# Patient Record
Sex: FEMALE | Race: WHITE | NOT HISPANIC OR LATINO | Employment: OTHER | URBAN - NONMETROPOLITAN AREA
[De-identification: names, ages, dates, MRNs, and addresses within clinical notes are randomized per-mention and may not be internally consistent; named-entity substitution may affect disease eponyms.]

---

## 2017-01-01 ENCOUNTER — HOSPITAL ENCOUNTER (OUTPATIENT)
Facility: HOSPITAL | Age: 77
Setting detail: HOSPITAL OUTPATIENT SURGERY
Discharge: HOME | End: 2017-08-24
Attending: INTERNAL MEDICINE | Admitting: INTERNAL MEDICINE

## 2017-01-01 ENCOUNTER — HOSPITAL ENCOUNTER (OUTPATIENT)
Dept: CT IMAGING | Facility: HOSPITAL | Age: 77
Discharge: HOME | End: 2017-09-12
Attending: FAMILY MEDICINE

## 2017-01-01 ENCOUNTER — TELEPHONE (OUTPATIENT)
Dept: FAMILY MEDICINE CLINIC | Facility: CLINIC | Age: 77
End: 2017-01-01

## 2017-01-01 ENCOUNTER — HOSPITAL ENCOUNTER (OUTPATIENT)
Facility: HOSPITAL | Age: 77
LOS: 1 days | Discharge: HOME | End: 2017-09-23
Attending: SPECIALIST | Admitting: SPECIALIST

## 2017-01-01 ENCOUNTER — OFFICE VISIT (OUTPATIENT)
Dept: NEUROLOGY | Facility: CLINIC | Age: 77
End: 2017-01-01

## 2017-01-01 ENCOUNTER — OFFICE VISIT (OUTPATIENT)
Dept: FAMILY MEDICINE CLINIC | Facility: CLINIC | Age: 77
End: 2017-01-01

## 2017-01-01 ENCOUNTER — PATIENT MESSAGE (OUTPATIENT)
Dept: FAMILY MEDICINE CLINIC | Facility: CLINIC | Age: 77
End: 2017-01-01

## 2017-01-01 ENCOUNTER — HOSPITAL ENCOUNTER (OUTPATIENT)
Dept: ULTRASOUND IMAGING | Facility: HOSPITAL | Age: 77
Discharge: HOME | End: 2017-07-12
Attending: FAMILY MEDICINE | Admitting: FAMILY MEDICINE

## 2017-01-01 ENCOUNTER — RESULTS ENCOUNTER (OUTPATIENT)
Dept: FAMILY MEDICINE CLINIC | Facility: CLINIC | Age: 77
End: 2017-01-01

## 2017-01-01 ENCOUNTER — OFFICE VISIT (OUTPATIENT)
Dept: GASTROENTEROLOGY | Facility: CLINIC | Age: 77
End: 2017-01-01

## 2017-01-01 ENCOUNTER — ANESTHESIA (OUTPATIENT)
Dept: PERIOP | Facility: HOSPITAL | Age: 77
End: 2017-01-01

## 2017-01-01 ENCOUNTER — HOSPITAL ENCOUNTER (OUTPATIENT)
Dept: ULTRASOUND IMAGING | Facility: HOSPITAL | Age: 77
Discharge: HOME | End: 2017-09-12
Attending: FAMILY MEDICINE | Admitting: FAMILY MEDICINE

## 2017-01-01 ENCOUNTER — APPOINTMENT (OUTPATIENT)
Dept: PREADMISSION TESTING | Facility: HOSPITAL | Age: 77
End: 2017-01-01

## 2017-01-01 ENCOUNTER — ANESTHESIA (OUTPATIENT)
Dept: GASTROENTEROLOGY | Facility: HOSPITAL | Age: 77
End: 2017-01-01

## 2017-01-01 ENCOUNTER — APPOINTMENT (OUTPATIENT)
Dept: NUCLEAR MEDICINE | Facility: HOSPITAL | Age: 77
End: 2017-01-01
Attending: FAMILY MEDICINE

## 2017-01-01 ENCOUNTER — HOSPITAL ENCOUNTER (OUTPATIENT)
Dept: NUCLEAR MEDICINE | Facility: HOSPITAL | Age: 77
Discharge: HOME | End: 2017-07-12
Attending: FAMILY MEDICINE

## 2017-01-01 ENCOUNTER — APPOINTMENT (OUTPATIENT)
Dept: ULTRASOUND IMAGING | Facility: HOSPITAL | Age: 77
End: 2017-01-01
Attending: FAMILY MEDICINE

## 2017-01-01 ENCOUNTER — ANESTHESIA EVENT (OUTPATIENT)
Dept: PERIOP | Facility: HOSPITAL | Age: 77
End: 2017-01-01

## 2017-01-01 ENCOUNTER — ANESTHESIA EVENT (OUTPATIENT)
Dept: GASTROENTEROLOGY | Facility: HOSPITAL | Age: 77
End: 2017-01-01

## 2017-01-01 VITALS
DIASTOLIC BLOOD PRESSURE: 59 MMHG | HEART RATE: 56 BPM | RESPIRATION RATE: 16 BRPM | SYSTOLIC BLOOD PRESSURE: 147 MMHG | HEIGHT: 61 IN | OXYGEN SATURATION: 98 % | BODY MASS INDEX: 29.64 KG/M2 | WEIGHT: 157 LBS

## 2017-01-01 VITALS
BODY MASS INDEX: 30.82 KG/M2 | HEIGHT: 60 IN | OXYGEN SATURATION: 99 % | DIASTOLIC BLOOD PRESSURE: 70 MMHG | HEART RATE: 54 BPM | SYSTOLIC BLOOD PRESSURE: 130 MMHG | WEIGHT: 157 LBS

## 2017-01-01 VITALS
SYSTOLIC BLOOD PRESSURE: 124 MMHG | RESPIRATION RATE: 18 BRPM | BODY MASS INDEX: 31.8 KG/M2 | DIASTOLIC BLOOD PRESSURE: 72 MMHG | HEART RATE: 60 BPM | OXYGEN SATURATION: 97 % | HEIGHT: 60 IN | TEMPERATURE: 98.3 F | WEIGHT: 162 LBS

## 2017-01-01 VITALS
WEIGHT: 156 LBS | HEART RATE: 56 BPM | OXYGEN SATURATION: 98 % | TEMPERATURE: 98.3 F | DIASTOLIC BLOOD PRESSURE: 68 MMHG | HEIGHT: 60 IN | RESPIRATION RATE: 18 BRPM | SYSTOLIC BLOOD PRESSURE: 142 MMHG | BODY MASS INDEX: 30.63 KG/M2

## 2017-01-01 VITALS
RESPIRATION RATE: 13 BRPM | WEIGHT: 157 LBS | HEIGHT: 60 IN | OXYGEN SATURATION: 97 % | DIASTOLIC BLOOD PRESSURE: 53 MMHG | HEART RATE: 48 BPM | BODY MASS INDEX: 30.82 KG/M2 | TEMPERATURE: 98 F | SYSTOLIC BLOOD PRESSURE: 159 MMHG

## 2017-01-01 VITALS
OXYGEN SATURATION: 95 % | WEIGHT: 157 LBS | DIASTOLIC BLOOD PRESSURE: 61 MMHG | RESPIRATION RATE: 18 BRPM | BODY MASS INDEX: 29.64 KG/M2 | HEIGHT: 61 IN | HEART RATE: 62 BPM | SYSTOLIC BLOOD PRESSURE: 173 MMHG | TEMPERATURE: 98.6 F

## 2017-01-01 VITALS
HEIGHT: 60 IN | HEART RATE: 57 BPM | SYSTOLIC BLOOD PRESSURE: 128 MMHG | BODY MASS INDEX: 30.82 KG/M2 | DIASTOLIC BLOOD PRESSURE: 84 MMHG | OXYGEN SATURATION: 98 % | WEIGHT: 157 LBS

## 2017-01-01 DIAGNOSIS — E66.9 OBESITY, UNSPECIFIED OBESITY SEVERITY, UNSPECIFIED OBESITY TYPE: ICD-10-CM

## 2017-01-01 DIAGNOSIS — R10.13 EPIGASTRIC PAIN: Primary | ICD-10-CM

## 2017-01-01 DIAGNOSIS — I69.90 LATE EFFECTS OF CVA (CEREBROVASCULAR ACCIDENT): Primary | Chronic | ICD-10-CM

## 2017-01-01 DIAGNOSIS — I10 ESSENTIAL HYPERTENSION: Chronic | ICD-10-CM

## 2017-01-01 DIAGNOSIS — R30.0 DYSURIA: Primary | ICD-10-CM

## 2017-01-01 DIAGNOSIS — G40.919 INTRACTABLE EPILEPSY WITHOUT STATUS EPILEPTICUS, UNSPECIFIED EPILEPSY TYPE (HCC): ICD-10-CM

## 2017-01-01 DIAGNOSIS — R10.9 ABDOMINAL PAIN, UNSPECIFIED LOCATION: ICD-10-CM

## 2017-01-01 DIAGNOSIS — I49.8 OTHER CARDIAC ARRHYTHMIA: Chronic | ICD-10-CM

## 2017-01-01 DIAGNOSIS — I69.30 CHRONIC ISCHEMIC RIGHT MCA STROKE: ICD-10-CM

## 2017-01-01 DIAGNOSIS — R10.13 EPIGASTRIC PAIN: ICD-10-CM

## 2017-01-01 DIAGNOSIS — R30.0 BURNING WITH URINATION: Primary | ICD-10-CM

## 2017-01-01 DIAGNOSIS — K21.9 GASTROESOPHAGEAL REFLUX DISEASE WITHOUT ESOPHAGITIS: ICD-10-CM

## 2017-01-01 DIAGNOSIS — I48.0 PAROXYSMAL ATRIAL FIBRILLATION (HCC): ICD-10-CM

## 2017-01-01 DIAGNOSIS — I50.9 CONGESTIVE HEART FAILURE, UNSPECIFIED CONGESTIVE HEART FAILURE CHRONICITY, UNSPECIFIED CONGESTIVE HEART FAILURE TYPE: ICD-10-CM

## 2017-01-01 DIAGNOSIS — R10.9 ABDOMINAL PAIN, UNSPECIFIED LOCATION: Primary | ICD-10-CM

## 2017-01-01 DIAGNOSIS — R30.0 BURNING WITH URINATION: ICD-10-CM

## 2017-01-01 DIAGNOSIS — R26.9 GAIT ABNORMALITY: Chronic | ICD-10-CM

## 2017-01-01 DIAGNOSIS — G25.0 TREMOR, ESSENTIAL: Chronic | ICD-10-CM

## 2017-01-01 DIAGNOSIS — N39.0 URINARY TRACT INFECTION WITHOUT HEMATURIA, SITE UNSPECIFIED: Primary | ICD-10-CM

## 2017-01-01 DIAGNOSIS — Z79.01 CHRONIC ANTICOAGULATION: Primary | Chronic | ICD-10-CM

## 2017-01-01 DIAGNOSIS — I69.90 LATE EFFECTS OF CVA (CEREBROVASCULAR ACCIDENT): Chronic | ICD-10-CM

## 2017-01-01 DIAGNOSIS — R93.89 ABNORMAL X-RAY: ICD-10-CM

## 2017-01-01 DIAGNOSIS — K80.50 BILIARY COLIC: ICD-10-CM

## 2017-01-01 DIAGNOSIS — Z74.09 IMPAIRED MOBILITY: Primary | ICD-10-CM

## 2017-01-01 DIAGNOSIS — Z79.01 CHRONIC ANTICOAGULATION: Chronic | ICD-10-CM

## 2017-01-01 DIAGNOSIS — R26.9 GAIT ABNORMALITY: Primary | Chronic | ICD-10-CM

## 2017-01-01 DIAGNOSIS — R30.0 DYSURIA: ICD-10-CM

## 2017-01-01 DIAGNOSIS — E55.9 UNSPECIFIED VITAMIN D DEFICIENCY: ICD-10-CM

## 2017-01-01 DIAGNOSIS — I10 HTN (HYPERTENSION), BENIGN: ICD-10-CM

## 2017-01-01 DIAGNOSIS — G40.919 INTRACTABLE EPILEPSY WITHOUT STATUS EPILEPTICUS, UNSPECIFIED EPILEPSY TYPE (HCC): Primary | ICD-10-CM

## 2017-01-01 DIAGNOSIS — R73.01 ELEVATED FASTING GLUCOSE: ICD-10-CM

## 2017-01-01 DIAGNOSIS — E78.5 HYPERLIPIDEMIA, UNSPECIFIED HYPERLIPIDEMIA TYPE: Chronic | ICD-10-CM

## 2017-01-01 LAB
25(OH)D3+25(OH)D2 SERPL-MCNC: 37.8 NG/ML (ref 30–100)
ALBUMIN SERPL-MCNC: 3 G/DL (ref 3.5–5)
ALBUMIN SERPL-MCNC: 4 G/DL (ref 3.5–5)
ALBUMIN/GLOB SERPL: 1.2 G/DL (ref 1.1–2.5)
ALBUMIN/GLOB SERPL: 1.5 G/DL (ref 1.1–2.5)
ALP SERPL-CCNC: 45 U/L (ref 24–120)
ALP SERPL-CCNC: 57 U/L (ref 24–120)
ALT SERPL W P-5'-P-CCNC: 39 U/L (ref 0–54)
ALT SERPL-CCNC: 33 U/L (ref 0–54)
ANION GAP SERPL CALCULATED.3IONS-SCNC: 8 MMOL/L (ref 4–13)
ANION GAP SERPL CALCULATED.3IONS-SCNC: 9 MMOL/L (ref 4–13)
APPEARANCE UR: CLEAR
APPEARANCE UR: CLEAR
AST SERPL-CCNC: 28 U/L (ref 7–45)
AST SERPL-CCNC: 34 U/L (ref 7–45)
BACTERIA #/AREA URNS HPF: ABNORMAL /HPF
BACTERIA #/AREA URNS HPF: NORMAL /HPF
BACTERIA SPEC AEROBE CULT: ABNORMAL
BACTERIA UR CULT: ABNORMAL
BACTERIA UR QL AUTO: ABNORMAL /HPF
BASOPHILS # BLD AUTO: 0.03 10*3/MM3 (ref 0–0.2)
BASOPHILS NFR BLD AUTO: 0.4 % (ref 0–2)
BILIRUB SERPL-MCNC: 0.3 MG/DL (ref 0.1–1)
BILIRUB SERPL-MCNC: 0.4 MG/DL (ref 0.1–1)
BILIRUB UR QL STRIP: NEGATIVE
BUN BLD-MCNC: 18 MG/DL (ref 5–21)
BUN BLD-MCNC: 21 MG/DL (ref 5–21)
BUN SERPL-MCNC: 21 MG/DL (ref 5–21)
BUN/CREAT SERPL: 23.7 (ref 7–25)
BUN/CREAT SERPL: 25.6 (ref 7–25)
BUN/CREAT SERPL: 29.6 (ref 7–25)
CALCIUM SERPL-MCNC: 9.5 MG/DL (ref 8.4–10.4)
CALCIUM SPEC-SCNC: 8.5 MG/DL (ref 8.4–10.4)
CALCIUM SPEC-SCNC: 8.7 MG/DL (ref 8.4–10.4)
CHLORIDE SERPL-SCNC: 104 MMOL/L (ref 98–110)
CHLORIDE SERPL-SCNC: 105 MMOL/L (ref 98–110)
CHLORIDE SERPL-SCNC: 105 MMOL/L (ref 98–110)
CHOLEST SERPL-MCNC: 147 MG/DL (ref 130–200)
CLARITY UR: CLEAR
CO2 SERPL-SCNC: 24 MMOL/L (ref 24–31)
CO2 SERPL-SCNC: 26 MMOL/L (ref 24–31)
CO2 SERPL-SCNC: 27 MMOL/L (ref 24–31)
COLOR UR: YELLOW
CREAT BLD-MCNC: 0.76 MG/DL (ref 0.5–1.4)
CREAT BLD-MCNC: 0.82 MG/DL (ref 0.5–1.4)
CREAT SERPL-MCNC: 0.71 MG/DL (ref 0.5–1.4)
CYTO UR: NORMAL
DEPRECATED RDW RBC AUTO: 43.3 FL (ref 40–54)
DEPRECATED RDW RBC AUTO: 43.4 FL (ref 40–54)
EOSINOPHIL # BLD AUTO: 0.15 10*3/MM3 (ref 0–0.7)
EOSINOPHIL NFR BLD AUTO: 1.9 % (ref 0–4)
EPI CELLS #/AREA URNS HPF: ABNORMAL /HPF
EPI CELLS #/AREA URNS HPF: NORMAL /HPF
ERYTHROCYTE [DISTWIDTH] IN BLOOD BY AUTOMATED COUNT: 13.6 % (ref 12–15)
ERYTHROCYTE [DISTWIDTH] IN BLOOD BY AUTOMATED COUNT: 13.6 % (ref 12–15)
ERYTHROCYTE [DISTWIDTH] IN BLOOD BY AUTOMATED COUNT: 13.8 % (ref 12–15)
GFR SERPL CREATININE-BSD FRML MDRD: 68 ML/MIN/1.73
GFR SERPL CREATININE-BSD FRML MDRD: 74 ML/MIN/1.73
GLOBULIN SER CALC-MCNC: 2.7 GM/DL
GLOBULIN UR ELPH-MCNC: 2.6 GM/DL
GLUCOSE BLD-MCNC: 101 MG/DL (ref 70–100)
GLUCOSE BLD-MCNC: 96 MG/DL (ref 70–100)
GLUCOSE SERPL-MCNC: 115 MG/DL (ref 70–100)
GLUCOSE UR QL: NEGATIVE
GLUCOSE UR QL: NEGATIVE
GLUCOSE UR STRIP-MCNC: NEGATIVE MG/DL
HBA1C MFR BLD: 5.5 %
HCT VFR BLD AUTO: 39.8 % (ref 37–47)
HCT VFR BLD AUTO: 42.4 % (ref 37–47)
HCT VFR BLD AUTO: 45.4 % (ref 37–47)
HDLC SERPL-MCNC: 42 MG/DL
HGB BLD-MCNC: 13.2 G/DL (ref 12–16)
HGB BLD-MCNC: 13.9 G/DL (ref 12–16)
HGB BLD-MCNC: 14.9 G/DL (ref 12–16)
HGB UR QL STRIP.AUTO: NEGATIVE
HGB UR QL STRIP: NEGATIVE
HGB UR QL STRIP: NEGATIVE
HYALINE CASTS UR QL AUTO: ABNORMAL /LPF
IMM GRANULOCYTES # BLD: 0.02 10*3/MM3 (ref 0–0.03)
IMM GRANULOCYTES NFR BLD: 0.2 % (ref 0–5)
KETONES UR QL STRIP: NEGATIVE
LAB AP CASE REPORT: NORMAL
LDLC SERPL CALC-MCNC: 52 MG/DL (ref 0–99)
LEUKOCYTE ESTERASE UR QL STRIP.AUTO: ABNORMAL
LEUKOCYTE ESTERASE UR QL STRIP: ABNORMAL
LEUKOCYTE ESTERASE UR QL STRIP: ABNORMAL
LYMPHOCYTES # BLD AUTO: 1.75 10*3/MM3 (ref 0.72–4.86)
LYMPHOCYTES NFR BLD AUTO: 21.6 % (ref 15–45)
Lab: NORMAL
MAGNESIUM SERPL-MCNC: 1.9 MG/DL (ref 1.4–2.2)
MCH RBC QN AUTO: 28.7 PG (ref 28–32)
MCH RBC QN AUTO: 28.8 PG (ref 28–32)
MCH RBC QN AUTO: 28.9 PG (ref 28–32)
MCHC RBC AUTO-ENTMCNC: 32.8 G/DL (ref 33–36)
MCHC RBC AUTO-ENTMCNC: 32.8 G/DL (ref 33–36)
MCHC RBC AUTO-ENTMCNC: 33.2 G/DL (ref 33–36)
MCV RBC AUTO: 87.1 FL (ref 82–98)
MCV RBC AUTO: 87.4 FL (ref 82–98)
MCV RBC AUTO: 87.6 FL (ref 82–98)
MICRO URNS: ABNORMAL
MICRO URNS: ABNORMAL
MONOCYTES # BLD AUTO: 1.01 10*3/MM3 (ref 0.19–1.3)
MONOCYTES NFR BLD AUTO: 12.5 % (ref 4–12)
MUCOUS THREADS URNS QL MICRO: PRESENT /HPF
MUCOUS THREADS URNS QL MICRO: PRESENT /HPF
NEUTROPHILS # BLD AUTO: 5.13 10*3/MM3 (ref 1.87–8.4)
NEUTROPHILS NFR BLD AUTO: 63.4 % (ref 39–78)
NITRITE UR QL STRIP: NEGATIVE
OTHER ANTIBIOTIC SUSC ISLT: ABNORMAL
OTHER ANTIBIOTIC SUSC ISLT: ABNORMAL
PATH REPORT.FINAL DX SPEC: NORMAL
PATH REPORT.GROSS SPEC: NORMAL
PH UR STRIP.AUTO: 6.5 [PH] (ref 5–8)
PH UR STRIP: 5.5 [PH] (ref 5–7.5)
PH UR STRIP: 6 [PH] (ref 5–7.5)
PLATELET # BLD AUTO: 189 10*3/MM3 (ref 130–400)
PLATELET # BLD AUTO: 195 10*3/MM3 (ref 130–400)
PLATELET # BLD AUTO: 197 10*3/MM3 (ref 130–400)
PMV BLD AUTO: 11.3 FL (ref 6–12)
PMV BLD AUTO: 11.8 FL (ref 6–12)
POTASSIUM BLD-SCNC: 3.5 MMOL/L (ref 3.5–5.3)
POTASSIUM BLD-SCNC: 3.5 MMOL/L (ref 3.5–5.3)
POTASSIUM SERPL-SCNC: 4.6 MMOL/L (ref 3.5–5.3)
PROT SERPL-MCNC: 5.6 G/DL (ref 6.3–8.7)
PROT SERPL-MCNC: 6.7 G/DL (ref 6.3–8.7)
PROT UR QL STRIP: NEGATIVE
RBC # BLD AUTO: 4.57 10*6/MM3 (ref 4.2–5.4)
RBC # BLD AUTO: 4.85 10*6/MM3 (ref 4.2–5.4)
RBC # BLD AUTO: 5.18 10*6/MM3 (ref 4.2–5.4)
RBC # UR: ABNORMAL /HPF
RBC #/AREA URNS HPF: ABNORMAL /HPF
RBC #/AREA URNS HPF: NORMAL /HPF
REF LAB TEST METHOD: ABNORMAL
SODIUM BLD-SCNC: 139 MMOL/L (ref 135–145)
SODIUM BLD-SCNC: 141 MMOL/L (ref 135–145)
SODIUM SERPL-SCNC: 139 MMOL/L (ref 135–145)
SP GR UR STRIP: 1.01 (ref 1–1.03)
SP GR UR: 1.01 (ref 1–1.03)
SP GR UR: 1.01 (ref 1–1.03)
SQUAMOUS #/AREA URNS HPF: ABNORMAL /HPF
T4 SERPL-MCNC: 7.3 UG/DL (ref 4.5–12)
TRIGL SERPL-MCNC: 265 MG/DL (ref 0–149)
TSH SERPL DL<=0.005 MIU/L-ACNC: 1.58 MIU/ML (ref 0.47–4.68)
URINALYSIS REFLEX: ABNORMAL
URINALYSIS REFLEX: ABNORMAL
UROBILINOGEN UR QL STRIP: ABNORMAL
UROBILINOGEN UR STRIP-MCNC: 0.2 MG/DL (ref 0.2–1)
UROBILINOGEN UR STRIP-MCNC: 0.2 MG/DL (ref 0.2–1)
VLDLC SERPL CALC-MCNC: 53 MG/DL
WBC # BLD AUTO: 8.09 10*3/MM3 (ref 4.8–10.8)
WBC #/AREA URNS HPF: ABNORMAL /HPF
WBC #/AREA URNS HPF: NORMAL /HPF
WBC NRBC COR # BLD: 10.42 10*3/MM3 (ref 4.8–10.8)
WBC NRBC COR # BLD: 11.07 10*3/MM3 (ref 4.8–10.8)
WBC UR QL AUTO: ABNORMAL /HPF
YEAST URNS QL MICRO: ABNORMAL /HPF

## 2017-01-01 PROCEDURE — 25010000002 DEXAMETHASONE PER 1 MG: Performed by: ANESTHESIOLOGY

## 2017-01-01 PROCEDURE — 93005 ELECTROCARDIOGRAM TRACING: CPT

## 2017-01-01 PROCEDURE — 43235 EGD DIAGNOSTIC BRUSH WASH: CPT | Performed by: INTERNAL MEDICINE

## 2017-01-01 PROCEDURE — A9270 NON-COVERED ITEM OR SERVICE: HCPCS | Performed by: SPECIALIST

## 2017-01-01 PROCEDURE — G8979 MOBILITY GOAL STATUS: HCPCS

## 2017-01-01 PROCEDURE — 85027 COMPLETE CBC AUTOMATED: CPT | Performed by: SPECIALIST

## 2017-01-01 PROCEDURE — 63710000001 LEVETIRACETAM 500 MG TABLET: Performed by: SPECIALIST

## 2017-01-01 PROCEDURE — 97116 GAIT TRAINING THERAPY: CPT

## 2017-01-01 PROCEDURE — 76705 ECHO EXAM OF ABDOMEN: CPT

## 2017-01-01 PROCEDURE — 63710000001 HYDROCODONE-ACETAMINOPHEN 5-325 MG TABLET: Performed by: SPECIALIST

## 2017-01-01 PROCEDURE — 83735 ASSAY OF MAGNESIUM: CPT | Performed by: SPECIALIST

## 2017-01-01 PROCEDURE — 25010000002 MORPHINE PER 10 MG: Performed by: NURSE ANESTHETIST, CERTIFIED REGISTERED

## 2017-01-01 PROCEDURE — 63710000001 LISINOPRIL 2.5 MG TABLET: Performed by: SPECIALIST

## 2017-01-01 PROCEDURE — 63710000001 PROPAFENONE 150 MG TABLET: Performed by: SPECIALIST

## 2017-01-01 PROCEDURE — 99213 OFFICE O/P EST LOW 20 MIN: CPT | Performed by: FAMILY MEDICINE

## 2017-01-01 PROCEDURE — 76775 US EXAM ABDO BACK WALL LIM: CPT

## 2017-01-01 PROCEDURE — G8978 MOBILITY CURRENT STATUS: HCPCS

## 2017-01-01 PROCEDURE — 63710000001 PRIMIDONE 50 MG TABLET: Performed by: SPECIALIST

## 2017-01-01 PROCEDURE — 99214 OFFICE O/P EST MOD 30 MIN: CPT | Performed by: CLINICAL NURSE SPECIALIST

## 2017-01-01 PROCEDURE — 25010000002 ONDANSETRON PER 1 MG: Performed by: NURSE ANESTHETIST, CERTIFIED REGISTERED

## 2017-01-01 PROCEDURE — 63710000001 METOPROLOL SUCCINATE XL 25 MG TABLET SUSTAINED-RELEASE 24 HOUR: Performed by: SPECIALIST

## 2017-01-01 PROCEDURE — 94799 UNLISTED PULMONARY SVC/PX: CPT

## 2017-01-01 PROCEDURE — 25010000002 SINCALIDE PER 5 MCG: Performed by: FAMILY MEDICINE

## 2017-01-01 PROCEDURE — 63710000001 ATORVASTATIN 40 MG TABLET: Performed by: SPECIALIST

## 2017-01-01 PROCEDURE — 63710000001 DIGOXIN 125 MCG TABLET: Performed by: SPECIALIST

## 2017-01-01 PROCEDURE — 93010 ELECTROCARDIOGRAM REPORT: CPT | Performed by: INTERNAL MEDICINE

## 2017-01-01 PROCEDURE — 25010000002 FENTANYL CITRATE (PF) 100 MCG/2ML SOLUTION: Performed by: NURSE ANESTHETIST, CERTIFIED REGISTERED

## 2017-01-01 PROCEDURE — 81001 URINALYSIS AUTO W/SCOPE: CPT | Performed by: SPECIALIST

## 2017-01-01 PROCEDURE — 87086 URINE CULTURE/COLONY COUNT: CPT | Performed by: SPECIALIST

## 2017-01-01 PROCEDURE — 63710000001 PANTOPRAZOLE 40 MG TABLET DELAYED-RELEASE: Performed by: SPECIALIST

## 2017-01-01 PROCEDURE — 99213 OFFICE O/P EST LOW 20 MIN: CPT | Performed by: CLINICAL NURSE SPECIALIST

## 2017-01-01 PROCEDURE — 78227 HEPATOBIL SYST IMAGE W/DRUG: CPT

## 2017-01-01 PROCEDURE — 94760 N-INVAS EAR/PLS OXIMETRY 1: CPT

## 2017-01-01 PROCEDURE — 74176 CT ABD & PELVIS W/O CONTRAST: CPT

## 2017-01-01 PROCEDURE — 25010000002 PROPOFOL 10 MG/ML EMULSION: Performed by: NURSE ANESTHETIST, CERTIFIED REGISTERED

## 2017-01-01 PROCEDURE — 97162 PT EVAL MOD COMPLEX 30 MIN: CPT

## 2017-01-01 PROCEDURE — A9537 TC99M MEBROFENIN: HCPCS | Performed by: FAMILY MEDICINE

## 2017-01-01 PROCEDURE — 80053 COMPREHEN METABOLIC PANEL: CPT | Performed by: SPECIALIST

## 2017-01-01 PROCEDURE — 0 TECHNETIUM TC 99M MEBROFENIN KIT: Performed by: FAMILY MEDICINE

## 2017-01-01 PROCEDURE — 25010000002 HYDRALAZINE PER 20 MG: Performed by: ANESTHESIOLOGY

## 2017-01-01 PROCEDURE — 88304 TISSUE EXAM BY PATHOLOGIST: CPT | Performed by: SPECIALIST

## 2017-01-01 PROCEDURE — 80048 BASIC METABOLIC PNL TOTAL CA: CPT | Performed by: SPECIALIST

## 2017-01-01 PROCEDURE — 25010000002 SUCCINYLCHOLINE PER 20 MG: Performed by: NURSE ANESTHETIST, CERTIFIED REGISTERED

## 2017-01-01 PROCEDURE — 25010000002 NEOSTIGMINE PER 0.5 MG: Performed by: NURSE ANESTHETIST, CERTIFIED REGISTERED

## 2017-01-01 RX ORDER — PANTOPRAZOLE SODIUM 40 MG/1
40 TABLET, DELAYED RELEASE ORAL EVERY MORNING
Status: DISCONTINUED | OUTPATIENT
Start: 2017-01-01 | End: 2017-01-01 | Stop reason: HOSPADM

## 2017-01-01 RX ORDER — HYDROCODONE BITARTRATE AND ACETAMINOPHEN 5; 325 MG/1; MG/1
1 TABLET ORAL EVERY 4 HOURS PRN
Status: DISCONTINUED | OUTPATIENT
Start: 2017-01-01 | End: 2017-01-01 | Stop reason: HOSPADM

## 2017-01-01 RX ORDER — LISINOPRIL 2.5 MG/1
2.5 TABLET ORAL
Status: DISCONTINUED | OUTPATIENT
Start: 2017-01-01 | End: 2017-01-01 | Stop reason: HOSPADM

## 2017-01-01 RX ORDER — METOCLOPRAMIDE HYDROCHLORIDE 5 MG/ML
5 INJECTION INTRAMUSCULAR; INTRAVENOUS
Status: DISCONTINUED | OUTPATIENT
Start: 2017-01-01 | End: 2017-01-01 | Stop reason: HOSPADM

## 2017-01-01 RX ORDER — MORPHINE SULFATE 10 MG/ML
INJECTION INTRAMUSCULAR; INTRAVENOUS; SUBCUTANEOUS AS NEEDED
Status: DISCONTINUED | OUTPATIENT
Start: 2017-01-01 | End: 2017-01-01 | Stop reason: SURG

## 2017-01-01 RX ORDER — LIDOCAINE HYDROCHLORIDE 10 MG/ML
0.5 INJECTION, SOLUTION INFILTRATION; PERINEURAL ONCE AS NEEDED
Status: DISCONTINUED | OUTPATIENT
Start: 2017-01-01 | End: 2017-01-01

## 2017-01-01 RX ORDER — LEVETIRACETAM 1000 MG/1
TABLET ORAL
Qty: 60 TABLET | Refills: 5 | Status: SHIPPED | OUTPATIENT
Start: 2017-01-01

## 2017-01-01 RX ORDER — ATORVASTATIN CALCIUM 40 MG/1
40 TABLET, FILM COATED ORAL NIGHTLY
Status: DISCONTINUED | OUTPATIENT
Start: 2017-01-01 | End: 2017-01-01 | Stop reason: HOSPADM

## 2017-01-01 RX ORDER — PRIMIDONE 50 MG/1
TABLET ORAL
Qty: 180 TABLET | Refills: 2 | OUTPATIENT
Start: 2017-01-01 | End: 2018-01-01 | Stop reason: SDUPTHER

## 2017-01-01 RX ORDER — CIPROFLOXACIN 500 MG/1
500 TABLET, FILM COATED ORAL 2 TIMES DAILY
Qty: 20 TABLET | Refills: 0 | Status: SHIPPED | OUTPATIENT
Start: 2017-01-01 | End: 2018-01-01

## 2017-01-01 RX ORDER — SODIUM CHLORIDE, SODIUM LACTATE, POTASSIUM CHLORIDE, CALCIUM CHLORIDE 600; 310; 30; 20 MG/100ML; MG/100ML; MG/100ML; MG/100ML
100 INJECTION, SOLUTION INTRAVENOUS CONTINUOUS
Status: DISCONTINUED | OUTPATIENT
Start: 2017-01-01 | End: 2017-01-01

## 2017-01-01 RX ORDER — LISINOPRIL 2.5 MG/1
TABLET ORAL
Qty: 90 TABLET | Refills: 1 | Status: SHIPPED | OUTPATIENT
Start: 2017-01-01 | End: 2018-01-01 | Stop reason: SDUPTHER

## 2017-01-01 RX ORDER — SODIUM CHLORIDE, SODIUM LACTATE, POTASSIUM CHLORIDE, CALCIUM CHLORIDE 600; 310; 30; 20 MG/100ML; MG/100ML; MG/100ML; MG/100ML
100 INJECTION, SOLUTION INTRAVENOUS CONTINUOUS
Status: DISCONTINUED | OUTPATIENT
Start: 2017-01-01 | End: 2017-01-01 | Stop reason: HOSPADM

## 2017-01-01 RX ORDER — SODIUM CHLORIDE 0.9 % (FLUSH) 0.9 %
1-10 SYRINGE (ML) INJECTION AS NEEDED
Status: DISCONTINUED | OUTPATIENT
Start: 2017-01-01 | End: 2017-01-01 | Stop reason: HOSPADM

## 2017-01-01 RX ORDER — KIT FOR THE PREPARATION OF TECHNETIUM TC 99M MEBROFENIN 45 MG/10ML
1 INJECTION, POWDER, LYOPHILIZED, FOR SOLUTION INTRAVENOUS
Status: COMPLETED | OUTPATIENT
Start: 2017-01-01 | End: 2017-01-01

## 2017-01-01 RX ORDER — SUCCINYLCHOLINE CHLORIDE 20 MG/ML
INJECTION INTRAMUSCULAR; INTRAVENOUS AS NEEDED
Status: DISCONTINUED | OUTPATIENT
Start: 2017-01-01 | End: 2017-01-01 | Stop reason: SURG

## 2017-01-01 RX ORDER — FLUTICASONE PROPIONATE 50 MCG
1 SPRAY, SUSPENSION (ML) NASAL DAILY
Status: DISCONTINUED | OUTPATIENT
Start: 2017-01-01 | End: 2017-01-01 | Stop reason: HOSPADM

## 2017-01-01 RX ORDER — DIGOXIN 125 MCG
TABLET ORAL
Qty: 15 TABLET | Refills: 5 | Status: ON HOLD | OUTPATIENT
Start: 2017-01-01 | End: 2017-01-01 | Stop reason: SDUPTHER

## 2017-01-01 RX ORDER — LABETALOL HYDROCHLORIDE 5 MG/ML
5 INJECTION, SOLUTION INTRAVENOUS
Status: DISCONTINUED | OUTPATIENT
Start: 2017-01-01 | End: 2017-01-01 | Stop reason: HOSPADM

## 2017-01-01 RX ORDER — LIDOCAINE HYDROCHLORIDE 20 MG/ML
INJECTION, SOLUTION INFILTRATION; PERINEURAL AS NEEDED
Status: DISCONTINUED | OUTPATIENT
Start: 2017-01-01 | End: 2017-01-01 | Stop reason: SURG

## 2017-01-01 RX ORDER — ONDANSETRON 2 MG/ML
4 INJECTION INTRAMUSCULAR; INTRAVENOUS AS NEEDED
Status: DISCONTINUED | OUTPATIENT
Start: 2017-01-01 | End: 2017-01-01 | Stop reason: HOSPADM

## 2017-01-01 RX ORDER — PRIMIDONE 50 MG/1
100 TABLET ORAL EVERY 8 HOURS SCHEDULED
Status: DISCONTINUED | OUTPATIENT
Start: 2017-01-01 | End: 2017-01-01 | Stop reason: HOSPADM

## 2017-01-01 RX ORDER — SODIUM CHLORIDE 0.9 % (FLUSH) 0.9 %
1-10 SYRINGE (ML) INJECTION AS NEEDED
Status: CANCELLED | OUTPATIENT
Start: 2017-01-01

## 2017-01-01 RX ORDER — ONDANSETRON 2 MG/ML
INJECTION INTRAMUSCULAR; INTRAVENOUS AS NEEDED
Status: DISCONTINUED | OUTPATIENT
Start: 2017-01-01 | End: 2017-01-01 | Stop reason: SURG

## 2017-01-01 RX ORDER — ROCURONIUM BROMIDE 10 MG/ML
INJECTION, SOLUTION INTRAVENOUS AS NEEDED
Status: DISCONTINUED | OUTPATIENT
Start: 2017-01-01 | End: 2017-01-01 | Stop reason: SURG

## 2017-01-01 RX ORDER — AMOXICILLIN AND CLAVULANATE POTASSIUM 875; 125 MG/1; MG/1
1 TABLET, FILM COATED ORAL 2 TIMES DAILY
Qty: 30 TABLET | Refills: 0 | Status: SHIPPED | OUTPATIENT
Start: 2017-01-01 | End: 2018-01-01

## 2017-01-01 RX ORDER — FENTANYL CITRATE 50 UG/ML
INJECTION, SOLUTION INTRAMUSCULAR; INTRAVENOUS AS NEEDED
Status: DISCONTINUED | OUTPATIENT
Start: 2017-01-01 | End: 2017-01-01 | Stop reason: SURG

## 2017-01-01 RX ORDER — DEXAMETHASONE SODIUM PHOSPHATE 4 MG/ML
4 INJECTION, SOLUTION INTRA-ARTICULAR; INTRALESIONAL; INTRAMUSCULAR; INTRAVENOUS; SOFT TISSUE ONCE AS NEEDED
Status: COMPLETED | OUTPATIENT
Start: 2017-01-01 | End: 2017-01-01

## 2017-01-01 RX ORDER — IPRATROPIUM BROMIDE AND ALBUTEROL SULFATE 2.5; .5 MG/3ML; MG/3ML
3 SOLUTION RESPIRATORY (INHALATION) ONCE AS NEEDED
Status: DISCONTINUED | OUTPATIENT
Start: 2017-01-01 | End: 2017-01-01 | Stop reason: HOSPADM

## 2017-01-01 RX ORDER — METOPROLOL SUCCINATE 25 MG/1
TABLET, EXTENDED RELEASE ORAL
Qty: 90 TABLET | Refills: 1 | Status: SHIPPED | OUTPATIENT
Start: 2017-01-01 | End: 2018-01-01 | Stop reason: SDUPTHER

## 2017-01-01 RX ORDER — ONDANSETRON 2 MG/ML
4 INJECTION INTRAMUSCULAR; INTRAVENOUS EVERY 4 HOURS PRN
Status: DISCONTINUED | OUTPATIENT
Start: 2017-01-01 | End: 2017-01-01 | Stop reason: HOSPADM

## 2017-01-01 RX ORDER — PROPAFENONE HYDROCHLORIDE 150 MG/1
150 TABLET, COATED ORAL EVERY 8 HOURS SCHEDULED
Status: DISCONTINUED | OUTPATIENT
Start: 2017-01-01 | End: 2017-01-01 | Stop reason: HOSPADM

## 2017-01-01 RX ORDER — METOPROLOL SUCCINATE 25 MG/1
25 TABLET, EXTENDED RELEASE ORAL
Status: DISCONTINUED | OUTPATIENT
Start: 2017-01-01 | End: 2017-01-01 | Stop reason: HOSPADM

## 2017-01-01 RX ORDER — SODIUM CHLORIDE 9 MG/ML
100 INJECTION, SOLUTION INTRAVENOUS CONTINUOUS
Status: CANCELLED | OUTPATIENT
Start: 2017-01-01

## 2017-01-01 RX ORDER — MEPERIDINE HYDROCHLORIDE 25 MG/ML
12.5 INJECTION INTRAMUSCULAR; INTRAVENOUS; SUBCUTANEOUS
Status: DISCONTINUED | OUTPATIENT
Start: 2017-01-01 | End: 2017-01-01 | Stop reason: HOSPADM

## 2017-01-01 RX ORDER — GLYCOPYRROLATE 0.2 MG/ML
INJECTION INTRAMUSCULAR; INTRAVENOUS AS NEEDED
Status: DISCONTINUED | OUTPATIENT
Start: 2017-01-01 | End: 2017-01-01 | Stop reason: SURG

## 2017-01-01 RX ORDER — OMEPRAZOLE 20 MG/1
20 CAPSULE, DELAYED RELEASE ORAL 2 TIMES DAILY
Qty: 60 CAPSULE | Refills: 11 | Status: SHIPPED | OUTPATIENT
Start: 2017-01-01

## 2017-01-01 RX ORDER — LEVETIRACETAM 500 MG/1
1000 TABLET ORAL 2 TIMES DAILY
Status: DISCONTINUED | OUTPATIENT
Start: 2017-01-01 | End: 2017-01-01 | Stop reason: HOSPADM

## 2017-01-01 RX ORDER — FLUMAZENIL 0.1 MG/ML
0.2 INJECTION INTRAVENOUS AS NEEDED
Status: DISCONTINUED | OUTPATIENT
Start: 2017-01-01 | End: 2017-01-01 | Stop reason: HOSPADM

## 2017-01-01 RX ORDER — NALOXONE HCL 0.4 MG/ML
0.04 VIAL (ML) INJECTION AS NEEDED
Status: DISCONTINUED | OUTPATIENT
Start: 2017-01-01 | End: 2017-01-01 | Stop reason: HOSPADM

## 2017-01-01 RX ORDER — BUPIVACAINE HYDROCHLORIDE AND EPINEPHRINE 2.5; 5 MG/ML; UG/ML
INJECTION, SOLUTION INFILTRATION; PERINEURAL AS NEEDED
Status: DISCONTINUED | OUTPATIENT
Start: 2017-01-01 | End: 2017-01-01 | Stop reason: HOSPADM

## 2017-01-01 RX ORDER — PRIMIDONE 50 MG/1
TABLET ORAL
Qty: 180 TABLET | Refills: 2 | Status: SHIPPED | OUTPATIENT
Start: 2017-01-01 | End: 2017-01-01 | Stop reason: SDUPTHER

## 2017-01-01 RX ORDER — HYDRALAZINE HYDROCHLORIDE 20 MG/ML
5 INJECTION INTRAMUSCULAR; INTRAVENOUS
Status: DISCONTINUED | OUTPATIENT
Start: 2017-01-01 | End: 2017-01-01 | Stop reason: HOSPADM

## 2017-01-01 RX ORDER — SODIUM CHLORIDE 0.9 % (FLUSH) 0.9 %
3 SYRINGE (ML) INJECTION AS NEEDED
Status: DISCONTINUED | OUTPATIENT
Start: 2017-01-01 | End: 2017-01-01 | Stop reason: HOSPADM

## 2017-01-01 RX ORDER — ESTRADIOL 0.1 MG/G
CREAM VAGINAL
Qty: 42.5 G | Refills: 0 | OUTPATIENT
Start: 2017-01-01 | End: 2018-01-01

## 2017-01-01 RX ORDER — SODIUM CHLORIDE 9 MG/ML
INJECTION, SOLUTION INTRAVENOUS AS NEEDED
Status: DISCONTINUED | OUTPATIENT
Start: 2017-01-01 | End: 2017-01-01 | Stop reason: HOSPADM

## 2017-01-01 RX ORDER — PROPAFENONE HYDROCHLORIDE 150 MG/1
TABLET, COATED ORAL
Qty: 90 TABLET | Refills: 5 | Status: SHIPPED | OUTPATIENT
Start: 2017-01-01 | End: 2018-01-01 | Stop reason: SDUPTHER

## 2017-01-01 RX ORDER — MORPHINE SULFATE 2 MG/ML
2 INJECTION, SOLUTION INTRAMUSCULAR; INTRAVENOUS AS NEEDED
Status: DISCONTINUED | OUTPATIENT
Start: 2017-01-01 | End: 2017-01-01 | Stop reason: HOSPADM

## 2017-01-01 RX ORDER — SODIUM CHLORIDE, SODIUM LACTATE, POTASSIUM CHLORIDE, CALCIUM CHLORIDE 600; 310; 30; 20 MG/100ML; MG/100ML; MG/100ML; MG/100ML
1000 INJECTION, SOLUTION INTRAVENOUS CONTINUOUS
Status: DISCONTINUED | OUTPATIENT
Start: 2017-01-01 | End: 2017-01-01

## 2017-01-01 RX ORDER — MAGNESIUM 200 MG
1 TABLET ORAL DAILY
COMMUNITY

## 2017-01-01 RX ORDER — PROPOFOL 10 MG/ML
VIAL (ML) INTRAVENOUS AS NEEDED
Status: DISCONTINUED | OUTPATIENT
Start: 2017-01-01 | End: 2017-01-01 | Stop reason: SURG

## 2017-01-01 RX ORDER — MAGNESIUM HYDROXIDE 1200 MG/15ML
LIQUID ORAL AS NEEDED
Status: DISCONTINUED | OUTPATIENT
Start: 2017-01-01 | End: 2017-01-01 | Stop reason: HOSPADM

## 2017-01-01 RX ORDER — POTASSIUM CHLORIDE 20 MEQ/1
TABLET, EXTENDED RELEASE ORAL
Qty: 180 TABLET | Refills: 1 | Status: ON HOLD | OUTPATIENT
Start: 2017-01-01 | End: 2017-01-01 | Stop reason: SDUPTHER

## 2017-01-01 RX ORDER — SODIUM CHLORIDE 9 MG/ML
500 INJECTION, SOLUTION INTRAVENOUS CONTINUOUS PRN
Status: DISCONTINUED | OUTPATIENT
Start: 2017-01-01 | End: 2017-01-01 | Stop reason: HOSPADM

## 2017-01-01 RX ORDER — DIGOXIN 125 MCG
125 TABLET ORAL DAILY
Status: DISCONTINUED | OUTPATIENT
Start: 2017-01-01 | End: 2017-01-01 | Stop reason: HOSPADM

## 2017-01-01 RX ORDER — MORPHINE SULFATE 10 MG/ML
4 INJECTION INTRAMUSCULAR; INTRAVENOUS; SUBCUTANEOUS EVERY 4 HOURS PRN
Status: DISCONTINUED | OUTPATIENT
Start: 2017-01-01 | End: 2017-01-01 | Stop reason: HOSPADM

## 2017-01-01 RX ADMIN — SODIUM CHLORIDE, POTASSIUM CHLORIDE, SODIUM LACTATE AND CALCIUM CHLORIDE 100 ML/HR: 600; 310; 30; 20 INJECTION, SOLUTION INTRAVENOUS at 21:49

## 2017-01-01 RX ADMIN — SODIUM CHLORIDE, POTASSIUM CHLORIDE, SODIUM LACTATE AND CALCIUM CHLORIDE 100 ML/HR: 600; 310; 30; 20 INJECTION, SOLUTION INTRAVENOUS at 01:07

## 2017-01-01 RX ADMIN — MORPHINE SULFATE 2 MG: 10 INJECTION, SOLUTION INTRAMUSCULAR; INTRAVENOUS at 11:21

## 2017-01-01 RX ADMIN — LEVETIRACETAM 1000 MG: 500 TABLET, FILM COATED ORAL at 18:27

## 2017-01-01 RX ADMIN — HYDROCODONE BITARTRATE AND ACETAMINOPHEN 1 TABLET: 5; 325 TABLET ORAL at 20:48

## 2017-01-01 RX ADMIN — PROPAFENONE HYDROCHLORIDE 150 MG: 150 TABLET, COATED ORAL at 05:43

## 2017-01-01 RX ADMIN — PRIMIDONE 100 MG: 50 TABLET ORAL at 15:32

## 2017-01-01 RX ADMIN — PROPOFOL 200 MG: 10 INJECTION, EMULSION INTRAVENOUS at 12:55

## 2017-01-01 RX ADMIN — DIGOXIN 125 MCG: 0.12 TABLET ORAL at 09:21

## 2017-01-01 RX ADMIN — FENTANYL CITRATE 50 MCG: 50 INJECTION, SOLUTION INTRAMUSCULAR; INTRAVENOUS at 11:08

## 2017-01-01 RX ADMIN — PRIMIDONE 100 MG: 50 TABLET ORAL at 21:16

## 2017-01-01 RX ADMIN — PRIMIDONE 100 MG: 50 TABLET ORAL at 05:43

## 2017-01-01 RX ADMIN — PANTOPRAZOLE SODIUM 40 MG: 40 TABLET, DELAYED RELEASE ORAL at 05:44

## 2017-01-01 RX ADMIN — HYDROCODONE BITARTRATE AND ACETAMINOPHEN 1 TABLET: 5; 325 TABLET ORAL at 20:36

## 2017-01-01 RX ADMIN — SODIUM CHLORIDE, POTASSIUM CHLORIDE, SODIUM LACTATE AND CALCIUM CHLORIDE 100 ML/HR: 600; 310; 30; 20 INJECTION, SOLUTION INTRAVENOUS at 13:14

## 2017-01-01 RX ADMIN — MORPHINE SULFATE 2 MG: 10 INJECTION, SOLUTION INTRAMUSCULAR; INTRAVENOUS at 11:24

## 2017-01-01 RX ADMIN — PRIMIDONE 100 MG: 50 TABLET ORAL at 21:53

## 2017-01-01 RX ADMIN — PRIMIDONE 100 MG: 50 TABLET ORAL at 06:12

## 2017-01-01 RX ADMIN — PROPAFENONE HYDROCHLORIDE 150 MG: 150 TABLET, COATED ORAL at 15:41

## 2017-01-01 RX ADMIN — ATORVASTATIN CALCIUM 40 MG: 40 TABLET, FILM COATED ORAL at 20:36

## 2017-01-01 RX ADMIN — SODIUM CHLORIDE 500 ML: 9 INJECTION, SOLUTION INTRAVENOUS at 12:33

## 2017-01-01 RX ADMIN — ONDANSETRON HYDROCHLORIDE 4 MG: 2 SOLUTION INTRAMUSCULAR; INTRAVENOUS at 11:00

## 2017-01-01 RX ADMIN — SINCALIDE 2 MCG: 5 INJECTION, POWDER, LYOPHILIZED, FOR SOLUTION INTRAVENOUS at 13:15

## 2017-01-01 RX ADMIN — DIGOXIN 125 MCG: 0.12 TABLET ORAL at 08:37

## 2017-01-01 RX ADMIN — LIDOCAINE HYDROCHLORIDE 0.5 ML: 10 INJECTION, SOLUTION EPIDURAL; INFILTRATION; INTRACAUDAL; PERINEURAL at 12:33

## 2017-01-01 RX ADMIN — ROCURONIUM BROMIDE 5 MG: 10 INJECTION INTRAVENOUS at 10:41

## 2017-01-01 RX ADMIN — HYDRALAZINE HYDROCHLORIDE 5 MG: 20 INJECTION INTRAMUSCULAR; INTRAVENOUS at 11:35

## 2017-01-01 RX ADMIN — METOPROLOL SUCCINATE 25 MG: 25 TABLET, FILM COATED, EXTENDED RELEASE ORAL at 09:21

## 2017-01-01 RX ADMIN — DEXAMETHASONE SODIUM PHOSPHATE 4 MG: 4 INJECTION, SOLUTION INTRAMUSCULAR; INTRAVENOUS at 10:00

## 2017-01-01 RX ADMIN — PROPAFENONE HYDROCHLORIDE 150 MG: 150 TABLET, COATED ORAL at 15:32

## 2017-01-01 RX ADMIN — PROPAFENONE HYDROCHLORIDE 150 MG: 150 TABLET, COATED ORAL at 21:15

## 2017-01-01 RX ADMIN — HYDROCODONE BITARTRATE AND ACETAMINOPHEN 1 TABLET: 5; 325 TABLET ORAL at 05:43

## 2017-01-01 RX ADMIN — FENTANYL CITRATE 50 MCG: 50 INJECTION, SOLUTION INTRAMUSCULAR; INTRAVENOUS at 11:11

## 2017-01-01 RX ADMIN — PROPOFOL 80 MG: 10 INJECTION, EMULSION INTRAVENOUS at 10:40

## 2017-01-01 RX ADMIN — ROCURONIUM BROMIDE 25 MG: 10 INJECTION INTRAVENOUS at 10:48

## 2017-01-01 RX ADMIN — PROPAFENONE HYDROCHLORIDE 150 MG: 150 TABLET, COATED ORAL at 06:12

## 2017-01-01 RX ADMIN — SUCCINYLCHOLINE CHLORIDE 100 MG: 20 INJECTION, SOLUTION INTRAMUSCULAR; INTRAVENOUS at 10:41

## 2017-01-01 RX ADMIN — Medication 3 MG: at 11:16

## 2017-01-01 RX ADMIN — LEVETIRACETAM 1000 MG: 500 TABLET, FILM COATED ORAL at 08:37

## 2017-01-01 RX ADMIN — LEVETIRACETAM 1000 MG: 500 TABLET, FILM COATED ORAL at 17:54

## 2017-01-01 RX ADMIN — ATORVASTATIN CALCIUM 40 MG: 40 TABLET, FILM COATED ORAL at 21:16

## 2017-01-01 RX ADMIN — LISINOPRIL 2.5 MG: 2.5 TABLET ORAL at 08:37

## 2017-01-01 RX ADMIN — GLYCOPYRROLATE 0.3 MG: 0.2 INJECTION, SOLUTION INTRAMUSCULAR; INTRAVENOUS at 11:16

## 2017-01-01 RX ADMIN — HYDROCODONE BITARTRATE AND ACETAMINOPHEN 1 TABLET: 5; 325 TABLET ORAL at 09:29

## 2017-01-01 RX ADMIN — MEBROFENIN 1 DOSE: 45 INJECTION, POWDER, LYOPHILIZED, FOR SOLUTION INTRAVENOUS at 13:00

## 2017-01-01 RX ADMIN — SODIUM CHLORIDE, POTASSIUM CHLORIDE, SODIUM LACTATE AND CALCIUM CHLORIDE 1000 ML: 600; 310; 30; 20 INJECTION, SOLUTION INTRAVENOUS at 08:38

## 2017-01-01 RX ADMIN — PANTOPRAZOLE SODIUM 40 MG: 40 TABLET, DELAYED RELEASE ORAL at 06:31

## 2017-01-01 RX ADMIN — SODIUM CHLORIDE, POTASSIUM CHLORIDE, SODIUM LACTATE AND CALCIUM CHLORIDE 100 ML/HR: 600; 310; 30; 20 INJECTION, SOLUTION INTRAVENOUS at 08:37

## 2017-01-01 RX ADMIN — FENTANYL CITRATE 50 MCG: 50 INJECTION, SOLUTION INTRAMUSCULAR; INTRAVENOUS at 10:41

## 2017-01-01 RX ADMIN — LIDOCAINE HYDROCHLORIDE 60 MG: 20 INJECTION, SOLUTION INFILTRATION; PERINEURAL at 10:40

## 2017-01-01 RX ADMIN — FLUTICASONE PROPIONATE 1 SPRAY: 50 SPRAY, METERED NASAL at 15:42

## 2017-01-01 RX ADMIN — PROPOFOL 50 MG: 10 INJECTION, EMULSION INTRAVENOUS at 10:44

## 2017-01-01 RX ADMIN — LIDOCAINE HYDROCHLORIDE 0.5 ML: 10 INJECTION, SOLUTION EPIDURAL; INFILTRATION; INTRACAUDAL; PERINEURAL at 08:39

## 2017-01-01 RX ADMIN — MORPHINE SULFATE 2 MG: 10 INJECTION, SOLUTION INTRAMUSCULAR; INTRAVENOUS at 11:30

## 2017-01-01 RX ADMIN — FENTANYL CITRATE 50 MCG: 50 INJECTION, SOLUTION INTRAMUSCULAR; INTRAVENOUS at 10:37

## 2017-01-01 RX ADMIN — METOPROLOL SUCCINATE 25 MG: 25 TABLET, FILM COATED, EXTENDED RELEASE ORAL at 08:37

## 2017-01-01 RX ADMIN — LISINOPRIL 2.5 MG: 2.5 TABLET ORAL at 09:21

## 2017-01-01 RX ADMIN — FLUTICASONE PROPIONATE 1 SPRAY: 50 SPRAY, METERED NASAL at 08:37

## 2017-01-01 RX ADMIN — LEVETIRACETAM 1000 MG: 500 TABLET, FILM COATED ORAL at 09:21

## 2017-01-01 RX ADMIN — PROPAFENONE HYDROCHLORIDE 150 MG: 150 TABLET, COATED ORAL at 21:52

## 2017-01-01 RX ADMIN — FLUTICASONE PROPIONATE 1 SPRAY: 50 SPRAY, METERED NASAL at 09:21

## 2017-01-01 RX ADMIN — PRIMIDONE 100 MG: 50 TABLET ORAL at 15:35

## 2017-01-01 RX ADMIN — PANTOPRAZOLE SODIUM 40 MG: 40 TABLET, DELAYED RELEASE ORAL at 15:45

## 2017-01-01 RX ADMIN — HYDROCODONE BITARTRATE AND ACETAMINOPHEN 1 TABLET: 5; 325 TABLET ORAL at 15:59

## 2017-01-01 RX ADMIN — LIDOCAINE HYDROCHLORIDE 100 MG: 20 INJECTION, SOLUTION INFILTRATION; PERINEURAL at 12:55

## 2017-01-01 RX ADMIN — CEFAZOLIN 1 G: 1 INJECTION, POWDER, FOR SOLUTION INTRAMUSCULAR; INTRAVENOUS; PARENTERAL at 10:50

## 2017-01-10 ENCOUNTER — OFFICE VISIT (OUTPATIENT)
Dept: CARDIOLOGY | Facility: CLINIC | Age: 77
End: 2017-01-10

## 2017-01-10 VITALS
HEIGHT: 60 IN | DIASTOLIC BLOOD PRESSURE: 82 MMHG | WEIGHT: 166 LBS | SYSTOLIC BLOOD PRESSURE: 118 MMHG | HEART RATE: 55 BPM | BODY MASS INDEX: 32.59 KG/M2

## 2017-01-10 DIAGNOSIS — I48.0 PAROXYSMAL ATRIAL FIBRILLATION (HCC): ICD-10-CM

## 2017-01-10 DIAGNOSIS — I10 ESSENTIAL HYPERTENSION: Primary | Chronic | ICD-10-CM

## 2017-01-10 DIAGNOSIS — R00.1 BRADYCARDIA: Chronic | ICD-10-CM

## 2017-01-10 DIAGNOSIS — G25.0 TREMOR, ESSENTIAL: Chronic | ICD-10-CM

## 2017-01-10 DIAGNOSIS — Z79.01 CHRONIC ANTICOAGULATION: Chronic | ICD-10-CM

## 2017-01-10 PROCEDURE — 93000 ELECTROCARDIOGRAM COMPLETE: CPT | Performed by: INTERNAL MEDICINE

## 2017-01-10 PROCEDURE — 99214 OFFICE O/P EST MOD 30 MIN: CPT | Performed by: INTERNAL MEDICINE

## 2017-01-10 NOTE — MR AVS SNAPSHOT
Daphne Santos   1/10/2017 9:00 AM   Office Visit    Dept Phone:  [phone]   Encounter #:  [id number]    Provider:  Alfonso Treviño MD   Department:  [facility]                Your Full Care Plan              Your Updated Medication List          This list is accurate as of: 1/10/17 10:08 AM.  Always use your most recent med list.                acetaminophen 325 MG tablet   Commonly known as:  TYLENOL       aspirin 81 MG EC tablet       atorvastatin 40 MG tablet   Commonly known as:  LIPITOR   TAKE ONE TABLET NIGHTLY       CALCIUM 600+D 600-400 MG-UNIT per tablet   Generic drug:  calcium carbonate-vitamin d       conjugated estrogens 0.625 MG/GM vaginal cream   Commonly known as:  PREMARIN       digoxin 125 MCG tablet   Commonly known as:  LANOXIN       levETIRAcetam 1000 MG tablet   Commonly known as:  KEPPRA   Take 1 tablet by mouth 2 (Two) Times a Day.       lisinopril 2.5 MG tablet   Commonly known as:  PRINIVIL,ZESTRIL   TAKE ONE TABLET DAILY       metoprolol succinate XL 25 MG 24 hr tablet   Commonly known as:  TOPROL-XL   TAKE ONE TABLET DAILY       * MULTIVITAMIN ADULT PO       * EYE VITAMINS PO       omeprazole 20 MG capsule   Commonly known as:  priLOSEC       polyethylene glycol packet   Commonly known as:  MIRALAX       potassium chloride 20 MEQ CR tablet   Commonly known as:  K-DUR,KLOR-CON   TAKE ONE TABLET TWICE DAILY       primidone 50 MG tablet   Commonly known as:  MYSOLINE   Take 2 tablets by mouth 3 (Three) Times a Day.       propafenone 150 MG tablet   Commonly known as:  RHTHYMOL       * Notice:  This list has 2 medication(s) that are the same as other medications prescribed for you. Read the directions carefully, and ask your doctor or other care provider to review them with you.            We Performed the Following     ECG 12 Lead       You Were Diagnosed With        Codes Comments    Essential hypertension    -  Primary ICD-10-CM:  I10  ICD-9-CM: 401.9     Bradycardia     ICD-10-CM: R00.1  ICD-9-CM: 427.89     Tremor, essential     ICD-10-CM: G25.0  ICD-9-CM: 333.1     Paroxysmal atrial fibrillation     ICD-10-CM: I48.0  ICD-9-CM: 427.31     Chronic anticoagulation     ICD-10-CM: Z79.01  ICD-9-CM: V58.61       Instructions     None    Patient Instructions History      Upcoming Appointments     Visit Type Date Time Department    FOLLOW UP 1/10/2017  9:00 AM [facility] PAD    [facility]    FOLLOW UP 2017  2:00 PM [facility] NEUROLOGY SPE PAD    FOLLOW UP 1/10/2018  9:00 AM [facility] HEART GROUP PAD      MyChart Signup     [facility] allows you to send messages to your doctor, view your test results, renew your prescriptions, schedule appointments, and more. To sign up, go to [employer] and click on the Sign Up Now link in the New User? box. Enter your [place] Activation Code exactly as it appears below along with the last four digits of your Social Security Number and your Date of Birth () to complete the sign-up process. If you do not sign up before the expiration date, you must request a new code.    [place] Activation Code: [email or web address]  Expires: 2017 10:07 AM    If you have questions, you can email "[email or web address] or call [phone] to talk to our [employer] staff. Remember, [employer] is NOT to be used for urgent needs. For medical emergencies, dial 911.               Other Info from Your Visit           Your Appointments     2017  9:45 AM CST   Follow Up with Tre Sevilla MD   [facility] MEDICINE (--)    [address] [place] [id number] [phone]           Arrive 15 minutes prior to appointment.            2017  2:00 PM CDT   Follow Up with CHRISTIANE Gaxiola   [facility] NEUROLOGY (--)    [address]  [facility] [address] [phone]           Arrive 15 minutes  "prior to appointment.            Sorin 10, 2018  9:00 AM CST   Follow Up with Alfonso Treviño MD   [facility] HEART GROUP (--)    [address] [place] [address] [address]  [facility] [address] [phone]           Arrive 15 minutes prior to appointment.              Allergies     Contrast Dye Allergy Hives    Nexium [Esomeprazole]        Reason for Visit     Atrial Fibrillation 6 MON FU       Vital Signs     Blood Pressure Pulse Height Weight Body Mass Index Smoking Status    118/82 55 60\" (152.4 cm) 166 lb (75.3 kg) 32.42 kg/m2 Never Smoker      Problems and Diagnoses Noted     Atrial fibrillation (irregular heartbeat)    Bradycardia    Long term use of blood thinners    High blood pressure    Tremor, essential      Results     ECG 12 Lead               "

## 2017-01-10 NOTE — LETTER
January 10, 2017     Tre Sevilla MD  [address] [place] [id number]    Patient: Daphne Santos   YOB: 1940   Date of Visit: 1/10/2017       Dear Dr. Roel MD:    Thank you for referring Daphne Santos to me for evaluation. Below are the relevant portions of my assessment and plan of care.    If you have questions, please do not hesitate to call me. I look forward to following Daphne along with you.         Sincerely,        Alfonso Treviño MD        CC: No Recipients  Alfonso Treviño MD  1/10/2017 10:05 AM  Signed  Daphne Santos  [id number]  1940  76 y.o.  female    Referring Provider: Tre Sevilla MD    Reason for Follow-up Visit: PAF  Overall doing well  Denies any chest pain or excessive shortness of breath  Compliant with medications    History of present illness:  Daphne Santos is a 76 y.o. yo female with history of PAF who presents today for   Chief Complaint   Patient presents with   • Atrial Fibrillation     6 MON FU    .    History  Past Medical History   Diagnosis Date   • Atrial fibrillation    • Chest pain    • Hypertension    • Seizures    • Stroke    • Syncope and collapse    • Tremor    • Ventricular tachycardia (paroxysmal)    ,   Past Surgical History   Procedure Laterality Date   • Cataract extraction     • Hysterectomy     ,   Family History   Problem Relation Age of Onset   • Heart disease Mother    • Stroke Mother    • Heart failure Mother    • Cancer Father    • No Known Problems Sister    • No Known Problems Brother    • No Known Problems Sister    ,   Social History   Substance Use Topics   • Smoking status: Never Smoker   • Smokeless tobacco: Never Used   • Alcohol use No   ,     Medications  Current Outpatient Prescriptions   Medication Sig Dispense Refill   • acetaminophen (TYLENOL) 325 MG tablet Take 650 mg by mouth every night.     • aspirin 81 MG EC tablet Take 81 mg by mouth daily.     • atorvastatin (LIPITOR) 40 MG tablet TAKE ONE TABLET NIGHTLY 90  tablet 1   • calcium carbonate-vitamin d (CALCIUM 600+D) 600-400 MG-UNIT per tablet Take 1 tablet by mouth 2 (two) times a day.     • conjugated estrogens (PREMARIN) 0.625 MG/GM vaginal cream Insert 30 g into the vagina 3 (three) times a week.     • digoxin (LANOXIN) 125 MCG tablet Take 62.5 mcg by mouth every day.     • levETIRAcetam (KEPPRA) 1000 MG tablet Take 1 tablet by mouth 2 (Two) Times a Day. 60 tablet 5   • lisinopril (PRINIVIL,ZESTRIL) 2.5 MG tablet TAKE ONE TABLET DAILY 90 tablet 1   • metoprolol succinate XL (TOPROL-XL) 25 MG 24 hr tablet TAKE ONE TABLET DAILY 90 tablet 1   • Multiple Vitamins-Minerals (EYE VITAMINS PO) Take 1 tablet by mouth daily.     • Multiple Vitamins-Minerals (MULTIVITAMIN ADULT PO) Take 1 tablet by mouth daily.     • omeprazole (PriLOSEC) 20 MG capsule Take 20 mg by mouth daily.     • polyethylene glycol (MIRALAX) packet Take 17 g by mouth daily.     • potassium chloride (K-DUR,KLOR-CON) 20 MEQ CR tablet TAKE ONE TABLET TWICE DAILY 180 tablet 1   • primidone (MYSOLINE) 50 MG tablet Take 2 tablets by mouth 3 (Three) Times a Day. 180 tablet 5   • propafenone (RHTHYMOL) 150 MG tablet Take 150 mg by mouth 3 (three) times a day.       No current facility-administered medications for this visit.        Allergies:  Contrast dye and Nexium [esomeprazole]    Review of Systems  Review of Systems   Constitution: Negative.   HENT: Negative.    Eyes: Negative.    Cardiovascular: Negative for chest pain, claudication, cyanosis, dyspnea on exertion, irregular heartbeat, leg swelling, near-syncope, orthopnea, palpitations, paroxysmal nocturnal dyspnea and syncope.   Respiratory: Negative.    Endocrine: Negative.    Hematologic/Lymphatic: Negative.    Skin: Negative.    Gastrointestinal: Negative for anorexia.   Genitourinary: Negative.    Neurological: Positive for light-headedness.   Psychiatric/Behavioral: Negative.        Objective     Physical Exam:  Visit Vitals   • /82   • Pulse 55  "  • Ht 60\" (152.4 cm)   • Wt 166 lb (75.3 kg)   • BMI 32.42 kg/m2     Physical Exam   Constitutional: She appears well-developed.   HENT:   Head: Normocephalic.   Neck: Normal carotid pulses and no JVD present. No tracheal tenderness present. Carotid bruit is not present. No tracheal deviation and no edema present.   Cardiovascular: Regular rhythm and normal pulses.    Murmur heard.   Systolic murmur is present with a grade of 2/6   Pulmonary/Chest: Effort normal. No stridor.   Abdominal: Soft.   Neurological: She is alert. She has normal strength. No cranial nerve deficit or sensory deficit.   Skin: Skin is warm.   Psychiatric: She has a normal mood and affect. Her speech is normal and behavior is normal.       Results Review:       ECG 12 Lead  Date/Time: 1/10/2017 10:01 AM  Performed by: OBIE MCKINNON  Authorized by: OBIE MCKINNON   Comparison: compared with previous ECG from 7/5/2016  Similar to previous ECG  Rhythm: sinus bradycardia  Rate: bradycardic  ST Depression: I, aVL, V5 and V6  QRS axis: normal  Clinical impression: abnormal ECG            Assessment/Plan   Patient Active Problem List   Diagnosis   • Late effects of CVA (cerebrovascular accident)   • Tremor, essential   • Gait abnormality   • Chronic anticoagulation   • Hypertension   • Bradycardia   • Intractable epilepsy without status epilepticus   • Atrial fibrillation   • Chronic ischemic right MCA stroke       Stable doing well. No exertional chest pain or excessive dyspnea. No palpitations. No significant pedal edema. Compliant with medications and diet. Latest labs and medications reviewed.    Plan:  Cannot take anticoagulation due to recurrent falls from lack of balance  Close follow up with you as scheduled.  Intensive factor modifications.  See order list.    Counseled regarding disease appropriate diet, fluid, caffeine, stimulants and sodium intake as well as importance of compliance to diet, exercise and regular follow up.    Return in about " 1 year (around 1/10/2018).

## 2017-01-10 NOTE — PROGRESS NOTES
Daphne Santos  [id number]  1940  76 y.o.  female    Referring Provider: Tre Sevilla MD    Reason for Follow-up Visit: PAF  Overall doing well  Denies any chest pain or excessive shortness of breath  Compliant with medications    History of present illness:  Daphne Santos is a 76 y.o. yo female with history of PAF who presents today for   Chief Complaint   Patient presents with   • Atrial Fibrillation     6 MON FU    .    History  Past Medical History   Diagnosis Date   • Atrial fibrillation    • Chest pain    • Hypertension    • Seizures    • Stroke    • Syncope and collapse    • Tremor    • Ventricular tachycardia (paroxysmal)    ,   Past Surgical History   Procedure Laterality Date   • Cataract extraction     • Hysterectomy     ,   Family History   Problem Relation Age of Onset   • Heart disease Mother    • Stroke Mother    • Heart failure Mother    • Cancer Father    • No Known Problems Sister    • No Known Problems Brother    • No Known Problems Sister    ,   Social History   Substance Use Topics   • Smoking status: Never Smoker   • Smokeless tobacco: Never Used   • Alcohol use No   ,     Medications  Current Outpatient Prescriptions   Medication Sig Dispense Refill   • acetaminophen (TYLENOL) 325 MG tablet Take 650 mg by mouth every night.     • aspirin 81 MG EC tablet Take 81 mg by mouth daily.     • atorvastatin (LIPITOR) 40 MG tablet TAKE ONE TABLET NIGHTLY 90 tablet 1   • calcium carbonate-vitamin d (CALCIUM 600+D) 600-400 MG-UNIT per tablet Take 1 tablet by mouth 2 (two) times a day.     • conjugated estrogens (PREMARIN) 0.625 MG/GM vaginal cream Insert 30 g into the vagina 3 (three) times a week.     • digoxin (LANOXIN) 125 MCG tablet Take 62.5 mcg by mouth every day.     • levETIRAcetam (KEPPRA) 1000 MG tablet Take 1 tablet by mouth 2 (Two) Times a Day. 60 tablet 5   • lisinopril (PRINIVIL,ZESTRIL) 2.5 MG tablet TAKE ONE TABLET DAILY 90 tablet 1   • metoprolol succinate XL (TOPROL-XL) 25  "MG 24 hr tablet TAKE ONE TABLET DAILY 90 tablet 1   • Multiple Vitamins-Minerals (EYE VITAMINS PO) Take 1 tablet by mouth daily.     • Multiple Vitamins-Minerals (MULTIVITAMIN ADULT PO) Take 1 tablet by mouth daily.     • omeprazole (PriLOSEC) 20 MG capsule Take 20 mg by mouth daily.     • polyethylene glycol (MIRALAX) packet Take 17 g by mouth daily.     • potassium chloride (K-DUR,KLOR-CON) 20 MEQ CR tablet TAKE ONE TABLET TWICE DAILY 180 tablet 1   • primidone (MYSOLINE) 50 MG tablet Take 2 tablets by mouth 3 (Three) Times a Day. 180 tablet 5   • propafenone (RHTHYMOL) 150 MG tablet Take 150 mg by mouth 3 (three) times a day.       No current facility-administered medications for this visit.        Allergies:  Contrast dye and Nexium [esomeprazole]    Review of Systems  Review of Systems   Constitution: Negative.   HENT: Negative.    Eyes: Negative.    Cardiovascular: Negative for chest pain, claudication, cyanosis, dyspnea on exertion, irregular heartbeat, leg swelling, near-syncope, orthopnea, palpitations, paroxysmal nocturnal dyspnea and syncope.   Respiratory: Negative.    Endocrine: Negative.    Hematologic/Lymphatic: Negative.    Skin: Negative.    Gastrointestinal: Negative for anorexia.   Genitourinary: Negative.    Neurological: Positive for light-headedness.   Psychiatric/Behavioral: Negative.        Objective     Physical Exam:  Visit Vitals   • /82   • Pulse 55   • Ht 60\" (152.4 cm)   • Wt 166 lb (75.3 kg)   • BMI 32.42 kg/m2     Physical Exam   Constitutional: She appears well-developed.   HENT:   Head: Normocephalic.   Neck: Normal carotid pulses and no JVD present. No tracheal tenderness present. Carotid bruit is not present. No tracheal deviation and no edema present.   Cardiovascular: Regular rhythm and normal pulses.    Murmur heard.   Systolic murmur is present with a grade of 2/6   Pulmonary/Chest: Effort normal. No stridor.   Abdominal: Soft.   Neurological: She is alert. She has " normal strength. No cranial nerve deficit or sensory deficit.   Skin: Skin is warm.   Psychiatric: She has a normal mood and affect. Her speech is normal and behavior is normal.       Results Review:       ECG 12 Lead  Date/Time: 1/10/2017 10:01 AM  Performed by: OBIE MCKINNON  Authorized by: OBIE MCKINNON   Comparison: compared with previous ECG from 7/5/2016  Similar to previous ECG  Rhythm: sinus bradycardia  Rate: bradycardic  ST Depression: I, aVL, V5 and V6  QRS axis: normal  Clinical impression: abnormal ECG            Assessment/Plan   Patient Active Problem List   Diagnosis   • Late effects of CVA (cerebrovascular accident)   • Tremor, essential   • Gait abnormality   • Chronic anticoagulation   • Hypertension   • Bradycardia   • Intractable epilepsy without status epilepticus   • Atrial fibrillation   • Chronic ischemic right MCA stroke       Stable doing well. No exertional chest pain or excessive dyspnea. No palpitations. No significant pedal edema. Compliant with medications and diet. Latest labs and medications reviewed.    Plan:  Cannot take anticoagulation due to recurrent falls from lack of balance  Close follow up with you as scheduled.  Intensive factor modifications.  See order list.    Counseled regarding disease appropriate diet, fluid, caffeine, stimulants and sodium intake as well as importance of compliance to diet, exercise and regular follow up.    Return in about 1 year (around 1/10/2018).

## 2017-01-30 RX ORDER — ATORVASTATIN CALCIUM 40 MG/1
TABLET, FILM COATED ORAL
Qty: 90 TABLET | Refills: 1 | Status: SHIPPED | OUTPATIENT
Start: 2017-01-30 | End: 2018-01-01 | Stop reason: SDUPTHER

## 2017-01-30 RX ORDER — POTASSIUM CHLORIDE 20 MEQ/1
TABLET, EXTENDED RELEASE ORAL
Qty: 180 TABLET | Refills: 1 | Status: SHIPPED | OUTPATIENT
Start: 2017-01-30 | End: 2017-05-02 | Stop reason: SDUPTHER

## 2017-01-30 RX ORDER — LISINOPRIL 2.5 MG/1
TABLET ORAL
Qty: 90 TABLET | Refills: 1 | Status: SHIPPED | OUTPATIENT
Start: 2017-01-30 | End: 2017-01-01 | Stop reason: SDUPTHER

## 2017-02-13 RX ORDER — DIGOXIN 125 MCG
TABLET ORAL
Qty: 15 TABLET | Refills: 5 | Status: SHIPPED | OUTPATIENT
Start: 2017-02-13 | End: 2017-03-03 | Stop reason: SDUPTHER

## 2017-02-22 PROBLEM — E66.9 OBESITY: Status: ACTIVE | Noted: 2017-02-22

## 2017-02-22 PROBLEM — R09.89 CAROTID BRUIT: Status: ACTIVE | Noted: 2017-02-22

## 2017-02-22 PROBLEM — L84 CORNS AND CALLOSITIES: Status: ACTIVE | Noted: 2017-02-22

## 2017-02-22 PROBLEM — E03.9 HYPOTHYROIDISM: Chronic | Status: ACTIVE | Noted: 2017-02-22

## 2017-02-22 PROBLEM — Z00.00 WELLNESS EXAMINATION: Status: ACTIVE | Noted: 2017-02-22

## 2017-02-22 PROBLEM — I50.9 CONGESTIVE HEART FAILURE (HCC): Status: ACTIVE | Noted: 2017-02-22

## 2017-02-22 PROBLEM — K21.9 GASTROESOPHAGEAL REFLUX DISEASE: Status: ACTIVE | Noted: 2017-02-22

## 2017-02-22 PROBLEM — F32.A DEPRESSION: Chronic | Status: ACTIVE | Noted: 2017-02-22

## 2017-02-22 PROBLEM — H35.30 MACULAR DEGENERATION: Chronic | Status: ACTIVE | Noted: 2017-02-22

## 2017-02-22 PROBLEM — E78.5 HYPERLIPIDEMIA: Chronic | Status: ACTIVE | Noted: 2017-02-22

## 2017-02-22 PROBLEM — F41.9 ANXIETY: Chronic | Status: ACTIVE | Noted: 2017-02-22

## 2017-02-22 PROBLEM — K76.0 FATTY LIVER: Status: ACTIVE | Noted: 2017-02-22

## 2017-02-27 ENCOUNTER — OFFICE VISIT (OUTPATIENT)
Dept: FAMILY MEDICINE CLINIC | Facility: CLINIC | Age: 77
End: 2017-02-27

## 2017-02-27 VITALS
DIASTOLIC BLOOD PRESSURE: 66 MMHG | HEART RATE: 64 BPM | HEIGHT: 60 IN | TEMPERATURE: 97.8 F | SYSTOLIC BLOOD PRESSURE: 128 MMHG | WEIGHT: 164 LBS | OXYGEN SATURATION: 91 % | RESPIRATION RATE: 18 BRPM | BODY MASS INDEX: 32.2 KG/M2

## 2017-02-27 DIAGNOSIS — G40.919 INTRACTABLE EPILEPSY WITHOUT STATUS EPILEPTICUS, UNSPECIFIED EPILEPSY TYPE (HCC): ICD-10-CM

## 2017-02-27 DIAGNOSIS — E87.6 HYPOPOTASSEMIA: ICD-10-CM

## 2017-02-27 DIAGNOSIS — I49.8 OTHER CARDIAC ARRHYTHMIA: Chronic | ICD-10-CM

## 2017-02-27 DIAGNOSIS — K21.9 GASTROESOPHAGEAL REFLUX DISEASE, ESOPHAGITIS PRESENCE NOT SPECIFIED: ICD-10-CM

## 2017-02-27 DIAGNOSIS — Z79.01 CHRONIC ANTICOAGULATION: Chronic | ICD-10-CM

## 2017-02-27 DIAGNOSIS — E03.9 HYPOTHYROIDISM, UNSPECIFIED TYPE: Chronic | ICD-10-CM

## 2017-02-27 DIAGNOSIS — G25.0 TREMOR, ESSENTIAL: Chronic | ICD-10-CM

## 2017-02-27 DIAGNOSIS — I69.90 LATE EFFECTS OF CVA (CEREBROVASCULAR ACCIDENT): Chronic | ICD-10-CM

## 2017-02-27 DIAGNOSIS — I10 ESSENTIAL HYPERTENSION: Primary | Chronic | ICD-10-CM

## 2017-02-27 DIAGNOSIS — I50.9 CONGESTIVE HEART FAILURE, UNSPECIFIED CONGESTIVE HEART FAILURE CHRONICITY, UNSPECIFIED CONGESTIVE HEART FAILURE TYPE: ICD-10-CM

## 2017-02-27 PROBLEM — I49.9 CARDIAC ARRHYTHMIA: Chronic | Status: ACTIVE | Noted: 2017-02-27

## 2017-02-27 PROCEDURE — 99214 OFFICE O/P EST MOD 30 MIN: CPT | Performed by: FAMILY MEDICINE

## 2017-02-27 NOTE — PROGRESS NOTES
Subjective   Daphne Santos is a 76 y.o. female presenting with chief complaint of:   Chief Complaint   Patient presents with   • Hypertension   • Congestive Heart Failure   • Hypothyroidism   • Hyperlipidemia   • Heartburn       History of Present Illness :  With sister.  Has multiple chronic problems; interval appointment.  One of these problems is  HTN.   The HTN has been present for years/it is chronic.  The HTN is assumed essential/without testing needed to look for other.  The HTN is controlled manifest by todays blood pressure and no home monitoring.   Other chronic problem/s to consider:  Cardiac arrthymia: This has been present for years/over a year. It is chronic.  The arrthymia is primarily atrial/a fib   The rhythm is not associated with syncope/near syncope, dizziness, or weakness. Medications being used help.  Gait decline: This has been present for years/over a year.  It is chronic.  It is influenced by late effects of CVA.   GE reflux: This has been present for years/over a year.  It is chronic.   It is stable as there is no change in infrequent heartburn and no dysphagia  Hypothyroidism: This has been present for years/over a year.  It is chronic.  It is stable as there has been stable labs and energy level, hair-skin texture, stooling, are all the same; no palpitations.  Anticoagulation: Requires anticoagulation with ASA 81 for a fib/history CVA (with fall risk; not using other Rx currently)  Tremor:  Has had for years; it is chronic.  Has been seen by neurology; agreed essential/famialial.  She feels it is no worse.  Has tried Rx/currently using mysoline.   Hypopotassemia: Has had on/off for years; replaced.     The following portions of the patient's history were reviewed and updated as appropriate: allergies, current medications, past family history, past medical history, past social history, past surgical history and problem list.  TCC migrated if needed/reviewed.      Current Outpatient  Prescriptions:   •  acetaminophen (TYLENOL) 325 MG tablet, Take 650 mg by mouth every night., Disp: , Rfl:   •  aspirin 81 MG EC tablet, Take 81 mg by mouth daily., Disp: , Rfl:   •  atorvastatin (LIPITOR) 40 MG tablet, TAKE ONE TABLET NIGHTLY, Disp: 90 tablet, Rfl: 1  •  calcium carbonate-vitamin d (CALCIUM 600+D) 600-400 MG-UNIT per tablet, Take 1 tablet by mouth 2 (two) times a day., Disp: , Rfl:   •  conjugated estrogens (PREMARIN) 0.625 MG/GM vaginal cream, Insert 30 g into the vagina 3 (three) times a week., Disp: , Rfl:   •  digoxin (LANOXIN) 125 MCG tablet, TAKE 1/2 TABLET DAILY, Disp: 15 tablet, Rfl: 5  •  levETIRAcetam (KEPPRA) 1000 MG tablet, Take 1 tablet by mouth 2 (Two) Times a Day., Disp: 60 tablet, Rfl: 5  •  lisinopril (PRINIVIL,ZESTRIL) 2.5 MG tablet, TAKE ONE TABLET DAILY, Disp: 90 tablet, Rfl: 1  •  metoprolol succinate XL (TOPROL-XL) 25 MG 24 hr tablet, TAKE ONE TABLET DAILY, Disp: 90 tablet, Rfl: 1  •  Multiple Vitamins-Minerals (EYE VITAMINS PO), Take 1 tablet by mouth daily., Disp: , Rfl:   •  Multiple Vitamins-Minerals (MULTIVITAMIN ADULT PO), Take 1 tablet by mouth daily., Disp: , Rfl:   •  omeprazole (PriLOSEC) 20 MG capsule, Take 20 mg by mouth daily., Disp: , Rfl:   •  polyethylene glycol (MIRALAX) packet, Take 17 g by mouth daily., Disp: , Rfl:   •  potassium chloride (K-DUR,KLOR-CON) 20 MEQ CR tablet, TAKE ONE TABLET TWICE DAILY, Disp: 180 tablet, Rfl: 1  •  primidone (MYSOLINE) 50 MG tablet, Take 2 tablets by mouth 3 (Three) Times a Day., Disp: 180 tablet, Rfl: 5  •  propafenone (RHTHYMOL) 150 MG tablet, Take 150 mg by mouth 3 (three) times a day., Disp: , Rfl:     Allergies   Allergen Reactions   • Contrast Dye Hives   • Nexium [Esomeprazole]        Review of Systems  GENERAL:  Inactive/slower with limits, speed, samni for age and unsteady gait; with rolling walker.  Fortunately; no falls. Living at [facility] is still working out ok . Sleep is ok. No fever.  ENDO:  No syncope,  near or diaphoretic sweaty spells.  HEENT: No head injury  headache,  No vision change, Nohearing loss.  Ears without pain/drainage.  No sore throat.  No nasal/sinus congestion/drainage. No epistaxis.  CHEST: No chest wall tenderness or mass. Nocough, without wheeze, SOB; no hemoptysis.  CV: No chest pain, palpatations, ankle edema.  GI: No heartburn, dysphagia.  No abdominal pain, diarrhea, constipation, rectal bleeding, or melena.    :  Voids without dysuria, or incontience to completion.  ORTHO: No painful/swollen joints but various on /off sore.  No  sore neck or back.  No acute neck or back pain without recent injury.   NEURO: No dizziness, weakness of extremities.  No numbness/parethesias. Tremor UE.   PSYCH: No memory loss.  Mood good; not that anxious, depressed but/and not suicidal.  Tolerated stress.     Results for orders placed or performed in visit on 10/05/16   Urine Culture (Clean Catch)   Result Value Ref Range    Urine Culture Final report     Result 1 Comment    Urinalysis (clean catch)   Result Value Ref Range    Specific Gravity, UA 1.019 1.005 - 1.030    pH, UA 7.5 5.0 - 8.0    Color, UA Yellow     Appearance, UA Clear Clear    Leukocytes, UA See below: (A) Negative    Protein Comment Negative    Glucose, UA Comment Negative    Ketones Comment Negative    Blood, UA Comment Negative    Bilirubin, UA Comment Negative    Urobilinogen, UA Comment     Nitrite, UA Comment Negative       Lab Results   Component Value Date    HGBA1C 5.3 05/05/2016    HGBA1C 5.3 09/30/2015    HGBA1C 5.5 04/10/2015       Lab Results   Component Value Date     06/08/2016     05/10/2016     05/09/2016    K 4.0 06/08/2016    K 4.4 05/10/2016    K 4.7 05/09/2016    CL 98 06/08/2016     05/10/2016     05/09/2016    CO2 31 06/08/2016    CO2 26 05/10/2016    CO2 26 05/09/2016    GLUCOSE 124 (H) 06/08/2016    GLUCOSE 91 05/10/2016    GLUCOSE 94 05/09/2016    BUN 19 06/08/2016    BUN 26 (H)  "05/10/2016    BUN 26 (H) 05/09/2016    CREATININE 0.76 06/08/2016    CREATININE 0.72 05/10/2016    CREATININE 0.76 05/09/2016    CALCIUM 9.9 06/08/2016    CALCIUM 9.6 05/10/2016    CALCIUM 9.3 05/09/2016    EGFRCLEARA 75 06/08/2016    EGFRCLEARA 79 05/10/2016    EGFRCLEARA 75 05/09/2016       No results found for: PSA     Objective   Visit Vitals   • /66 (BP Location: Right arm, Patient Position: Sitting, Cuff Size: Large Adult)   • Pulse 64   • Temp 97.8 °F (36.6 °C) (Oral)   • Resp 18   • Ht 60\" (152.4 cm)   • Wt 164 lb (74.4 kg)   • SpO2 91%   • Breastfeeding No   • BMI 32.03 kg/m2       Physical Exam  GENERAL:  Well nourished/developed in no acute distress. Obese  SKIN: Turgor excellent, without wound, rash, lesion.  HEENT:  L facial weakness; otherwise normocephalic without trauma.  Pupils equal round reactive to light. Extraocular motions full without nystagmus.   External canals nonobstructive nontender without reddness. Tymphatic membranes shilpi with elin structures intact.   Oral cavity without growths, exudates, and moist.  Posterior pharnyx without mass, obstruction, reddness.  No thyroidmegaly, mass, tenderness, lymphadenopathy and supple.  CV: Regular rhythm.  No murmur, gallop,  edema. Posterior pulses intact.  No carotid bruits.  CHEST: No chest wall tenderness or mass.   LUNGS: Symmetric motion with clear to auscultation.   ABD: Soft, nontender without mass.   ORTHO: Symmetric extremities without swelling/point tenderness.  Full gross range of motion except reduced abduction L shoulder.  NEURO: CN 2-12 grossly intact except L facial droop/mild.  Asymmetric facies..  UE/LE   3/5 strength throughout except mild weakness LUE/LLE. Speech minor slurring.    PSYCH: Oriented x 3.  Pleasant calm, well kept.  Purposeful/directed conservation with intact short/long gross memory.     Assessment/Plan     1. Essential hypertension  controlled  - CBC & Differential  - Comprehensive metabolic panel    2. " Other cardiac arrhythmia  A fib in past    3. Congestive heart failure, unspecified congestive heart failure chronicity, unspecified congestive heart failure type  Diastolic to date; asx  - CBC & Differential  - Comprehensive metabolic panel    4. Hypothyroidism, unspecified type  replaced  - TSH    5. 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 Intractable epilepsy without status epilepticus, unspecified epilepsy type  No more    7. Hypopotassemia  replaced    8. Gastroesophageal reflux disease, esophagitis presence not specified  asx    9. Tremor, essential  chronic    10. Chronic anticoagulation-deferred/fall risk  ASA 81,     Declines mammogram/screening  Rx: reviewed.  Changes if above   LAB: reviewed/above, and if orders today CBC, CMP, TSH  Wrap-up/other instructions  Regular cardio exercise something everyone should consider and try to do; discussed  Normal weight a goal for everyone; discussed  Healthy diet helpful for weight management, illness prevention; discussed  If over 50-screening exams include men PSA/rectal exam, women mammograms, and  everyone colonoscopy screening for colon cancer.   There are no Patient Instructions on file for this visit.    Follow up: Return in about 6 months (around 8/27/2017).

## 2017-02-28 LAB
ALBUMIN SERPL-MCNC: 4 G/DL (ref 3.5–5)
ALBUMIN/GLOB SERPL: 1.4 G/DL (ref 1.1–2.5)
ALP SERPL-CCNC: 65 U/L (ref 24–120)
ALT SERPL-CCNC: 38 U/L (ref 0–54)
AST SERPL-CCNC: 35 U/L (ref 7–45)
BASOPHILS # BLD AUTO: 0.03 10*3/MM3 (ref 0–0.2)
BASOPHILS NFR BLD AUTO: 0.6 % (ref 0–2)
BILIRUB SERPL-MCNC: 0.4 MG/DL (ref 0.1–1)
BUN SERPL-MCNC: 20 MG/DL (ref 5–21)
BUN/CREAT SERPL: 27 (ref 7–25)
CALCIUM SERPL-MCNC: 9.6 MG/DL (ref 8.4–10.4)
CHLORIDE SERPL-SCNC: 103 MMOL/L (ref 98–110)
CO2 SERPL-SCNC: 26 MMOL/L (ref 24–31)
CREAT SERPL-MCNC: 0.74 MG/DL (ref 0.5–1.4)
EOSINOPHIL # BLD AUTO: 0.1 10*3/MM3 (ref 0–0.7)
EOSINOPHIL NFR BLD AUTO: 1.9 % (ref 0–4)
ERYTHROCYTE [DISTWIDTH] IN BLOOD BY AUTOMATED COUNT: 14.1 % (ref 12–15)
GLOBULIN SER CALC-MCNC: 2.9 GM/DL
GLUCOSE SERPL-MCNC: 88 MG/DL (ref 70–100)
HCT VFR BLD AUTO: 46.5 % (ref 37–47)
HGB BLD-MCNC: 15 G/DL (ref 12–16)
IMM GRANULOCYTES # BLD: 0.01 10*3/MM3 (ref 0–0.03)
IMM GRANULOCYTES NFR BLD: 0.2 % (ref 0–5)
LYMPHOCYTES # BLD AUTO: 1.35 10*3/MM3 (ref 0.72–4.86)
LYMPHOCYTES NFR BLD AUTO: 25.5 % (ref 15–45)
MCH RBC QN AUTO: 28.5 PG (ref 28–32)
MCHC RBC AUTO-ENTMCNC: 32.3 G/DL (ref 33–36)
MCV RBC AUTO: 88.4 FL (ref 82–98)
MONOCYTES # BLD AUTO: 0.97 10*3/MM3 (ref 0.19–1.3)
MONOCYTES NFR BLD AUTO: 18.3 % (ref 4–12)
NEUTROPHILS # BLD AUTO: 2.84 10*3/MM3 (ref 1.87–8.4)
NEUTROPHILS NFR BLD AUTO: 53.5 % (ref 39–78)
PLATELET # BLD AUTO: 199 10*3/MM3 (ref 130–400)
POTASSIUM SERPL-SCNC: 4.7 MMOL/L (ref 3.5–5.3)
PROT SERPL-MCNC: 6.9 G/DL (ref 6.3–8.7)
RBC # BLD AUTO: 5.26 10*6/MM3 (ref 4.2–5.4)
SODIUM SERPL-SCNC: 139 MMOL/L (ref 135–145)
TSH SERPL DL<=0.005 MIU/L-ACNC: 1.3 MIU/ML (ref 0.47–4.68)
WBC # BLD AUTO: 5.3 10*3/MM3 (ref 4.8–10.8)

## 2017-03-03 ENCOUNTER — APPOINTMENT (OUTPATIENT)
Dept: MRI IMAGING | Facility: HOSPITAL | Age: 77
End: 2017-03-03

## 2017-03-03 ENCOUNTER — APPOINTMENT (OUTPATIENT)
Dept: GENERAL RADIOLOGY | Facility: HOSPITAL | Age: 77
End: 2017-03-03

## 2017-03-03 ENCOUNTER — APPOINTMENT (OUTPATIENT)
Dept: CT IMAGING | Facility: HOSPITAL | Age: 77
End: 2017-03-03

## 2017-03-03 ENCOUNTER — HOSPITAL ENCOUNTER (EMERGENCY)
Facility: HOSPITAL | Age: 77
Discharge: HOME | End: 2017-03-03
Admitting: EMERGENCY MEDICINE

## 2017-03-03 ENCOUNTER — HOSPITAL ENCOUNTER (OUTPATIENT)
Dept: HOSPITAL 58 - AMBL | Age: 77
End: 2017-03-03
Attending: INTERNAL MEDICINE

## 2017-03-03 VITALS
BODY MASS INDEX: 31.8 KG/M2 | OXYGEN SATURATION: 95 % | SYSTOLIC BLOOD PRESSURE: 147 MMHG | HEART RATE: 65 BPM | DIASTOLIC BLOOD PRESSURE: 76 MMHG | HEIGHT: 60 IN | RESPIRATION RATE: 20 BRPM | WEIGHT: 162 LBS

## 2017-03-03 VITALS — BODY MASS INDEX: 31.6 KG/M2

## 2017-03-03 DIAGNOSIS — S00.81XA: ICD-10-CM
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
## 2017-03-03 DIAGNOSIS — T07.XXXA MULTIPLE CONTUSIONS: Primary | ICD-10-CM

## 2017-03-03 DIAGNOSIS — S86.912A KNEE STRAIN, LEFT, INITIAL ENCOUNTER: ICD-10-CM

## 2017-03-03 DIAGNOSIS — R53.1: ICD-10-CM

## 2017-03-03 DIAGNOSIS — S09.90XA HEAD INJURY, INITIAL ENCOUNTER: ICD-10-CM

## 2017-03-03 DIAGNOSIS — R25.1: ICD-10-CM

## 2017-03-03 DIAGNOSIS — M25.562: Primary | ICD-10-CM

## 2017-03-03 LAB
BACTERIA UR QL AUTO: ABNORMAL /HPF
BILIRUB UR QL STRIP: NEGATIVE
CLARITY UR: CLEAR
COLOR UR: YELLOW
GLUCOSE UR STRIP-MCNC: NEGATIVE MG/DL
HGB UR QL STRIP.AUTO: ABNORMAL
HYALINE CASTS UR QL AUTO: ABNORMAL /LPF
KETONES UR QL STRIP: NEGATIVE
LEUKOCYTE ESTERASE UR QL STRIP.AUTO: ABNORMAL
NITRITE UR QL STRIP: NEGATIVE
PH UR STRIP.AUTO: 8 [PH] (ref 5–8)
PROT UR QL STRIP: ABNORMAL
RBC # UR: ABNORMAL /HPF
REF LAB TEST METHOD: ABNORMAL
SP GR UR STRIP: 1.01 (ref 1–1.03)
SQUAMOUS #/AREA URNS HPF: ABNORMAL /HPF
UROBILINOGEN UR QL STRIP: ABNORMAL
WBC UR QL AUTO: ABNORMAL /HPF

## 2017-03-03 PROCEDURE — 87086 URINE CULTURE/COLONY COUNT: CPT | Performed by: PHYSICIAN ASSISTANT

## 2017-03-03 PROCEDURE — 72192 CT PELVIS W/O DYE: CPT

## 2017-03-03 PROCEDURE — 73562 X-RAY EXAM OF KNEE 3: CPT

## 2017-03-03 PROCEDURE — 81001 URINALYSIS AUTO W/SCOPE: CPT | Performed by: PHYSICIAN ASSISTANT

## 2017-03-03 PROCEDURE — 71010 HC CHEST PA OR AP: CPT

## 2017-03-03 PROCEDURE — 73503 X-RAY EXAM HIP UNI 4/> VIEWS: CPT

## 2017-03-03 PROCEDURE — 99283 EMERGENCY DEPT VISIT LOW MDM: CPT

## 2017-03-03 PROCEDURE — 70450 CT HEAD/BRAIN W/O DYE: CPT

## 2017-03-03 RX ORDER — DIGOXIN 125 MCG
TABLET ORAL
Qty: 15 TABLET | Refills: 5 | Status: SHIPPED | OUTPATIENT
Start: 2017-03-03 | End: 2018-01-01 | Stop reason: SDUPTHER

## 2017-03-03 NOTE — ED NOTES
PATIENT STATES SHE GOT DIZZY THIS MORNING AND FELL. SHE C/O LEFT KNEE PAIN AND HAS AN ABRASION TO HER LEFT KNEE AND ON HER LEFT FOREHEAD. PATIENT DENIES LOSS OF CONSCIOUSNESS.      Melba Goncalves RN  03/03/17 0884

## 2017-03-03 NOTE — ED PROVIDER NOTES
Subjective   Patient is a 76 y.o. female presenting with fall.   Fall   Associated symptoms: chest pain    Associated symptoms: no neck pain      Patient is a 76-year-old  female chief complaint of knee pain, hip pain and head injury status post fall.  She resides at an assisted living facility.  She reports that she was taking her medications and needed to adjust. When she did, she rolled over too far, and fell on her left side.  She impacted her left arm and leg before she hit her head.  She denies any loss of consciousness.  She does feel mild head discomfort.  She denies any neck pain.  She did hit her chest wall as well.  She reports a history of left-sided rib fractures in the past.  She reports this feels similar.  She denies pain in her left arm.  However, she does have mild discomfort in left hip and moderate pain in her left knee.  She was able to get up with assistance and ambulate to the restroom.  She complained of mild discomfort.    Review of Systems   Constitutional: Negative.    HENT: Negative.    Respiratory: Negative.    Cardiovascular: Positive for chest pain. Negative for leg swelling.   Genitourinary: Negative.    Musculoskeletal: Positive for gait problem. Negative for neck pain and neck stiffness.   Skin: Negative.        Past Medical History   Diagnosis Date   • Atrial fibrillation    • Chest pain    • Hypertension    • Seizures    • Stroke    • Syncope and collapse    • Tremor    • Ventricular tachycardia (paroxysmal)        Allergies   Allergen Reactions   • Contrast Dye Hives   • Nexium [Esomeprazole]        Past Surgical History   Procedure Laterality Date   • Cataract extraction     • Hysterectomy         Family History   Problem Relation Age of Onset   • Heart disease Mother    • Stroke Mother    • Heart failure Mother    • Cancer Father    • No Known Problems Sister    • No Known Problems Brother    • No Known Problems Sister        Social History     Social History   •  Marital status: Single     Spouse name: N/A   • Number of children: N/A   • Years of education: N/A     Social History Main Topics   • Smoking status: Never Smoker   • Smokeless tobacco: Never Used   • Alcohol use No   • Drug use: No   • Sexual activity: No     Other Topics Concern   • None     Social History Narrative       Prior to Admission medications    Medication Sig Start Date End Date Taking? Authorizing Provider   acetaminophen (TYLENOL) 325 MG tablet Take 650 mg by mouth every night.    Historical Provider, MD   aspirin 81 MG EC tablet Take 81 mg by mouth daily.    Historical Provider, MD   atorvastatin (LIPITOR) 40 MG tablet TAKE ONE TABLET NIGHTLY 1/30/17   Tre Sevilla MD   calcium carbonate-vitamin d (CALCIUM 600+D) 600-400 MG-UNIT per tablet Take 1 tablet by mouth 2 (two) times a day.    Historical Provider, MD   conjugated estrogens (PREMARIN) 0.625 MG/GM vaginal cream Insert 30 g into the vagina 3 (three) times a week.    Historical Provider, MD   digoxin (LANOXIN) 125 MCG tablet TAKE 1/2 TABLET DAILY 2/13/17   Tre Sevilla MD   levETIRAcetam (KEPPRA) 1000 MG tablet Take 1 tablet by mouth 2 (Two) Times a Day. 12/6/16   CHRISTIANE Gaxiola   lisinopril (PRINIVIL,ZESTRIL) 2.5 MG tablet TAKE ONE TABLET DAILY 1/30/17   Tre Sevilla MD   metoprolol succinate XL (TOPROL-XL) 25 MG 24 hr tablet TAKE ONE TABLET DAILY 12/27/16   Tre Sevilla MD   Multiple Vitamins-Minerals (EYE VITAMINS PO) Take 1 tablet by mouth daily.    Historical Provider, MD   Multiple Vitamins-Minerals (MULTIVITAMIN ADULT PO) Take 1 tablet by mouth daily.    Historical Provider, MD   omeprazole (PriLOSEC) 20 MG capsule Take 20 mg by mouth daily.    Historical Provider, MD   polyethylene glycol (MIRALAX) packet Take 17 g by mouth daily.    Historical Provider, MD   potassium chloride (K-DUR,KLOR-CON) 20 MEQ CR tablet TAKE ONE TABLET TWICE DAILY 1/30/17   Tre Sevilla MD   primidone (MYSOLINE) 50  "MG tablet Take 2 tablets by mouth 3 (Three) Times a Day. 10/18/16   CHRISTIANE Gaxiola   propafenone (RHTHYMOL) 150 MG tablet Take 150 mg by mouth 3 (three) times a day.    Historical Provider, MD       Medications - No data to display    Visit Vitals   • /89   • Pulse 78   • Resp 20   • Ht 60\" (152.4 cm)   • Wt 162 lb (73.5 kg)   • SpO2 98%   • BMI 31.64 kg/m2         Objective   Physical Exam   Constitutional: She is oriented to person, place, and time. She appears well-developed and well-nourished. No distress.   HENT:   Head: Normocephalic and atraumatic. Head is without raccoon's eyes, without Lawler's sign, without right periorbital erythema and without left periorbital erythema.   Right Ear: External ear normal.   Left Ear: External ear normal.   Nose: Nose normal.   Mouth/Throat: Oropharynx is clear and moist.   I did not see obvious hemotympanum.  However, it is partially obstruction due to cerumen.'      Small superficial abrasion to left upper forehead.    Eyes: Conjunctivae and EOM are normal. Pupils are equal, round, and reactive to light.   Neck: Normal range of motion. Neck supple. No tracheal deviation present.   Cardiovascular: Normal rate, regular rhythm, normal heart sounds and intact distal pulses.    No murmur heard.  Pulmonary/Chest: Effort normal and breath sounds normal.   Patient has mild tenderness when palpating on her left lateral lower chest wall.   Abdominal: Soft. Bowel sounds are normal. She exhibits no distension and no mass. There is no tenderness. There is no rebound and no guarding.   Musculoskeletal: She exhibits edema and tenderness.        Left hip: She exhibits decreased range of motion, decreased strength, tenderness and bony tenderness.   Patient has mild left hip tenderness palpation to the lateral aspect.  She keeps it externally rotated and shortened.    The patient does have generalized tenderness palpation on her left knee with mild edema.  There is no an " ecchymosis present.  Patient has worse pain with flexion as well.  She is nontender to palpate on her ankle or foot.  She is nontender to palpate on her right lower extremity.  She is nontender palpation on her upper extremities.    Neurological: She is alert and oriented to person, place, and time. She has normal reflexes. No cranial nerve deficit or sensory deficit. GCS eye subscore is 4. GCS verbal subscore is 5. GCS motor subscore is 6.   Mild upper extremity tremors   Skin: Skin is warm and dry. She is not diaphoretic.   Psychiatric: She has a normal mood and affect. Her behavior is normal. Judgment and thought content normal.   Nursing note and vitals reviewed.      Procedures         Lab Results (last 24 hours)     Procedure Component Value Units Date/Time    Urinalysis With / Culture If Indicated [[id number]]  (Abnormal) Collected:  03/03/17 0824    Specimen:  Urine from Urine, Clean Catch Updated:  03/03/17 0851     Color, UA Yellow      Appearance, UA Clear      pH, UA 8.0      Specific Gravity, UA 1.013      Glucose, UA Negative      Ketones, UA Negative      Bilirubin, UA Negative      Blood, UA Trace (A)      Protein, UA 30 mg/dL (1+) (A)      Leuk Esterase, UA Small (1+) (A)      Nitrite, UA Negative      Urobilinogen, UA 0.2 E.U./dL     Urine Culture [[id number]] Collected:  03/03/17 0824    Specimen:  Urine from Urine, Clean Catch Updated:  03/03/17 0831    Urinalysis, Microscopic Only [[id number]]  (Abnormal) Collected:  03/03/17 0824    Specimen:  Urine from Urine, Clean Catch Updated:  03/03/17 0851     RBC, UA 3-5 (A) /HPF      WBC, UA 13-20 (A) /HPF      Bacteria, UA None Seen /HPF      Squamous Epithelial Cells, UA None Seen /HPF      Hyaline Casts, UA None Seen /LPF      Methodology Automated Microscopy           Xr Knee 3 View Left    Result Date: 3/3/2017  Narrative: Exam:   XR KNEE 3 VW LEFT-   Date:  03/03/2017  History:  Female, age  76 years;fall  COMPARISON:  None.  Findings :  Severely  demineralization of the skeleton limits bony details. Within those limitations: There is no acute displaced fracture. No dislocation. Degenerative changes, with joint space narrowing medially.      Impression: Impression: No acute displaced fracture. No dislocation.  This report was finalized on  by Dr. Amanda Villafana MD.    Ct Head Without Contrast    Result Date: 3/3/2017  Narrative: EXAM: CT OF THE HEAD WITHOUT IV CONTRAST 03/03/2017  COMPARISON: Head CT dated 06/09/2016  INDICATION: Female, 76 years-old. Fall  PROCEDURE: Non contrast enhanced head CT was performed.  Radiation dose equals  mGy-cm.  Automated exposure control dose reduction technique was implemented.  FINDINGS: Evidence of prior right MCA infarct with encephalomalacia and gliosis redemonstrated. There is no acute intracranial hemorrhage. No midline shift. No acute hydrocephalus. The gray-white differentiation appears otherwise preserved. No suspicious extra-axial fluid collection. The visualized portions of the paranasal sinuses and mastoid air cells are unremarkable.      Impression:  No acute intracranial abnormality. This report was finalized on  by Dr. Amanda Villafana MD.    Ct Pelvis Without Contrast    Result Date: 3/3/2017  Narrative: EXAMINATION: CT PELVIS WO CONTRAST-  3/3/2017 11:11 CST  DOSE LENGTH PRODUCT: 433 mGy cm  COMPARISON: Radiograph from today  HISTORY: Fall, LEFT hip pain  FINDINGS: There is osteopenia. There is no definite evidence of a displaced fracture in the pelvis or LEFT hip. Mild degenerative change in the lower lumbar spine. No definite sacral fracture. Degenerative disc disease in the lower lumbar spine.  Surrounding soft tissue structures demonstrate contusion within the subcutaneous fat over the LEFT hip. There may be a small hematoma in the subcutaneous fat of this region. Some fatty infiltration of the anterior fibers of gluteus minimus and medius may suggest chronic gluteal tears on the LEFT. No acute  findings in the abdomen or pelvis. Nonobstructing nephrolith at the inferior pole of the RIGHT kidney. Numerous exophytic lesions project off the visualized portion of the LEFT kidney. These are indeterminate. There is atherosclerosis in the abdominopelvic vasculature. There is a small fat-containing ventral abdominal wall hernia just above the pubic symphysis.      Impression: 1. Osteopenia but no definite evidence of displaced fracture deformity. If there is high clinical concern for radiographically occult pelvic fracture or LEFT hip fracture, MRI could be obtained. 2. Degenerative changes in the lumbar spine. 3. Exophytic lesions project off the LEFT kidney. These may be cysts but are indeterminate on this examination. Nonemergent ultrasound of the kidneys is recommended. 4. Nonobstructing nephrolith at the inferior pole of the RIGHT kidney. 5. Small ventral abdominal wall hernia just above the pubic symphysis, containing mesenteric fat. 6. Findings suggestive of chronic gluteal tendon tears on the LEFT, anteriorly. 7. Contusion/hematoma in the subcutaneous fat overlying the LEFT hip.  This report was finalized on 03/03/2017 12:04 by Dr. Bogdan Mckenzie MD.    Xr Chest 1 View    Result Date: 3/3/2017  Narrative: Exam:   XR CHEST 1 VW-   Date:  03/03/2017  History:  Female, age  76 years;fall  COMPARISON:  None.  Findings :  The heart and mediastinum are normal in size. Lungs are without focal infiltrate, mass or effusions.  The bones show no acute pathology.       Impression: Impression:  No acute cardiopulmonary disease.  This report was finalized on  by Dr. Amanda Villafana MD.    Xr Hip With Or Without Pelvis 4 View Left    Result Date: 3/3/2017  Narrative: Exam:   XR HIP W OR WO PELVIS 4 VIEW LEFT-   Date:  03/03/2017  History:  Female, age  76 years;fall  COMPARISON:  None.  Findings :  There is no acute displaced fracture. No dislocation. Moderate degenerative changes of the hip joints bilaterally.       Impression: Impression:  No acute displaced fracture. No dislocation. This report was finalized on  by Dr. Amanda Villafana MD.      ED Course  ED Course        I have offered pain medication, but the patient declines.     Patient has been reassessed. She is nontender to palpate on the left hip. She continues to have mild pain in her left knee.  She has no pain in her left hip upon internal and external rotation and flexion and extension of her LLE.  She was able to ambulate without any pain in her left hip.         MDM    Final diagnoses:   Multiple contusions   Knee strain, left, initial encounter   Head injury, initial encounter          u-OLIVIER Zamora  03/03/17 1123       OLIVIER Garcia  03/03/17 1328

## 2017-03-05 LAB
BACTERIA SPEC AEROBE CULT: ABNORMAL
BACTERIA SPEC AEROBE CULT: ABNORMAL

## 2017-03-07 RX ORDER — OMEPRAZOLE 20 MG/1
CAPSULE, DELAYED RELEASE ORAL
Qty: 30 CAPSULE | Refills: 5 | Status: ON HOLD | OUTPATIENT
Start: 2017-03-07 | End: 2017-01-01 | Stop reason: SDUPTHER

## 2017-04-14 RX ORDER — METOPROLOL SUCCINATE 25 MG/1
TABLET, EXTENDED RELEASE ORAL
Qty: 90 TABLET | Refills: 1 | Status: SHIPPED | OUTPATIENT
Start: 2017-04-14 | End: 2017-01-01 | Stop reason: SDUPTHER

## 2017-04-14 RX ORDER — PROPAFENONE HYDROCHLORIDE 150 MG/1
TABLET, COATED ORAL
Qty: 90 TABLET | Refills: 5 | Status: SHIPPED | OUTPATIENT
Start: 2017-04-14 | End: 2017-01-01 | Stop reason: SDUPTHER

## 2017-04-14 NOTE — TELEPHONE ENCOUNTER
"Vm \"she need several refill\" called and advised to call pharmacy and request refills as we have refilled 2 today already and we will take care of them  "

## 2017-04-20 RX ORDER — PRIMIDONE 50 MG/1
100 TABLET ORAL 3 TIMES DAILY
Qty: 180 TABLET | Refills: 5 | Status: SHIPPED | OUTPATIENT
Start: 2017-04-20 | End: 2017-01-01 | Stop reason: SDUPTHER

## 2017-04-30 ENCOUNTER — HOSPITAL ENCOUNTER (OUTPATIENT)
Dept: HOSPITAL 58 - AMBL | Age: 77
Discharge: HOME | End: 2017-04-30
Attending: INTERNAL MEDICINE

## 2017-04-30 VITALS — BODY MASS INDEX: 31.6 KG/M2

## 2017-04-30 DIAGNOSIS — Z04.3: Primary | ICD-10-CM
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
## 2017-05-01 ENCOUNTER — TELEPHONE (OUTPATIENT)
Dept: FAMILY MEDICINE CLINIC | Facility: CLINIC | Age: 77
End: 2017-05-01
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
## 2017-05-01 DIAGNOSIS — R26.9 GAIT DIFFICULTY: Primary | ICD-10-CM

## 2017-05-02 RX ORDER — POTASSIUM CHLORIDE 20 MEQ/1
TABLET, EXTENDED RELEASE ORAL
Qty: 180 TABLET | Refills: 1 | Status: SHIPPED | OUTPATIENT
Start: 2017-05-02 | End: 2018-01-01 | Stop reason: SDUPTHER

## 2017-05-11 ENCOUNTER — TELEPHONE (OUTPATIENT)
Dept: FAMILY MEDICINE CLINIC | Facility: CLINIC | Age: 77
End: 2017-05-11

## 2017-05-11 RX ORDER — FLUTICASONE PROPIONATE 50 MCG
2 SPRAY, SUSPENSION (ML) NASAL DAILY
Qty: 1 EACH | Refills: 0 | Status: SHIPPED | OUTPATIENT
Start: 2017-05-11 | End: 2017-06-01 | Stop reason: SDUPTHER

## 2017-06-01 RX ORDER — LEVETIRACETAM 1000 MG/1
1000 TABLET ORAL 2 TIMES DAILY
Qty: 60 TABLET | Refills: 5 | Status: SHIPPED | OUTPATIENT
Start: 2017-06-01 | End: 2017-01-01 | Stop reason: SDUPTHER

## 2017-06-01 RX ORDER — FLUTICASONE PROPIONATE 50 MCG
SPRAY, SUSPENSION (ML) NASAL
Qty: 16 ML | Refills: 5 | Status: SHIPPED | OUTPATIENT
Start: 2017-06-01

## 2017-06-08 ENCOUNTER — HOSPITAL ENCOUNTER (OUTPATIENT)
Dept: HOSPITAL 58 - AMBL | Age: 77
Discharge: HOME | End: 2017-06-08
Attending: INTERNAL MEDICINE
Payer: COMMERCIAL

## 2017-06-08 VITALS — BODY MASS INDEX: 31.6 KG/M2
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
## 2017-06-08 DIAGNOSIS — R29.6: ICD-10-CM

## 2017-06-08 DIAGNOSIS — M19.90: Primary | ICD-10-CM

## 2017-07-09 NOTE — ED.PDOC
General


ED Provider: 


Dr. MARGARITO GUILLEN JR





Chief Complaint: Abdominal Pain


Stated Complaint: ABD AND BACK PAIN STARTED AFTER DINNER.[End]7/8 98.9 58 20 96

% 134/66 9/10 ABD PAIN WITH NAUSEA. tums last night with some relief


Time Seen by Physician: 14:55


Mode of Arrival: Walk-In


Information Source: Patient, Family


Exam Limitations: No limitations


Primary Care Provider: 


LAUREN HONEYCUTT





Nursing and Triage Documentation Reviewed and Agree: No





Review of Systems





- Review Of Systems


Constitutional: Reports: Malaise


Eyes: Reports: No symptoms


Ears, Nose, Mouth, Throat: Reports: No symptoms


Respiratory: Reports: No symptoms


Cardiac: Reports: No symptoms


GI: Reports: Abdominal pain (symptoms after rib sndwich wihtout BBQ sauce, 

abdomnal pain per daughter patient only notes right flank pain and tenderness)


: Reports: No symptoms


Musculoskeletal: Reports: Back pain


Skin: Reports: No symptoms


Neurological: Reports: No symptoms


Endocrine: Reports: No symptoms


Hematologic/Lymphatic: Reports: No symptoms


All Other Systems: Other





Past Medical History





- Past Medical History


Endocrine: Reports: DM 2, Hypothyroid


Cardiovascular: Reports: Hypertension


Respiratory: Reports: None


Hematological: Reports: None


Gastrointestinal: Reports: None


Genitourinary: Reports: None


Neuro/Psych: Reports: CVA (left sided weakness post cva), Seizure, Anxiety, 

Depression


Musculoskeletal: Reports: None


Cancer: Reports: None


Last Menstrual Period: 1996





- Surgical History


General Surgical History: Reports: Hysterectomy, Other (thyroid surgery )





- Family History


Family History: Reports: Unknown





- Social History


Smoking Status: Never smoker


Hx Substance Use: No


Alcohol Screening: None





Physical Exam





- Physical Exam


Appearance: Well-appearing


Pain Distress: Moderate


Eyes: PRATIBHA, EOMI, Conjunctiva clear


ENT: Ears normal, Nose normal, Oropharynx normal


Neck: Supple


Respiratory: Airway patent, Breath sounds clear, Breath sounds equal, 

Respirations nonlabored


Cardiovascular: Pulses normal, No rub, Irregular rhythm, Murmur


GI/: Soft, Nontender, No masses, Bowel sounds normal, No Organomegaly


Musculoskeletal: Limited ROM (left weakness)


Skin: Warm, Dry, Normal color


Neurological: Sensation intact, Motor intact, Reflexes intact, Cranial nerves 

intact, Alert, Oriented


Psychiatric: Affect appropriate, Mood appropriate





Critical Care Note





- Critical Care Note


Total Time (mins): 0





Course





- Course


Hematology/Chemistry: 


 07/09/17 15:15





 07/09/17 15:15


Orders, Labs, Meds: 


Lab Review











  07/09/17 07/09/17





  15:15 15:25


 


WBC  7.63 


 


RBC  5.13 


 


Hgb  14.7 


 


Hct  43.6 


 


MCV  85.0 


 


MCH  28.7 


 


MCHC  33.7 


 


RDW Coeff of Елена  13.2 


 


Plt Count  213 


 


Immature Gran % (Auto)  0.1 


 


Neut % (Auto)  63.6 


 


Lymph % (Auto)  22.3 


 


Mono % (Auto)  11.1 H 


 


Eos % (Auto)  2.2 


 


Baso % (Auto)  0.7 


 


Immature Gran # (Auto)  0.0 


 


Neut #  4.9 


 


Lymph #  1.7 


 


Mono #  0.9 


 


Eos #  0.2 


 


Baso #  0.1 


 


Sodium  140 


 


Potassium  4.3 


 


Chloride  103 


 


Carbon Dioxide  25 


 


Anion Gap  16.3 


 


BUN  20 H 


 


Creatinine  0.82 


 


Estimated GFR (MDRD)  68.00 


 


BUN/Creatinine Ratio  24.39 


 


Glucose  108 


 


Calcium  9.4 


 


Total Bilirubin  0.23 


 


AST  26 


 


ALT  39 


 


Alkaline Phosphatase  65 


 


Total Protein  6.8 


 


Albumin  3.5 


 


Globulin  3.3 


 


Albumin/Globulin Ratio  1.06 


 


Amylase  43 


 


Lipase  19 


 


Urine Color   Yellow


 


Urine Clarity   Clear


 


Urine pH   7.0


 


Ur Specific Gravity   1.015


 


Urine Protein   Negative


 


Urine Glucose (UA)   Negative


 


Urine Ketones   Negative


 


Urine Blood   Trace-intact


 


Urine Nitrite   Negative


 


Urine Bilirubin   Negative


 


Urine Urobilinogen   0.2


 


Ur Leukocyte Esterase   1+


 


Urine Microscopic RBC   0-2


 


Urine Microscopic WBC   0-2


 


Ur Squamous Epith Cells   0-2


 


Ur Renal Epithelial Cell   0-2


 


Digoxin  Pending 


 


H. pylori IgG Antibody  Negative 








Orders











 Category Date Time Status


 


 ED IV/MEDIPORT/POWERPORT .ONCE EMERGENCY  07/09/17 14:57 Active


 


 AMYLASE Stat LAB  07/09/17 15:15 Results


 


 CBC W/ AUTO DIFF Stat LAB  07/09/17 15:15 Completed


 


 COMPREHENSIVE METABOLIC PANEL Stat LAB  07/09/17 15:15 Results


 


 DIGOXIN Stat LAB  07/09/17 15:15 Results


 


 H. PYLORI SCREEN Stat LAB  07/09/17 15:15 Completed


 


 LIPASE Stat LAB  07/09/17 15:15 Results


 


 PROCALCITONIN Stat LAB  07/09/17 15:15 Received


 


 URINALYSIS C & S IF INDICATED Stat LAB  07/09/17 15:25 Completed


 


 0.9 % Sodium Chloride [Saline Flush] MEDS  07/09/17 14:57 Ordered





 1 syr IVF PRN PRN   


 


 Hydrocodone Bit/Acetaminophen [[place] 5-325] MEDS  07/09/17 15:13 Discontinued





 1 tab PO ONCE STA   


 


 CT ABDOMEN/PELVIS WO CONTRAST Stat RADS  07/09/17 14:57 Completed








Medications











Generic Name Dose Route Start Last Admin





  Trade Name Gonsalo  PRN Reason Stop Dose Admin


 


Sodium Chloride  1 syr  07/09/17 14:57  





  Saline Flush  IVF   





  PRN PRN   





  To flush IV   














Discontinued Medications














Generic Name Dose Route Start Last Admin





  Trade Name Freamy  PRN Reason Stop Dose Admin


 


Acetaminophen/Hydrocodone Bitart  1 tab  07/09/17 15:13  07/09/17 15:48





  [place] 5-325  PO  07/09/17 15:14  1 tab





  ONCE STA   Administration











Vital Signs: 


 











  Temp Pulse Resp BP Pulse Ox


 


 07/09/17 14:30  98.9 F  58 L  20  134/66  96














Departure





- Departure


Time of Disposition: 15:49


Disposition: HOME SELF-CARE


Discharge Problem: 


 Abdominal pain





Instructions:  Abdominal Pain (ED)


Condition: Good


Pt referred to PMD for follow-up: Yes


Additional Instructions: 


Tylenol twice a day for discomfort may repeat up to four times a day total as 

needed





follow up with PMD this week discuss abdominal pain and back pain





consider referral for new slight T12 fracture


consider further treatment of constipation





Miralax 1/2 capful in large glass of juice daily for four days


anticipate improvement of pain with relief of constipation


no evidence of cholecystitis possible urine infection- recheck culture in two 

days


agree with physical therapy for functional improvement


Prescriptions: 


Acetaminophen [Tylenol] 500 mg PO Q6H PRN #30 tablet


 PRN Reason: Abdominal Pain


Polyethylene Glycol 3350 [Miralax] 9 gm PO DAILY PRN #20 powd.pack


 PRN Reason: Constipation


Allergies/Adverse Reactions: 


Allergies





Iodine and Iodide Containing Produc Adverse Reaction (Verified 11/13/16 14:56)


 








Home Medications: 


Ambulatory Orders





Acetaminophen [Mapap] 325 mg PO Q4HR PRN 12/03/15 


Aspirin [Aspirin Chewable] 81 mg PO DAILYWM 12/03/15 


Atorvastatin Calcium 40 mg PO BEDTIME 12/03/15 


Calcium Carbonate/Vitamin D3 [Calcium 500 + D Tablet] 1 each PO BID 12/03/15 


Levetiracetam [Keppra] 1,000 mg PO BID 12/03/15 


Lisinopril [Zestril] 2.5 mg PO DAILY 12/03/15 


Metoprolol Succinate [Toprol Xl] 25 mg PO DAILY 12/03/15 


Omeprazole [Prilosec] 20 mg PO QDAC 12/03/15 


Polyethylene Glycol 3350 [Miralax] 17 gm PO DAILY 12/03/15 


Potassium Chloride [K-Dur] 20 meq PO BID 12/03/15 


Primidone [Mysoline] 50 mg PO TID 12/03/15 


Propafenone HCl [Rythmol] 150 mg PO TID 12/03/15 


Vit C/Vit E/Lutein/Min/Omega-3 [Ocuvite Softgel] 1 each PO DAILY 12/03/15 


Bisacodyl [Dulcolax] 10 mg RC ONCE PRN 11/13/16 


Digoxin [Lanoxin] 125 mcg PO DAILY 11/13/16 


Estrogens, Conjugated [Premarin Vaginal Cream] 1 applic VG DAILY PRN 11/13/16 


Acetaminophen [Tylenol] 500 mg PO Q6H PRN #30 tablet 07/09/17 


Polyethylene Glycol 3350 [Miralax] 9 gm PO DAILY PRN #20 powd.pack 07/09/17

## 2017-07-09 NOTE — CT
EXAM:  CT abdomen and pelvis without contrast. 

  

HISTORY: Abdominal pain. 

  

TECHNIQUE:  Multi-slice transaxial helical CT. Coronal and sagittal reformatons were performed. 

  

COMPARISON: CT chest 11/13/2016.  CT abdomen 06/02/2008. 

  

FINDINGS: 

  

Coronary artery calcifications are present.  The heart is on the upper limits of normal in size. Mas
s-like opacity in the left lung base does not appear significantly changed since 11/13/2016 which ap
pears linear and partly pleural-based measuring 4.9 x 1.8 cm.  The otherwise the lung bases appear c
lear.  The old inferior left lower rib fractures are not significantly changed. 

  

Evaluation of the solid organs is limited without IV contrast.  The spleen is normal in size.  The p
ancreas appears significantly fatty infiltrated.  No intrahepatic biliary ductal dilation is seen.  
Multiple tiny bilateral nonobstructing renal calculi are present which measure up to 4 mm in the rig
ht kidney.  Multiple left-sided renal exophytic hypodense lesions are seen which appears slightly la
rger than previously seen in 2008.  Many of these structures measure a simple water density.  Some o
f the densities measure higher than simple water.  Numerous bilateral other tiny hypodense and hyper
dense renal structures are present which are too small to actually characterize. Bilateral adrenal g
lands appear unremarkable.  The gallbladder appears unremarkable per 

  

The bowel is not dilated. The urinary bladder appears unremarkable.  The uterus has been removed.  P
rominent stool is seen throughout the colon.  The appendix is normal in size.  Calcified plaques are
 present within the abdominal aorta and its major branch vessels.  No evidence of retroperitoneal ad
enopathy is seen.  Bilateral hip joint space narrowing is present.  There is moderate to advanced mu
ltilevel degenerative changes of the lumbar spine.  Mild anterior compression fracture of T12 is pre
sent with cortical irregularity and sclerosis in the superior endplate. 

  

IMPRESSION: 

  

1.  No acute abdominal findings. 

2.  Left basilar pleural based mass-like opacity, not significantly changed since 2016.  Differentia
l includes pleural thickening or round atelectasis.  Recommend follow-up CT of the chest in 6 months
 to exclude enlarging mass.  Adjacent left lower posterior rib fractures are present. 

3.  Atherosclerosis, including coronary disease. 

4.  Bilateral nonobstructing renal calculi.  No evidence of hydronephrosis. 

5.  Multiple left-sided simple appearing renal cysts.  Some of the renal masses measure above 20 HU 
which are indeterminate.  Multiple additional sub-centimeter hypo and hyperdensities are seen in the
 bilateral kidneys too small to accurately characterize.  Recommend non-emergent CT or MRI of the ab
domen before and after administration of IV contrast for further evaluation. 

6.  Prominent stool.  Correlate with constipation. 

7.  Hysterectomy. 

8.  Mild anterior compression fracture of T12, which appears new since 11/13/2016.

## 2017-07-10 NOTE — TELEPHONE ENCOUNTER
Ana Lilia called and she took pt to ER over weekend for back and R sided pain ER told them it could be because of a slight bowel blockage but Ana Lilia thinks it could be gallbladder,states ER tested gallbladder and told them it was fine but Ana Lilia is not sure and wants to know Dr Sevilla opinion [phone]

## 2017-07-11 NOTE — PROGRESS NOTES
Subjective   Daphne Santos is a 76 y.o. female presenting with chief complaint of:   Chief Complaint   Patient presents with   • Abdominal Pain     R  [facility] ER follow up   • Back Pain     R [facility] ER follow up       History of Present Illness :  With sister.  Has an acute issue today: pain R back. Came 7.9.17 after eating riblet.  Pain was crampy.  CT abdomen neg; Urine culture stil neg.  Called today ? Gallbladder.       Other chronic problem/s to consider:   HTN.  The HTN has been present for years/it is chronic.  The HTN is assumed essential/without testing needed to look for other.  The HTN is controlled manifest by todays blood pressure and no home monitoring.   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 It is chronic.  The arrthymia is primarily a fib   The rhythm is not associated with syncope/near syncope, dizziness, or weakness. Medications being used help.  Anticoagulation: Requires anticoagulation with ASA 81 for DM; deferring other for a fib because of fall risks.  History CVA:  This happened 2015.  It was without bleed.  The neuro deficits have been L hemiplegia/ stable.    The following portions of the patient's history were reviewed and updated as appropriate: allergies, current medications, past family history, past medical history, past social history, past surgical history and problem list.  TCC migrated if needed/reviewed.      Current Outpatient Prescriptions:   •  acetaminophen (TYLENOL) 325 MG tablet, Take 650 mg by mouth every night., Disp: , Rfl:   •  aspirin 81 MG EC tablet, Take 81 mg by mouth daily., Disp: , Rfl:   •  atorvastatin (LIPITOR) 40 MG tablet, TAKE ONE TABLET NIGHTLY, Disp: 90 tablet, Rfl: 1  •  calcium carbonate-vitamin d (CALCIUM 600+D) 600-400 MG-UNIT per tablet, Take 1 tablet by mouth 2 (two) times a day., Disp: , Rfl:   •  conjugated estrogens (PREMARIN) 0.625 MG/GM vaginal cream, Insert 30 g into the vagina 3 (three) times a week., Disp: , Rfl:   •   digoxin (LANOXIN) 125 MCG tablet, TAKE 1/2 TABLET DAILY, Disp: 15 tablet, Rfl: 5  •  fluticasone (FLONASE) 50 MCG/ACT nasal spray, USE 2 SPRAYS IN EACH NOSTRIL DAILY, Disp: 16 mL, Rfl: 5  •  levETIRAcetam (KEPPRA) 1000 MG tablet, Take 1 tablet by mouth 2 (Two) Times a Day., Disp: 60 tablet, Rfl: 5  •  lisinopril (PRINIVIL,ZESTRIL) 2.5 MG tablet, TAKE ONE TABLET DAILY, Disp: 90 tablet, Rfl: 1  •  metoprolol succinate XL (TOPROL-XL) 25 MG 24 hr tablet, TAKE ONE TABLET DAILY, Disp: 90 tablet, Rfl: 1  •  Multiple Vitamins-Minerals (EYE VITAMINS PO), Take 1 tablet by mouth daily., Disp: , Rfl:   •  Multiple Vitamins-Minerals (MULTIVITAMIN ADULT PO), Take 1 tablet by mouth daily., Disp: , Rfl:   •  omeprazole (priLOSEC) 20 MG capsule, TAKE ONE CAPSULE DAILY, Disp: 30 capsule, Rfl: 5  •  polyethylene glycol (MIRALAX) packet, Take 17 g by mouth daily., Disp: , Rfl:   •  potassium chloride (K-DUR,KLOR-CON) 20 MEQ CR tablet, TAKE ONE TABLET TWICE DAILY, Disp: 180 tablet, Rfl: 1  •  primidone (MYSOLINE) 50 MG tablet, Take 2 tablets by mouth 3 (Three) Times a Day., Disp: 180 tablet, Rfl: 5  •  propafenone (RHTHYMOL) 150 MG tablet, TAKE ONE TABLET THREE TIMES DAILY, Disp: 90 tablet, Rfl: 5    Allergies   Allergen Reactions   • Contrast Dye Hives   • Nexium [Esomeprazole]        Review of Systems  GENERAL:  Inactive/slower with limits, speed, samni for age and L hemiplegia. Sleep is ok. No fever now.  ENDO:  No syncope, near or diaphoretic sweaty spells.  HEENT: No head injury or headache,  No vision change, No significant hearing loss.  Ears without pain/drainage.  No sore throat.  No significant nasal/sinus congestion/drainage. No epistaxis.  CHEST: No chest wall tenderness or mass. No cough, without wheeze, SOB; no hemoptysis.  CV: No chest pain, palpatations, ankle edema.  GI: No heartburn, dysphagia.  No other abdominal pain, diarrhea, constipation, rectal bleeding, or melena.    :  Voids without dysuria, or incontience to  completion.  ORTHO: No painful/swollen joints but various on /off sore.  No sore neck or back.  No acute neck or back pain without recent injury.   NEURO: No dizziness, weakness of extremities.  No numbness/parethesias.   PSYCH: No memory loss.  Mood good; not that anxious, depressed but/and not suicidal.  Tolerated stress.     Results for orders placed or performed during the hospital encounter of 03/03/17   Urine Culture   Result Value Ref Range    Urine Culture <10,000 CFU/mL Gram Negative Bacilli (A)     Urine Culture <10,000 CFU/mL Mixed Gram Positive Laura (A)    Urinalysis With / Culture If Indicated   Result Value Ref Range    Color, UA Yellow Yellow, Straw    Appearance, UA Clear Clear    pH, UA 8.0 5.0 - 8.0    Specific Gravity, UA 1.013 1.005 - 1.030    Glucose, UA Negative Negative    Ketones, UA Negative Negative    Bilirubin, UA Negative Negative    Blood, UA Trace (A) Negative    Protein, UA 30 mg/dL (1+) (A) Negative    Leuk Esterase, UA Small (1+) (A) Negative    Nitrite, UA Negative Negative    Urobilinogen, UA 0.2 E.U./dL 0.2 - 1.0 E.U./dL   Urinalysis, Microscopic Only   Result Value Ref Range    RBC, UA 3-5 (A) None Seen /HPF    WBC, UA 13-20 (A) None Seen /HPF    Bacteria, UA None Seen None Seen /HPF    Squamous Epithelial Cells, UA None Seen None Seen, 0-2 /HPF    Hyaline Casts, UA None Seen None Seen /LPF    Methodology Automated Microscopy        Lab Results   Component Value Date    HGBA1C 5.3 05/05/2016    HGBA1C 5.3 09/30/2015    HGBA1C 5.5 04/10/2015       Lab Results   Component Value Date     02/27/2017     06/08/2016     05/10/2016    K 4.7 02/27/2017    K 4.0 06/08/2016    K 4.4 05/10/2016     02/27/2017    CL 98 06/08/2016     05/10/2016    CO2 26.0 02/27/2017    CO2 31 06/08/2016    CO2 26 05/10/2016    GLUCOSE 124 (H) 06/08/2016    GLUCOSE 91 05/10/2016    GLUCOSE 94 05/09/2016    BUN 20 02/27/2017    BUN 19 06/08/2016    BUN 26 (H) 05/10/2016     "CREATININE 0.74 02/27/2017    CREATININE 0.76 06/08/2016    CREATININE 0.72 05/10/2016    CALCIUM 9.6 02/27/2017    CALCIUM 9.9 06/08/2016    CALCIUM 9.6 05/10/2016    EGFRCLEARA 75 06/08/2016    EGFRCLEARA 79 05/10/2016    EGFRCLEARA 75 05/09/2016       No results found for: PSA     Lab Results:  CBC:    Lab Results - Last 18 Months  Lab Units 02/27/17  0957 06/08/16  2135 05/10/16  0534 05/09/16  0411 05/08/16  0516 05/07/16  0440 05/06/16  0431   WBC 10*3/mm3 5.30 12.61* 7.36 7.72 7.51 7.86 7.75   HEMATOCRIT % 46.5 42.9 41.0 38.8 39.8 39.0 39.6     CMP:    Lab Results - Last 18 Months  Lab Units 02/27/17  0957 06/08/16  2135 05/10/16  0534 05/09/16  0411 05/08/16  0516 05/07/16  0440 05/06/16  0431   SODIUM mmol/L 139 141 141 141 141 140 139   CHLORIDE mmol/L 103 98 105 107 108 107 108   TOTAL CO2, ARTERIAL mmol/L 26.0  --   --   --   --   --   --    TOTAL CO2 mmol/L  --  31 26 26 25 26 25   BUN mg/dL 20 19 26* 26* 27* 26* 26*   CREATININE mg/dL 0.74 0.76 0.72 0.76 0.78 0.80 0.76   EGFR IF NONAFRICN AM mL/min/1.73 77  --   --   --   --   --   --    EGFR IF AFRICN AM mL/min/1.73 93  --   --   --   --   --   --    CALCIUM mg/dL 9.6 9.9 9.6 9.3 9.2 9.1 9.2     THYROID:    Lab Results - Last 18 Months  Lab Units 02/27/17  0957 05/04/16  1757   TSH mIU/mL 1.300 1.85     A1C:    Lab Results - Last 18 Months  Lab Units 05/05/16  0432   HEMOGLOBIN A1C % 5.3       Objective   /72 (BP Location: Left arm, Patient Position: Sitting, Cuff Size: Large Adult)  Pulse 60  Temp 98.3 °F (36.8 °C) (Oral)   Resp 18  Ht 60\" (152.4 cm)  Wt 162 lb (73.5 kg)  SpO2 97%  BMI 31.64 kg/m2    Physical Exam  GENERAL:  Well nourished/developed in no acute distress. Obese  SKIN: Turgor excellent, without wound, rash, lesion  HEENT: Normal cephalic without trauma.  Pupils equal round reactive to light. Extraocular motions full without nystagmus.  No thyroidmegaly, mass, tenderness, lymphadenopathy and supple. Mild L facial " weakness.  CV: Regular rhythm.  No murmur, gallop,  edema.  No carotid bruits.  CHEST: No chest wall tenderness or mass.   LUNGS: Symmetric motion with clear to auscultation.  ABD: Soft, nontender without mass.   ORTHO: Symmetric extremities without swelling/point tenderness.  Full gross range of motion.  Walking with assistance 4 prong cane.  NEURO: L facial droop; L hemiplegia   PSYCH: Oriented x 3.  Pleasant calm, well kept.  Purposeful/directed conservation with intact short/long gross memory.     Assessment/Plan     1. Abdominal pain, unspecified location  - US Gallbladder  - NM HIDA scan with pharmacological intervention    2. Essential hypertension  If; ok surgery    3. Other cardiac arrhythmia  A fib; if/ok surgery    4. 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 Chronic anticoagulation-ASA 81/DM; a fib deferred/fall risk  same      Rx: reviewed.  Any other changes above and:   LAB: reviewed/above.  Orders above and:     There are no Patient Instructions on file for this visit.    Follow up: Return for as planned 9.5.17.

## 2017-07-14 PROBLEM — R93.89 ABNORMAL X-RAY: Status: ACTIVE | Noted: 2017-01-01

## 2017-07-23 NOTE — ED.PDOC
General


ED Provider: 


Dr. MARGARITO GUILLEN JR





Chief Complaint: Abdominal Pain


Stated Complaint: RUQ ABDOMINAL PAIN RADIATING TO BACK, PAIN IS ON THE RIGHT 

SIDE, FREQ. URINATION  BUT IS AFRAID OF VOIDING ON SELF, WHEN WIPE AFTER BOWEL 

MOVEMENT THE TISSUE WAS RED BUT HAS HEMMORIODS   OFF AND ON FOR YEARS AND NOW 

FOR 2 WEEKS 98.0 58 18 96% 169/79 4/10 [End].  : 07/09/17 :T12 fracture;

constipation;Miralax 1/2; no evidence of cholecystitis;possible urine infection;

agree with physical therapy (UA showed no growth)


Time Seen by Physician: 14:30


Mode of Arrival: Wheelchair


Information Source: Patient, Family


Exam Limitations: No limitations


Primary Care Provider: 


LAUREN HONEYCUTT





Nursing and Triage Documentation Reviewed and Agree: No





Review of Systems





- Review Of Systems


Constitutional: Reports: Malaise


Eyes: Reports: No symptoms


Ears, Nose, Mouth, Throat: Reports: No symptoms


Respiratory: Reports: No symptoms


Cardiac: Reports: No symptoms


GI: Reports: Abdominal pain (RUQ), Nausea (briefly at lunchtime), Other (red 

tissue after BM(hx hemorrhoids))


: Reports: No symptoms


Musculoskeletal: Reports: No symptoms


Skin: Reports: No symptoms


Neurological: Reports: No symptoms


Endocrine: Reports: No symptoms


Hematologic/Lymphatic: Reports: No symptoms


All Other Systems: Other





Past Medical History





- Past Medical History


Endocrine: Reports: DM 2, Hypothyroid


Cardiovascular: Reports: Hypertension


Respiratory: Reports: None


Hematological: Reports: None


Gastrointestinal: Reports: None


Genitourinary: Reports: None


Neuro/Psych: Reports: CVA (left sided weakness post cva), Seizure, Anxiety, 

Depression


Musculoskeletal: Reports: None


Cancer: Reports: None


Last Menstrual Period: NA





- Surgical History


General Surgical History: Reports: Hysterectomy, Other (thyroid surgery )





- Family History


Family History: Reports: Unknown





- Social History


Smoking Status: Never smoker


Hx Substance Use: No


Alcohol Screening: None





- Immunizations


Tetanus Shot up to Date: No





Physical Exam





- Physical Exam


Appearance: Well-appearing


Pain Distress: Moderate


Eyes: PRATIBHA (left lid closed), EOMI, Conjunctiva clear


ENT: Ears normal, Nose normal, Oropharynx normal


Neck: Supple


Respiratory: Airway patent, Breath sounds clear, Breath sounds equal, 

Respirations nonlabored


Cardiovascular: RRR, Pulses normal, No rub, No murmur


GI/: Soft, No masses, Bowel sounds normal, No Organomegaly, Tender (RUQ 

nonfocal tender no mass note told gallbladder testing was normal-sister states 

she had negative testing on her gall bladder)


Musculoskeletal: Limited ROM (left side post CVA)


Skin: Warm, Dry, Normal color


Neurological: Sensation intact, Motor intact, Reflexes intact, Cranial nerves 

intact, Alert, Oriented


Psychiatric: Affect appropriate, Mood appropriate





Critical Care Note





- Critical Care Note


Total Time (mins): 0





Course





- Course


Hematology/Chemistry: 


 07/23/17 14:44





 07/23/17 14:44


Orders, Labs, Meds: 


Lab Review











  07/23/17 07/23/17





  14:15 14:44


 


WBC   7.91


 


RBC   5.12


 


Hgb   14.7


 


Hct   43.5


 


MCV   85.0


 


MCH   28.7


 


MCHC   33.8


 


RDW Coeff of Елена   13.2


 


Plt Count   240


 


Immature Gran % (Auto)   0.3


 


Neut % (Auto)   64.8


 


Lymph % (Auto)   22.3


 


Mono % (Auto)   10.0


 


Eos % (Auto)   1.8


 


Baso % (Auto)   0.8


 


Immature Gran # (Auto)   0.0


 


Neut #   5.1


 


Lymph #   1.8


 


Mono #   0.8


 


Eos #   0.1


 


Baso #   0.1


 


Sodium   140


 


Potassium   4.3


 


Chloride   104


 


Carbon Dioxide   23


 


Anion Gap   17.3


 


BUN   17


 


Creatinine   0.83


 


Estimated GFR (MDRD)   67.00


 


BUN/Creatinine Ratio   20.48


 


Glucose   109


 


Calcium   9.2


 


Total Bilirubin   0.27


 


AST   23


 


ALT   35


 


Alkaline Phosphatase   97


 


Total Protein   6.8


 


Albumin   3.5


 


Globulin   3.3


 


Albumin/Globulin Ratio   1.06


 


Amylase   43


 


Lipase   21


 


Urine Color  Yellow 


 


Urine Clarity  Clear 


 


Urine pH  7.0 


 


Ur Specific Gravity  1.020 


 


Urine Protein  Negative 


 


Urine Glucose (UA)  Negative 


 


Urine Ketones  Negative 


 


Urine Blood  Negative 


 


Urine Nitrite  Negative 


 


Urine Bilirubin  Negative 


 


Urine Urobilinogen  0.2 


 


Ur Leukocyte Esterase  1+ 


 


Ur Squamous Epith Cells  2-5 


 


H. pylori IgG Antibody   Negative








Orders











 Category Date Time Status


 


 AMYLASE Stat LAB  07/23/17 14:44 Completed


 


 CBC W/ AUTO DIFF Stat LAB  07/23/17 14:44 Completed


 


 COMPREHENSIVE METABOLIC PANEL Stat LAB  07/23/17 14:44 Completed


 


 H. PYLORI SCREEN Stat LAB  07/23/17 14:44 Completed


 


 LIPASE Stat LAB  07/23/17 14:44 Completed


 


 URINALYSIS C & S IF INDICATED Stat LAB  07/23/17 14:15 Completed


 


 Hydrocodone Bit/Acetaminophen [Norco 5-325] MEDS  07/23/17 14:53 Discontinued





 1 tab PO ONCE STA   








Medications














Discontinued Medications














Generic Name Dose Route Start Last Admin





  Trade Name Freq  PRN Reason Stop Dose Admin


 


Acetaminophen/Hydrocodone Bitart  1 tab  07/23/17 14:53  07/23/17 15:01





  [place] 5-325  PO  07/23/17 14:54  1 tab





  ONCE STA   Administration











Vital Signs: 


 











  Temp Pulse Resp BP Pulse Ox


 


 07/23/17 13:55  98 F  58 L  18  169/79 H  96














Departure





- Departure


Time of Disposition: 15:22


Disposition: HOME SELF-CARE


Discharge Problem: 


 Abdominal pain





Instructions:  Abdominal Pain (ED)


Condition: Good


Pt referred to PMD for follow-up: Yes


Additional Instructions: 


Follow up with PMD


recurrent abdominal pain


may have occasional Norco for pain


recheck PMD one week


check temperature daily return if over 101.0


Prescriptions: 


Hydrocodone Bit/Acetaminophen [Norco 5-325] 1 - 2 tab PO Q6HR PRN #12 tablet


 PRN Reason: pain


Allergies/Adverse Reactions: 


Allergies





Iodine and Iodide Containing Produc Adverse Reaction (Verified 11/13/16 14:56)


 








Home Medications: 


Ambulatory Orders





Acetaminophen [Mapap] 325 mg PO Q4HR PRN 12/03/15 


Aspirin [Aspirin Chewable] 81 mg PO DAILYWM 12/03/15 


Atorvastatin Calcium 40 mg PO BEDTIME 12/03/15 


Calcium Carbonate/Vitamin D3 [Calcium 500 + D Tablet] 1 each PO BID 12/03/15 


Levetiracetam [Keppra] 1,000 mg PO BID 12/03/15 


Lisinopril [Zestril] 2.5 mg PO DAILY 12/03/15 


Metoprolol Succinate [Toprol Xl] 25 mg PO DAILY 12/03/15 


Omeprazole [Prilosec] 20 mg PO QDAC 12/03/15 


Polyethylene Glycol 3350 [Miralax] 17 gm PO DAILY 12/03/15 


Potassium Chloride [K-Dur] 20 meq PO BID 12/03/15 


Primidone [Mysoline] 100 mg PO TID 12/03/15 


Propafenone HCl [Rythmol] 150 mg PO TID 12/03/15 


Vit C/Vit E/Lutein/Min/Omega-3 [Ocuvite Softgel] 1 each PO DAILY 12/03/15 


Digoxin [Lanoxin] 62.5 mcg PO DAILY 11/13/16 


Estrogens, Conjugated [Premarin Vaginal Cream] 1 applic VG DAILY PRN 11/13/16 


Hydrocodone Bit/Acetaminophen [Norco 5-325] 1 - 2 tab PO Q6HR PRN #12 tablet 07/ 23/17 


Multivitamin 1 cap PO DAILY 07/23/17

## 2017-07-24 NOTE — TELEPHONE ENCOUNTER
Ana Lilia called and states she had to take pt to ER over the weekend again for the sxs of gallbladder even thou all test show gb ok Ana Lilia would like to know if pt could be referred to a surgeon to see if they can see what is going on Ana Lilia [phone]

## 2017-08-01 NOTE — PROGRESS NOTES
Subjective     Chief Complaint   Patient presents with   • Tremors     6 month f/u of essential tremors.  Pt states that her tremors are about the same.     • Stroke     no new symptoms         Daphne Santos is a 76 y.o. female right handed and lives at assisted living.  She is accompanied by her sister. She was last seen  She was last seen 6/2016 after a hospital stay with worsening tremor of LUE.  As you recall she has history of RMCA and post stroke epilepsy in 2015.  She has an essential tremor component as well.  In May 2016 Primidone was increased to 100 mg TID and the patient is tolerating this.  Since last visit she denies new stroke symptoms or seizures.  She denies worsening tremor.  She does have history of afib and had been taking Xarelto but she fell in 12/15 and suffered a hemothorax.  Xarelto was discontinued at that time and not resumed and she has fallen several times since once striking her forehead.  Her last fall was about 1 month ago on a side walk and had no apparent injury.  It was decided the risk of bleed was higher with the Xarelto than stroke and she now takes ASA 81 mg daily.  The patient also has history of HTN, dyslipidemia, and GERD.       Seizures    This is a chronic problem. Episode onset: last reported episode may 2016. Associated symptoms include sleepiness. Pertinent negatives include no confusion, no headaches, no sore throat, no chest pain, no cough, no nausea, no vomiting and no diarrhea. involuntary tremor LUE   Stroke   This is a chronic problem. The current episode started more than 1 year ago (2015). The problem has been gradually improving. Associated symptoms include arthralgias (knees) and weakness (LUE/LLE). Pertinent negatives include no chest pain, coughing, diaphoresis, fatigue, fever, headaches, nausea, sore throat or vomiting. Associated symptoms comments: Left sided weakness. Exacerbated by: afib. The treatment provided moderate relief.        Current Outpatient  Prescriptions   Medication Sig Dispense Refill   • acetaminophen (TYLENOL) 325 MG tablet Take 650 mg by mouth every night.     • aspirin 81 MG EC tablet Take 81 mg by mouth daily.     • atorvastatin (LIPITOR) 40 MG tablet TAKE ONE TABLET NIGHTLY 90 tablet 1   • calcium carbonate-vitamin d (CALCIUM 600+D) 600-400 MG-UNIT per tablet Take 1 tablet by mouth 2 (two) times a day.     • conjugated estrogens (PREMARIN) 0.625 MG/GM vaginal cream Insert 30 g into the vagina 3 (three) times a week.     • digoxin (LANOXIN) 125 MCG tablet TAKE 1/2 TABLET DAILY 15 tablet 5   • fluticasone (FLONASE) 50 MCG/ACT nasal spray USE 2 SPRAYS IN EACH NOSTRIL DAILY 16 mL 5   • levETIRAcetam (KEPPRA) 1000 MG tablet Take 1 tablet by mouth 2 (Two) Times a Day. 60 tablet 5   • lisinopril (PRINIVIL,ZESTRIL) 2.5 MG tablet TAKE ONE TABLET DAILY 90 tablet 1   • metoprolol succinate XL (TOPROL-XL) 25 MG 24 hr tablet TAKE ONE TABLET DAILY 90 tablet 1   • Multiple Vitamins-Minerals (EYE VITAMINS PO) Take 1 tablet by mouth daily.     • Multiple Vitamins-Minerals (MULTIVITAMIN ADULT PO) Take 1 tablet by mouth daily.     • omeprazole (priLOSEC) 20 MG capsule TAKE ONE CAPSULE DAILY 30 capsule 5   • polyethylene glycol (MIRALAX) packet Take 17 g by mouth daily.     • potassium chloride (K-DUR,KLOR-CON) 20 MEQ CR tablet TAKE ONE TABLET TWICE DAILY 180 tablet 1   • primidone (MYSOLINE) 50 MG tablet Take 2 tablets by mouth 3 (Three) Times a Day. 180 tablet 5   • propafenone (RHTHYMOL) 150 MG tablet TAKE ONE TABLET THREE TIMES DAILY 90 tablet 5     No current facility-administered medications for this visit.        Past Medical History:   Diagnosis Date   • Atrial fibrillation    • Chest pain    • Hypertension    • Seizures    • Stroke    • Syncope and collapse    • Tremor    • Ventricular tachycardia (paroxysmal)        Past Surgical History:   Procedure Laterality Date   • CATARACT EXTRACTION     • HYSTERECTOMY         family history includes Cancer in her  "father; Heart disease in her mother; Heart failure in her mother; No Known Problems in her brother, sister, and sister; Stroke in her mother.    Social History   Substance Use Topics   • Smoking status: Never Smoker   • Smokeless tobacco: Never Used   • Alcohol use No       Review of Systems   Constitutional: Negative for diaphoresis, fatigue and fever.   HENT: Negative.  Negative for rhinorrhea and sore throat.    Eyes: Negative.         History of macular degenerations and decrease vision left > right   Respiratory: Negative.  Negative for cough, choking and wheezing.    Cardiovascular: Negative.  Negative for chest pain.   Gastrointestinal: Positive for constipation. Negative for blood in stool, diarrhea, nausea and vomiting.   Endocrine: Negative.    Genitourinary: Negative.  Negative for dysuria and urgency.   Musculoskeletal: Positive for arthralgias (knees). Gait problem: walks with rollerator.   Skin: Negative.    Allergic/Immunologic: Negative.    Neurological: Positive for tremors (ET bilateral and head tremor), seizures, weakness (LUE/LLE) and light-headedness. Negative for dizziness and headaches.   Hematological: Negative.  Negative for adenopathy.   Psychiatric/Behavioral: Negative.  Negative for agitation and confusion.   All other systems reviewed and are negative.      Objective     /84  Pulse 57  Ht 60\" (152.4 cm)  Wt 157 lb (71.2 kg)  SpO2 98%  BMI 30.66 kg/m2, Body mass index is 30.66 kg/(m^2).    Physical Exam   Constitutional: She is oriented to person, place, and time. Vital signs are normal. She appears well-developed and well-nourished.   HENT:   Head: Normocephalic and atraumatic.   Right Ear: External ear normal.   Left Ear: External ear normal.   Nose: Nose normal.   Mouth/Throat: Uvula is midline and oropharynx is clear and moist.   Eyes: Conjunctivae, EOM and lids are normal. Pupils are equal, round, and reactive to light.   Eye lid droops causing ptosis left> right   Neck: " Trachea normal and normal range of motion. Neck supple. Carotid bruit is not present.   Cardiovascular: Normal rate, regular rhythm, S1 normal, S2 normal and normal heart sounds.    Pulmonary/Chest: Effort normal and breath sounds normal.   Abdominal: Soft. Bowel sounds are normal.   Musculoskeletal:        Right shoulder: She exhibits decreased range of motion.        Left shoulder: She exhibits decreased range of motion (hx of left humerus fracture 2016.).   Neurological: She is alert and oriented to person, place, and time. She has normal strength and normal reflexes. She displays tremor (essential tremor bilateral and head and voice tremor. patient able to feed/dress self and perform ADLs.). No sensory deficit. She displays a negative Romberg sign. She displays no seizure activity. Gait (walks wiht rollerator) abnormal. Abnormal coordination: no ataxia but does have ET.   CN II:  Visual fields full.  Pupils equally reactive to light  CN III, IV, VI:  Extraocular Muscles full with no signs of nystagmus  CN V:  Facial sensory is symmetric with no asymetries.  CN VII:  Facial motor symmetric  CN VIII:  Gross hearing intact bilaterally  CN IX:  Palate elevates symmetrically  CN X:  Palate elevates symmetrically  CN XI:  Shoulder shrug symmetric  CN XII:  Tongue is midline on protrusion   Skin: Skin is warm and dry.   Psychiatric: She has a normal mood and affect. Her speech is normal and behavior is normal. Cognition and memory are normal.   Nursing note and vitals reviewed.           ASSESSMENT/PLAN    Diagnoses and all orders for this visit:    Hyperlipidemia, unspecified hyperlipidemia type    Essential hypertension    Paroxysmal atrial fibrillation    Tremor, essential    Intractable epilepsy without status epilepticus, unspecified epilepsy type    Chronic ischemic right MCA stroke    MEDICAL DECISION MAKIN. Continue with ASA 81 mg daily. Patient does have hx of AFIB but has hx of hemothorax and frequent  falls and determined with this more risk for bleeding secondary to fall than stroke risk  2. Continue with Keppra 1000 mg BID   3. Continue with statin per PCP for LDL goal less than 70.  4. BP managed by PCP for systolic less than 140.  5. Continue with Primidone 100 mg TID  6. Does not use tobacco.  7. Elevated BMI -      8.Seizure precautions were discussed to include no tub baths, no swimming, avoiding lack of sleep, and avoiding known triggers. Education given of things that may contribute to a seizure to include, but not limited to: stressful situations, fever, fatigue, lack of sleep, low blood sugar, hyperventilation, flashing lights, and caffeine. Instructions given to take seizure medications as prescribed. Education given to family member on what to do during a seizure and care following the seizure. Education given to contact this office prior to stopping or changing any medications.  Patient given printed education with AVS for diet/exerise with AVS and encouraged to include 30 minutes of exercise 3-4 times weekly.  9.Patient is counseled on stroke signs and symptoms using FAST and Time Saved is Brain Saved.             allergies and all known medications/prescriptions have been reviewed using resources available on this encounter.    Return in about 6 months (around 2/1/2018).        Mary Ann Mccurdy, CHRISTIANE

## 2017-08-01 NOTE — PATIENT INSTRUCTIONS
Epilepsy  Epilepsy is a disorder in which a person has repeated seizures over time. A seizure is a release of abnormal electrical activity in the brain. Seizures can cause a change in attention, behavior, or the ability to remain awake and alert (altered mental status). Seizures often involve uncontrollable shaking (convulsions).   Most people with epilepsy lead normal lives. However, people with epilepsy are at an increased risk of falls, accidents, and injuries. Therefore, it is important to begin treatment right away.  CAUSES   Epilepsy has many possible causes. Anything that disturbs the normal pattern of brain cell activity can lead to seizures. This may include:   · Head injury.  · Birth trauma.  · High fever as a child.  · Stroke.  · Bleeding into or around the brain.  · Certain drugs.  · Prolonged low oxygen, such as what occurs after CPR efforts.  · Abnormal brain development.  · Certain illnesses, such as meningitis, encephalitis (brain infection), malaria, and other infections.  · An imbalance of nerve signaling chemicals (neurotransmitters).    SIGNS AND SYMPTOMS   The symptoms of a seizure can vary greatly from one person to another. Right before a seizure, you may have a warning (aura) that a seizure is about to occur. An aura may include the following symptoms:  · Fear or anxiety.  · Nausea.  · Feeling like the room is spinning (vertigo).  · Vision changes, such as seeing flashing lights or spots.  Common symptoms during a seizure include:  · Abnormal sensations, such as an abnormal smell or a bitter taste in the mouth.    · Sudden, general body stiffness.    · Convulsions that involve rhythmic jerking of the face, arm, or leg on one or both sides.    · Sudden change in consciousness.      Appearing to be awake but not responding.      Appearing to be asleep but cannot be awakened.    · Grimacing, chewing, lip smacking, drooling, tongue biting, or loss of bowel or bladder control.  After a seizure,  you may feel sleepy for a while.   DIAGNOSIS   Your health care provider will ask about your symptoms and take a medical history. Descriptions from any witnesses to your seizures will be very helpful in the diagnosis. A physical exam, including a detailed neurological exam, is necessary. Various tests may be done, such as:   · An electroencephalogram (EEG). This is a painless test of your brain waves. In this test, a diagram is created of your brain waves. These diagrams can be interpreted by a specialist.  · An MRI of the brain.    · A CT scan of the brain.    · A spinal tap (lumbar puncture, LP).  · Blood tests to check for signs of infection or abnormal blood chemistry.  TREATMENT   There is no cure for epilepsy, but it is generally treatable. Once epilepsy is diagnosed, it is important to begin treatment as soon as possible. For most people with epilepsy, seizures can be controlled with medicines. The following may also be used:  · A pacemaker for the brain (vagus nerve stimulator) can be used for people with seizures that are not well controlled by medicine.  · Surgery on the brain.  For some people, epilepsy eventually goes away.  HOME CARE INSTRUCTIONS   ·  Follow your health care provider's recommendations on driving and safety in normal activities.  · Get enough rest. Lack of sleep can cause seizures.  · Only take over-the-counter or prescription medicines as directed by your health care provider. Take any prescribed medicine exactly as directed.  · Avoid any known triggers of your seizures.  · Keep a seizure diary. Record what you recall about any seizure, especially any possible trigger.    · Make sure the people you live and work with know that you are prone to seizures. They should receive instructions on how to help you. In general, a witness to a seizure should:      Cushion your head and body.      Turn you on your side.      Avoid unnecessarily restraining you.      Not place anything inside your  mouth.      Call for emergency medical help if there is any question about what has occurred.    · Follow up with your health care provider as directed. You may need regular blood tests to monitor the levels of your medicine.    SEEK MEDICAL CARE IF:   · You develop signs of infection or other illness. This might increase the risk of a seizure.    · You seem to be having more frequent seizures.    · Your seizure pattern is changing.    SEEK IMMEDIATE MEDICAL CARE IF:   · You have a seizure that does not stop after a few moments.    · You have a seizure that causes any difficulty in breathing.    · You have a seizure that results in a very severe headache.    · You have a seizure that leaves you with the inability to speak or use a part of your body.       This information is not intended to replace advice given to you by your health care provider. Make sure you discuss any questions you have with your health care provider.     Document Released: 12/18/2006 Document Revised: 04/10/2017 Document Reviewed: 07/30/2014  [employer] Interactive Patient Education ©2017 [employer]  BMI for Adults  Body mass index (BMI) is a number that is calculated from a person's weight and height. In most adults, the number is used to find how much of an adult's weight is made up of fat. BMI is not as accurate as a direct measure of body fat.  HOW IS BMI CALCULATED?  BMI is calculated by dividing weight in kilograms by height in meters squared. It can also be calculated by dividing weight in pounds by height in inches squared, then multiplying the resulting number by 703. Charts are available to help you find your BMI quickly and easily without doing this calculation.   HOW IS BMI INTERPRETED?  Health care professionals use BMI charts to identify whether an adult is underweight, at a normal weight, or overweight based on the following guidelines:  · Underweight: BMI less than 18.5.  · Normal weight: BMI between 18.5 and  24.9.  · Overweight: BMI between 25 and 29.9.  · Obese: BMI of 30 and above.  BMI is usually interpreted the same for males and females.  Weight includes both fat and muscle, so someone with a muscular build, such as an athlete, may have a BMI that is higher than 24.9. In cases like these, BMI may not accurately depict body fat. To determine if excess body fat is the cause of a BMI of 25 or higher, further assessments may need to be done by a health care provider.  WHY IS BMI A USEFUL TOOL?  BMI is used to identify a possible weight problem that may be related to a medical problem or may increase the risk for medical problems. BMI can also be used to promote changes to reach a healthy weight.     This information is not intended to replace advice given to you by your health care provider. Make sure you discuss any questions you have with your health care provider.     Document Released: 08/29/2005 Document Revised: 01/08/2016 Document Reviewed: 05/15/2015  [employer] Interactive Patient Education ©2017 [employer]

## 2017-08-04 NOTE — TELEPHONE ENCOUNTER
----- Message from Daphne Santos sent at 8/4/2017 10:45 AM CDT -----  Regarding: Referral Request  Contact: [phone]  Would it be possible to get some help for Daphne to relearn how to walk(she is turning her left foot to the side), get in a car/truck, left her left arm.  She has gotten much slower and will not raise her left arm as well as she should.  She would prefer to go to out patient at [facility].  Thank you, Ana Lilia

## 2017-08-04 NOTE — TELEPHONE ENCOUNTER
----- Message from Daphne Santos sent at 8/4/2017 10:45 AM CDT -----  Regarding: Referral Request  Contact: [phone]  Would it be possible to get some help for Daphne to relearn how to walk(she is turning her left foot to the side), get in a car/truck, left her left arm.  She has gotten much slower and will not raise her left arm as well as she should.  She would prefer to go to out patient at [facility].  Thank you, Ana Lilia

## 2017-08-09 PROBLEM — R10.13 EPIGASTRIC PAIN: Status: ACTIVE | Noted: 2017-01-01

## 2017-08-09 PROBLEM — I10 HTN (HYPERTENSION), BENIGN: Status: ACTIVE | Noted: 2017-01-01

## 2017-08-09 NOTE — PROGRESS NOTES
Daphne Santos  1940 8/9/2017  Chief Complaint   Patient presents with   • GI Problem     Right sided abdominal pain     Subjective   HPI  Daphne Santos is a 77 y.o. female who presents with a complaint of epigastric pain that radiates to the right side and through to her back ongoing for over 1 year coming and going. When she has the pain is lasts minutes to hours. No alleviating factors.  She is seeing Dr Luna for possible cholecystectomy she felt that Endoscopy should be considered. It is described as a gnawing pain and rated 5 out of 10. She denies any nausea or vomiting. She says that high fat greasy meats may make it worse. US of the gallbladder on 7/12/2017 showed negative ultrasound of the gallbladder. EF was 63%. She does not take any NSAIDs for pain.  She has a long hx of reflux and indigestion this is unstable with Omeprazole 20 mg daily with continued belching acid reflux that is almost daily even with Omeprazole daily.   Past Medical History:   Diagnosis Date   • Atrial fibrillation    • Chest pain    • Hx of colonic polyps    • Hypertension    • Seizures    • Stroke    • Syncope and collapse    • Tremor    • Ventricular tachycardia (paroxysmal)      Past Surgical History:   Procedure Laterality Date   • CATARACT EXTRACTION     • COLONOSCOPY W/ POLYPECTOMY  04/13/2012    Small polyp at 40 cm repeat exam in 5 in years   • ENDOSCOPY  07/23/2008    Normal exam   • HYSTERECTOMY       Outpatient Prescriptions Marked as Taking for the 8/9/17 encounter (Office Visit) with CHRISTIANE Ortiz   Medication Sig Dispense Refill   • acetaminophen (TYLENOL) 325 MG tablet Take 650 mg by mouth every night.     • aspirin 81 MG EC tablet Take 81 mg by mouth daily.     • atorvastatin (LIPITOR) 40 MG tablet TAKE ONE TABLET NIGHTLY 90 tablet 1   • calcium carbonate-vitamin d (CALCIUM 600+D) 600-400 MG-UNIT per tablet Take 1 tablet by mouth 2 (two) times a day.     • conjugated estrogens (PREMARIN) 0.625  MG/GM vaginal cream Insert 30 g into the vagina 3 (three) times a week.     • digoxin (LANOXIN) 125 MCG tablet TAKE 1/2 TABLET DAILY 15 tablet 5   • digoxin (LANOXIN) 125 MCG tablet TAKE 1/2 TABLET DAILY 15 tablet 5   • fluticasone (FLONASE) 50 MCG/ACT nasal spray USE 2 SPRAYS IN EACH NOSTRIL DAILY 16 mL 5   • levETIRAcetam (KEPPRA) 1000 MG tablet Take 1 tablet by mouth 2 (Two) Times a Day. 60 tablet 5   • lisinopril (PRINIVIL,ZESTRIL) 2.5 MG tablet TAKE ONE TABLET DAILY 90 tablet 1   • metoprolol succinate XL (TOPROL-XL) 25 MG 24 hr tablet TAKE ONE TABLET DAILY 90 tablet 1   • Multiple Vitamins-Minerals (EYE VITAMINS PO) Take 1 tablet by mouth daily.     • Multiple Vitamins-Minerals (MULTIVITAMIN ADULT PO) Take 1 tablet by mouth daily.     • omeprazole (priLOSEC) 20 MG capsule TAKE ONE CAPSULE DAILY 30 capsule 5   • polyethylene glycol (MIRALAX) packet Take 17 g by mouth daily.     • potassium chloride (K-DUR,KLOR-CON) 20 MEQ CR tablet TAKE ONE TABLET TWICE DAILY 180 tablet 1   • potassium chloride (K-DUR,KLOR-CON) 20 MEQ CR tablet TAKE ONE TABLET TWICE DAILY 180 tablet 1   • primidone (MYSOLINE) 50 MG tablet Take 2 tablets by mouth 3 (Three) Times a Day. 180 tablet 5   • propafenone (RHTHYMOL) 150 MG tablet TAKE ONE TABLET THREE TIMES DAILY 90 tablet 5     Allergies   Allergen Reactions   • Contrast Dye Hives   • Nexium [Esomeprazole]      Social History     Social History   • Marital status: Single     Spouse name: N/A   • Number of children: N/A   • Years of education: N/A     Occupational History   • Not on file.     Social History Main Topics   • Smoking status: Never Smoker   • Smokeless tobacco: Never Used   • Alcohol use No   • Drug use: No   • Sexual activity: No     Other Topics Concern   • Not on file     Social History Narrative     Family History   Problem Relation Age of Onset   • Heart disease Mother    • Stroke Mother    • Heart failure Mother    • Colon polyps Mother    • Cancer Father    • No  "Known Problems Sister    • No Known Problems Brother    • No Known Problems Sister    • Colon cancer Neg Hx      Health Maintenance   Topic Date Due   • TDAP/TD VACCINES (1 - Tdap) 08/07/1961   • PNEUMOCOCCAL VACCINES (65+ LOW/MEDIUM RISK) (1 of 2 - PCV13) 08/07/2007   • MEDICARE ANNUAL WELLNESS  08/23/2016   • MAMMOGRAM  08/23/2016   • ZOSTER VACCINE  08/23/2016   • COLONOSCOPY  08/26/2016   • LIPID PANEL  05/05/2017   • DIABETIC FOOT EXAM  08/09/2017   • HEMOGLOBIN A1C  08/09/2017   • DIABETIC EYE EXAM  08/09/2017   • URINE MICROALBUMIN  08/09/2017   • INFLUENZA VACCINE  09/01/2017     Review of Systems   Constitutional: Negative for appetite change, fatigue and fever.   HENT: Negative for drooling, ear pain, facial swelling, mouth sores, sore throat and tinnitus.    Eyes: Negative.    Respiratory: Negative for cough, chest tightness, shortness of breath and stridor.    Gastrointestinal: Positive for abdominal pain. Negative for constipation, diarrhea, nausea and vomiting.   Endocrine: Negative for cold intolerance and heat intolerance.   Genitourinary: Positive for frequency. Negative for dysuria and hematuria.   Musculoskeletal: Negative for arthralgias and myalgias.   Neurological: Negative for dizziness, tremors, seizures, syncope, weakness and headaches.   Psychiatric/Behavioral: Negative for agitation, confusion and suicidal ideas. The patient is not nervous/anxious.      Objective   Vitals:    08/09/17 1300   BP: 130/70   Pulse: 54   SpO2: 99%   Weight: 157 lb (71.2 kg)   Height: 60\" (152.4 cm)     Body mass index is 30.66 kg/(m^2).  Physical Exam   Constitutional: She is oriented to person, place, and time. She appears well-developed and well-nourished.   HENT:   Head: Normocephalic and atraumatic.   Eyes: Pupils are equal, round, and reactive to light.   Neck: Normal range of motion. Neck supple. No tracheal deviation present.   Cardiovascular: Normal rate, regular rhythm and normal heart sounds.  Exam " reveals no gallop and no friction rub.    No murmur heard.  Pulmonary/Chest: Effort normal and breath sounds normal. No respiratory distress. She has no wheezes. She has no rales. She exhibits no tenderness.   Abdominal: Soft. Bowel sounds are normal. She exhibits no distension. There is no hepatosplenomegaly. There is tenderness (mild tenderness to the epigastric region). There is no rigidity, no rebound and no guarding.   Musculoskeletal: Normal range of motion. She exhibits no edema, tenderness or deformity.   Neurological: She is alert and oriented to person, place, and time. She has normal reflexes.   Skin: Skin is warm and dry. No rash noted. No pallor.   Psychiatric: She has a normal mood and affect. Her behavior is normal. Judgment and thought content normal.     Assessment/Plan   Daphne was seen today for gi problem.    Diagnoses and all orders for this visit:    Epigastric pain  -     Case Request; Standing  -     Implement Anesthesia Orders Day of Procedure; Standing  -     Obtain Informed Consent; Standing  -     Case Request    Gastroesophageal reflux disease without esophagitis    Obesity, unspecified obesity severity, unspecified obesity type    HTN (hypertension), benign  Comments:  continue BP medication the day of procedure.     Other orders  -     omeprazole (priLOSEC) 20 MG capsule; Take 1 capsule by mouth 2 (Two) Times a Day.    Long discussion regarding her pain. She is going to increase her PPI to twice per day to see if this helps.     ESOPHAGOGASTRODUODENOSCOPY WITH ANESTHESIA (N/A) All risks benefits, indications and alternatives discussed.   EMR Dragon/transcription disclaimer: Much of this encounter note is electronic transcription/translation of spoken language to printed text. The electronic translation of spoken language may be erroneous, or at times, nonsensical words or phrases may be inadvertently transcribed. Although I have reviewed the note for such errors, some may still  exist.  Body mass index is 30.66 kg/(m^2).  Return if symptoms worsen or fail to improve.      All risks, benefits, alternatives, and indications of colonoscopy and/or Endoscopy procedure have been discussed with the patient. Risks to include perforation of the colon requiring possible surgery or colostomy, risk of bleeding from biopsies or removal of colon tissue, possibility of missing a colon polyp or cancer, or adverse drug reaction.  Benefits to include the diagnosis and management of disease of the colon and rectum. Alternatives to include barium enema, radiographic evaluation, lab testing or no intervention. Pt verbalizes understanding and agrees.

## 2017-08-14 NOTE — TELEPHONE ENCOUNTER
Lenore PT [facility] called and needs a order for Rolator walker faxed to , order done and faxed

## 2017-08-24 NOTE — ANESTHESIA PREPROCEDURE EVALUATION
Anesthesia Evaluation     Patient summary reviewed and Nursing notes reviewed   no history of anesthetic complications:  NPO Solid Status: > 8 hours  NPO Liquid Status: > 8 hours     Airway   Mallampati: I  TM distance: >3 FB  Neck ROM: full  no difficulty expected  Dental    (+) upper dentures    Comment: Lower partials    Pulmonary - normal exam    breath sounds clear to auscultation  (-) asthma, recent URI, sleep apnea, not a smoker  Cardiovascular   Exercise tolerance: poor (<4 METS)    ECG reviewed  Patient on routine beta blocker and Beta blocker given within 24 hours of surgery  Rhythm: regular  Rate: normal    (+) hypertension, dysrhythmias Atrial Fib, CHF, hyperlipidemia  (-) pacemaker, past MI, murmur, cardiac stents    ROS comment: TTE 4/20/15 reviewed    Neuro/Psych  (+) seizures, CVA (April 2015, left side) residual symptoms, tremors,    GI/Hepatic/Renal/Endo    (+) obesity,  GERD, hypothyroidism,   (-) liver disease, no renal disease, diabetes    Musculoskeletal     Abdominal    Substance History      OB/GYN          Other                                    Anesthesia Plan    ASA 3     general   total IV anesthesia  intravenous induction   Anesthetic plan and risks discussed with patient.

## 2017-08-24 NOTE — PLAN OF CARE
Problem: Patient Care Overview (Adult)  Goal: Plan of Care Review  Outcome: Outcome(s) achieved Date Met:  08/24/17 08/24/17 1327   Coping/Psychosocial Response Interventions   Plan Of Care Reviewed With patient;family   Patient Care Overview   Progress no change   Outcome Evaluation   Outcome Summary/Follow up Plan meets discharge criteria         Problem: GI Endoscopy (Adult)  Goal: Signs and Symptoms of Listed Potential Problems Will be Absent or Manageable (GI Endoscopy)  Outcome: Outcome(s) achieved Date Met:  08/24/17 08/24/17 1327   GI Endoscopy   Problems Assessed (GI Endoscopy) all   Problems Present (GI Endoscopy) none

## 2017-08-24 NOTE — ANESTHESIA POSTPROCEDURE EVALUATION
"Patient: Daphne Santos    Procedure Summary     Date Anesthesia Start Anesthesia Stop Room / Location    08/24/17 1254 1259  [facility] /  [facility]       Procedure Diagnosis Surgeon Provider    ESOPHAGOGASTRODUODENOSCOPY WITH ANESTHESIA (N/A Esophagus) Epigastric pain  (Epigastric pain [R10.13]) MD Michael Hendricks CRNA          Anesthesia Type: general  Last vitals  BP        Temp        Pulse       Resp        SpO2          Post Anesthesia Care and Evaluation    Patient location during evaluation: PACU  Patient participation: complete - patient participated  Level of consciousness: awake and alert  Pain score: 1  Pain management: adequate  Airway patency: patent  Anesthetic complications: No anesthetic complications    Cardiovascular status: acceptable  Respiratory status: room air  Hydration status: acceptable    Blood pressure 156/72, pulse 67, temperature 98 °F (36.7 °C), temperature source Temporal Artery , resp. rate 16, height 60\" (152.4 cm), weight 157 lb (71.2 kg), SpO2 96 %, not currently breastfeeding.      "

## 2017-08-24 NOTE — PLAN OF CARE
Problem: Patient Care Overview (Adult)  Goal: Plan of Care Review  Outcome: Ongoing (interventions implemented as appropriate)    08/24/17 1258   Coping/Psychosocial Response Interventions   Plan Of Care Reviewed With patient   Patient Care Overview   Progress no change   Outcome Evaluation   Outcome Summary/Follow up Plan pt tolertaed procedure well.

## 2017-08-24 NOTE — PLAN OF CARE
Problem: Patient Care Overview (Adult)  Goal: Adult Individualization and Mutuality  Outcome: Ongoing (interventions implemented as appropriate)    08/24/17 1128   Individualization   Patient Specific Preferences none

## 2017-09-05 PROBLEM — Z01.89 LABORATORY TEST: Status: ACTIVE | Noted: 2017-01-01

## 2017-09-05 NOTE — TELEPHONE ENCOUNTER
----- Message from Daphne Villaseñorter sent at 9/1/2017  8:00 PM CDT -----  Regarding: Test Results Question  Contact: [phone]  I understood that after the test done by Dr. Olvera that Dr. Luna would consider taking out the gallbladder.  Daphne has been hurting on her right side, back and stomach.  She has been watching what she has been eating to try and not upset things, but it has gotten to a point that several things seem to upset her.  I don't know for sure what to do to help her.  Daphne is to see Dr. Sevilla on Tuesday Sept. 5.  Please have some answers to help her feel better!  Thank you,  MACHELLE

## 2017-09-05 NOTE — PROGRESS NOTES
Subjective   Daphne Santos is a 77 y.o. female presenting with chief complaint of:   Chief Complaint   Patient presents with   • Hip Pain     left   • Follow-up     tests   • Hyperlipidemia   • Hypertension       History of Present Illness :  With sister.   Has multiple chronic problems.  One of these problems is abdominal pains.  Last seen ? Was if could be gallbladder.   Pain is ongoing, on/off and RUQ to R flank.  Certain/fatty foods make it worse.  Saw A Jeremy; she wanted GI opinion . EGD was normal. EF 63% and US neg of liver/gb.  Last CT pelvis with fall 3.2017:     Osteopenia but no definite evidence of displaced fracture deformity.  If there is high clinical concern for radiographically occult pelvic  fracture or LEFT hip fracture, MRI could be obtained.  2. Degenerative changes in the lumbar spine.  3. Exophytic lesions project off the LEFT kidney. These may be cysts but  are indeterminate on this examination. Nonemergent ultrasound of the  kidneys is recommended.  4. Nonobstructing nephrolith at the inferior pole of the RIGHT kidney.  5. Small ventral abdominal wall hernia just above the pubic symphysis,  containing mesenteric fat.  6. Findings suggestive of chronic gluteal tendon tears on the LEFT,  anteriorly.  7. Contusion/hematoma in the subcutaneous fat overlying the LEFT hip.       Other chronic problem/s to consider:   HTN.  The HTN has been present for years/it is chronic.  The HTN is assumed essential/without testing needed to look for other.  The HTN is controlled manifest by todays blood pressure and no home monitoring.   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 It is chronic.  The arrthymia is primarily a fib   The rhythm is not associated with syncope/near syncope, dizziness, or weakness. Medications being used help.  GE reflux/heartburn: This has been present for years/over a year.  It is chronic.   It is stable as there is no change in infrequent heartburn and no  dysphagia.  Prilosec treated.   Hyperlipidemia: This has been present for years/over a year.  It is chronic.   It is controlled.   Labs are done regularly and prove control.  Lipitor treated.   Hypothyroidism/abnormal thyroid lab: This has been present for years/over a year.  It is chronic.  It is stable as there has been stable labs and energy level, hair-skin texture, stooling, are all the same; no palpitations.. Thyroid replaced.   Fatty liver: This was discovered years ago/it is chronic.  This problem is asymptomatic for pain, nausea. There are regular labs to follow this.    History CVA:  This happened 2015.  It was without bleed.  The neuro deficits have been stable; L hemiplegia.     Has an/another acute issue today: none.    The following portions of the patient's history were reviewed and updated as appropriate: allergies, current medications, past family history, past medical history, past social history, past surgical history and problem list.  Records acquired and reviewed; TCC migrated.      Current Outpatient Prescriptions:   •  acetaminophen (TYLENOL) 325 MG tablet, Take 650 mg by mouth every night., Disp: , Rfl:   •  aspirin 81 MG EC tablet, Take 81 mg by mouth daily., Disp: , Rfl:   •  atorvastatin (LIPITOR) 40 MG tablet, TAKE ONE TABLET NIGHTLY, Disp: 90 tablet, Rfl: 1  •  calcium carbonate-vitamin d (CALCIUM 600+D) 600-400 MG-UNIT per tablet, Take 1 tablet by mouth 2 (two) times a day., Disp: , Rfl:   •  digoxin (LANOXIN) 125 MCG tablet, TAKE 1/2 TABLET DAILY, Disp: 15 tablet, Rfl: 5  •  fluticasone (FLONASE) 50 MCG/ACT nasal spray, USE 2 SPRAYS IN EACH NOSTRIL DAILY, Disp: 16 mL, Rfl: 5  •  levETIRAcetam (KEPPRA) 1000 MG tablet, Take 1 tablet by mouth 2 (Two) Times a Day., Disp: 60 tablet, Rfl: 5  •  lisinopril (PRINIVIL,ZESTRIL) 2.5 MG tablet, TAKE ONE TABLET DAILY, Disp: 90 tablet, Rfl: 1  •  Magnesium 200 MG tablet, Take  by mouth., Disp: , Rfl:   •  metoprolol succinate XL (TOPROL-XL) 25 MG  24 hr tablet, TAKE ONE TABLET DAILY, Disp: 90 tablet, Rfl: 1  •  Multiple Vitamins-Minerals (EYE VITAMINS PO), Take 1 tablet by mouth daily., Disp: , Rfl:   •  Multiple Vitamins-Minerals (MULTIVITAMIN ADULT PO), Take 1 tablet by mouth daily., Disp: , Rfl:   •  omeprazole (priLOSEC) 20 MG capsule, Take 1 capsule by mouth 2 (Two) Times a Day., Disp: 60 capsule, Rfl: 11  •  polyethylene glycol (MIRALAX) packet, Take 17 g by mouth daily., Disp: , Rfl:   •  potassium chloride (K-DUR,KLOR-CON) 20 MEQ CR tablet, TAKE ONE TABLET TWICE DAILY, Disp: 180 tablet, Rfl: 1  •  primidone (MYSOLINE) 50 MG tablet, Take 2 tablets by mouth 3 (Three) Times a Day., Disp: 180 tablet, Rfl: 5  •  propafenone (RHTHYMOL) 150 MG tablet, TAKE ONE TABLET THREE TIMES DAILY, Disp: 90 tablet, Rfl: 5    Allergies   Allergen Reactions   • Contrast Dye Hives   • Nexium [Esomeprazole]      Doesn't remember        Review of Systems  GENERAL:  Active/slower with limits, speed, samni for age and L hemiplegia. Sleep is ok. No fever now.  ENDO:  No syncope, near or diaphoretic sweaty spells.  HEENT: No head injury or headache,  No vision change, No significant hearing loss.  Ears without pain/drainage.  No sore throat.  No nasal/sinus congestion/drainage. No epistaxis.  CHEST: No chest wall tenderness or mass. No cough, without wheeze, SOB; no hemoptysis.  CV: No chest pain, palpatations, ankle edema.  GI: No dysphagia.  No other abdominal pain, diarrhea, constipation, rectal bleeding, or melena.  Occ heartburn.    :  Voids without dysuria, or incontience to completion.  ORTHO: No painful/swollen joints but various on /off sore.  No change occ sore neck or back.  No acute neck or back pain without recent injury.   NEURO: No dizziness, weakness of extremities.  Same numbness/parethesias.   PSYCH: Same memory loss.  Mood good; occ anxious, depressed but/and not suicidal.  Tolerated stress.     Results for orders placed or performed during the hospital  encounter of 03/03/17   Urine Culture   Result Value Ref Range    Urine Culture <10,000 CFU/mL Gram Negative Bacilli (A)     Urine Culture <10,000 CFU/mL Mixed Gram Positive Laura (A)    Urinalysis With / Culture If Indicated   Result Value Ref Range    Color, UA Yellow Yellow, Straw    Appearance, UA Clear Clear    pH, UA 8.0 5.0 - 8.0    Specific Gravity, UA 1.013 1.005 - 1.030    Glucose, UA Negative Negative    Ketones, UA Negative Negative    Bilirubin, UA Negative Negative    Blood, UA Trace (A) Negative    Protein, UA 30 mg/dL (1+) (A) Negative    Leuk Esterase, UA Small (1+) (A) Negative    Nitrite, UA Negative Negative    Urobilinogen, UA 0.2 E.U./dL 0.2 - 1.0 E.U./dL   Urinalysis, Microscopic Only   Result Value Ref Range    RBC, UA 3-5 (A) None Seen /HPF    WBC, UA 13-20 (A) None Seen /HPF    Bacteria, UA None Seen None Seen /HPF    Squamous Epithelial Cells, UA None Seen None Seen, 0-2 /HPF    Hyaline Casts, UA None Seen None Seen /LPF    Methodology Automated Microscopy        Lab Results   Component Value Date    HGBA1C 5.3 05/05/2016    HGBA1C 5.3 09/30/2015    HGBA1C 5.5 04/10/2015       Lab Results   Component Value Date     02/27/2017     06/08/2016     05/10/2016    K 4.7 02/27/2017    K 4.0 06/08/2016    K 4.4 05/10/2016     02/27/2017    CL 98 06/08/2016     05/10/2016    CO2 26.0 02/27/2017    CO2 31 06/08/2016    CO2 26 05/10/2016    GLUCOSE 124 (H) 06/08/2016    GLUCOSE 91 05/10/2016    GLUCOSE 94 05/09/2016    BUN 20 02/27/2017    BUN 19 06/08/2016    BUN 26 (H) 05/10/2016    CREATININE 0.74 02/27/2017    CREATININE 0.76 06/08/2016    CREATININE 0.72 05/10/2016    CALCIUM 9.6 02/27/2017    CALCIUM 9.9 06/08/2016    CALCIUM 9.6 05/10/2016    EGFRCLEARA 75 06/08/2016    EGFRCLEARA 79 05/10/2016    EGFRCLEARA 75 05/09/2016       No results found for: PSA     Lab Results:  CBC:    Lab Results - Last 18 Months  Lab Units 02/27/17  0957 06/08/16  2135 05/10/16  0534  "05/09/16  0411 05/08/16  0516 05/07/16  0440 05/06/16  0431   WBC 10*3/mm3 5.30 12.61* 7.36 7.72 7.51 7.86 7.75   HEMATOCRIT % 46.5 42.9 41.0 38.8 39.8 39.0 39.6     CMP:    Lab Results - Last 18 Months  Lab Units 02/27/17  0957 06/08/16  2135 05/10/16  0534 05/09/16 0411 05/08/16  0516 05/07/16  0440 05/06/16  0431   SODIUM mmol/L 139 141 141 141 141 140 139   CHLORIDE mmol/L 103 98 105 107 108 107 108   TOTAL CO2, ARTERIAL mmol/L 26.0  --   --   --   --   --   --    TOTAL CO2 mmol/L  --  31 26 26 25 26 25   BUN mg/dL 20 19 26* 26* 27* 26* 26*   CREATININE mg/dL 0.74 0.76 0.72 0.76 0.78 0.80 0.76   EGFR IF NONAFRICN AM mL/min/1.73 77  --   --   --   --   --   --    EGFR IF AFRICN AM mL/min/1.73 93  --   --   --   --   --   --    CALCIUM mg/dL 9.6 9.9 9.6 9.3 9.2 9.1 9.2     THYROID:    Lab Results - Last 18 Months  Lab Units 02/27/17  0957 05/04/16  1757   TSH mIU/mL 1.300 1.85     A1C:    Lab Results - Last 18 Months  Lab Units 05/05/16  0432   HEMOGLOBIN A1C % 5.3       Objective   /68 (BP Location: Right arm, Patient Position: Sitting, Cuff Size: Large Adult)  Pulse 56  Temp 98.3 °F (36.8 °C) (Oral)   Resp 18  Ht 60\" (152.4 cm)  Wt 156 lb (70.8 kg)  SpO2 98%  BMI 30.47 kg/m2    Physical Exam  GENERAL:  Well nourished/developed in no acute distress. Obese   SKIN: Turgor excellent, without wound, rash, lesion.  HEENT: Normal cephalic without trauma.  Pupils equal round reactive to light. Extraocular motions full without nystagmus. Oral cavity without growths, exudates, and moist.  Posterior pharnyx without mass, obstruction, reddness.  No thyroidmegaly, mass, tenderness, lymphadenopathy and supple.  CV: Regular rhythm.  No murmur, gallop,  edema. Posterior pulses intact.  No carotid bruits.  CHEST: No chest wall tenderness or mass.   LUNGS: Symmetric motion with clear to auscultation.    ABD: Soft, nontender without mass.   ORTHO: Symmetric extremities without swelling/point tenderness.  Full gross " range of motion.  Walking with assistance walker.  NEURO: CN 2-12 grossly intact.  Symmetric facies.  UE/LE   2-3/5 strength throughout.  L hemiplegia/focal use extremities. Speech slightly dysarthric.    PSYCH: Oriented x 3.  Pleasant calm, well kept.  Purposeful/directed conservation with intact short/long gross memory.     Assessment/Plan     1. Chronic anticoagulation-ASA 81/DM; a fib deferred/fall risk  So not an issue for ? surgery  - CT Abdomen Pelvis Without Contrast  - US Renal Bilateral  - Comprehensive metabolic panel  - Lipid panel  - CBC and Differential  - T4  - TSH  - Vitamin D 25 Hydroxy  - Hemoglobin A1c  - UA / M With / Rflx Culture([facility] ONLY)  - MicroAlbumin, Urine, Random    2. Abnormal x-ray-consider repeat  L kidney  - CT Abdomen Pelvis Without Contrast  - US Renal Bilateral  - Comprehensive metabolic panel  - Lipid panel  - CBC and Differential  - T4  - TSH  - Vitamin D 25 Hydroxy  - Hemoglobin A1c  - UA / M With / Rflx Culture([facility] ONLY)  - MicroAlbumin, Urine, Random    3. Abdominal pain, unspecified location  Some suggestion gallbladder  - CT Abdomen Pelvis Without Contrast  - US Renal Bilateral  - Comprehensive metabolic panel  - Lipid panel  - CBC and Differential  - T4  - TSH  - Vitamin D 25 Hydroxy  - Hemoglobin A1c  - UA / M With / Rflx Culture([facility] ONLY)  - MicroAlbumin, Urine, Random    4. Chronic ischemic right MCA stroke    - CT Abdomen Pelvis Without Contrast  - US Renal Bilateral  - Comprehensive metabolic panel  - Lipid panel  - CBC and Differential  - T4  - TSH  - Vitamin D 25 Hydroxy  - Hemoglobin A1c  - UA / M With / Rflx Culture([facility] ONLY)  - MicroAlbumin, Urine, Random    5. Gastroesophageal reflux disease without esophagitis  - CT Abdomen Pelvis Without Contrast  - US Renal Bilateral  - Comprehensive metabolic panel  - Lipid panel  - CBC and Differential  - T4  - TSH  - Vitamin D 25 Hydroxy  - Hemoglobin A1c  - UA / M With / Rflx Culture([facility] ONLY)  -  MicroAlbumin, Urine, Random    6. Essential hypertension  controlled  - CT Abdomen Pelvis Without Contrast  - US Renal Bilateral  - Comprehensive metabolic panel  - Lipid panel  - CBC and Differential  - T4  - TSH  - Vitamin D 25 Hydroxy  - Hemoglobin A1c  - UA / M With / Rflx Culture([facility] ONLY)  - MicroAlbumin, Urine, Random    7. Other cardiac arrhythmia  A fib/paroxysmal  - CT Abdomen Pelvis Without Contrast  - US Renal Bilateral  - Comprehensive metabolic panel  - Lipid panel  - CBC and Differential  - T4  - TSH  - Vitamin D 25 Hydroxy  - Hemoglobin A1c  - UA / M With / Rflx Culture([facility] ONLY)  - MicroAlbumin, Urine, Random    8. Congestive heart failure, unspecified congestive heart failure chronicity, unspecified congestive heart failure type  compensated  - CT Abdomen Pelvis Without Contrast  - US Renal Bilateral  - Comprehensive metabolic panel  - Lipid panel  - CBC and Differential  - T4  - TSH  - Vitamin D 25 Hydroxy  - Hemoglobin A1c  - UA / M With / Rflx Culture([facility] ONLY)  - MicroAlbumin, Urine, Random    9. Unspecified vitamin D deficiency  - Vitamin D 25 Hydroxy    10. Elevated fasting glucose  watched  - Hemoglobin A1c  - MicroAlbumin, Urine, Random      Rx: reviewed.  Any other changes above and:   LAB: reviewed/above.  Orders above and:     There are no Patient Instructions on file for this visit.    Follow up: Return for 1) Dr Sevilla as planned 1.10.18; lab before or during.  Future Appointments  Date Time Provider Department Center   9/12/2017 9:00 AM PAD US 2 BH PAD US PAD   9/12/2017 9:30 AM PAD CT 2  PAD CAT PAD   1/10/2018 9:00 AM Alfonso Treviño MD [facility] CD PAD None   2/6/2018 9:40 AM CHRISTIANE Gaxiola [facility] N PAD None

## 2017-09-20 NOTE — DISCHARGE INSTRUCTIONS
DAY OF SURGERY INSTRUCTIONS        YOUR SURGEON: Dr. Luna    PROCEDURE: Gallbladder    DATE OF SURGERY: September 22, 2017    ARRIVAL TIME: AS DIRECTED BY OFFICE    DAY OF SURGERY TAKE ONLY THESE MEDICATIONS: Digoxin and Metoprolol         BEFORE YOU COME TO THE HOSPITAL  (Pre-op instructions)  • Do not eat, drink, smoke or chew gum after midnight the night before surgery.  This also includes no mints.  • Morning of surgery take only the medicines you have been instructed with a sip of water unless otherwise instructed  by your physician.  • Do not shave, wear makeup or dark nail polish.  • Remove all jewelry including rings.  • Leave anything you consider valuable at home.  • Leave your suitcase in the car until after your surgery.  • Bring the following with you if applicable:  o Picture ID and insurance, Medicare or Medicaid cards  o Co-pay/deductible required by insurance (cash, check, credit card)  o Copy of advance directive, living will or power-of- documents if not brought to PAT  o CPAP or BIPAP mask and tubing  o Relaxation aids (MP3 player, book, magazine)  • On the day of surgery check in at registration located at the main entrance of the hospital.       Outpatient Surgery Guidelines, Adult  Outpatient procedures are those for which the person having the procedure is allowed to go home the same day as the procedure. Various procedures are done on an outpatient basis. You should follow some general guidelines if you will be having an outpatient procedure.  LET YOUR HEALTH CARE PROVIDER KNOW ABOUT:  · Any allergies you have.  · All medicines you are taking, including vitamins, herbs, eye drops, creams, and over-the-counter medicines.  · Previous problems you or members of your family have had with the use of anesthetics.  · Any blood disorders you have.  · Previous surgeries you have had.  · Medical conditions you have.  RISKS AND COMPLICATIONS  Your health care provider will discuss possible  risks and complications with you before surgery. Common risks and complications include:    · Problems due to the use of anesthetics.  · Blood loss and replacement (does not apply to minor surgical procedures).  · Temporary increase in pain due to surgery.  · Uncorrected pain or problems that the surgery was meant to correct.  · Infection.  · New damage.  BEFORE THE PROCEDURE  · Ask your health care provider about changing or stopping your regular medicines. You may need to stop taking certain medicines in the days or weeks before the procedure.  · Stop smoking at least 2 weeks before surgery. This lowers your risk for complications during and after surgery. Ask your health care provider for help with this if needed.  · Eat your usual meals and a light supper the day before surgery. Continue fluid intake. Do not drink alcohol.  · Do not eat or drink after midnight the night before your surgery.   · Arrange for someone to take you home and to stay with you for 24 hours after the procedure. Medicine given for your procedure may affect your ability to drive or to care for yourself.  · Call your health care provider's office if you develop an illness or problem that may prevent you from safely having your procedure.  AFTER THE PROCEDURE  After surgery, you will be taken to a recovery area, where your progress will be monitored. If there are no complications, you will be allowed to go home when you are awake, stable, and taking fluids well. You may have numbness around the surgical site. Healing will take some time. You will have tenderness at the surgical site and may have some swelling and bruising. You may also have some nausea.  HOME CARE INSTRUCTIONS  · Do not drive for 24 hours, or as directed by your health care provider. Do not drive while taking prescription pain medicines.  · Do not drink alcohol for 24 hours.  · Do not make important decisions or sign legal documents for 24 hours.  · You may resume a normal  diet and activities as directed.  · Do not lift anything heavier than 10 pounds (4.5 kg) or play contact sports until your health care provider says it is okay.  · Change your bandages (dressings) as directed.  · Only take over-the-counter or prescription medicines as directed by your health care provider.  · Follow up with your health care provider as directed.  SEEK MEDICAL CARE IF:  · You have increased bleeding (more than a small spot) from the surgical site.  · You have redness, swelling, or increasing pain in the wound.  · You see pus coming from the wound.  · You have a fever.  · You notice a bad smell coming from the wound or dressing.  · You feel lightheaded or faint.  · You develop a rash.  · You have trouble breathing.  · You develop allergies.  MAKE SURE YOU:  · Understand these instructions.  · Will watch your condition.  · Will get help right away if you are not doing well or get worse.     This information is not intended to replace advice given to you by your health care provider. Make sure you discuss any questions you have with your health care provider.     Document Released: 09/12/2002 Document Revised: 05/03/2016 Document Reviewed: 05/22/2014  [employer] Interactive Patient Education ©2016 [employer]       Fall Prevention in Hospitals, Adult  As a hospital patient, your condition and the treatments you receive can increase your risk for falls. Some additional risk factors for falls in a hospital include:  · Being in an unfamiliar environment.  · Being on bed rest.  · Your surgery.  · Taking certain medicines.  · Your tubing requirements, such as intravenous (IV) therapy or catheters.  It is important that you learn how to decrease fall risks while at the hospital. Below are important tips that can help prevent falls.  SAFETY TIPS FOR PREVENTING FALLS  Talk about your risk of falling.  · Ask your health care provider why you are at risk for falling. Is it your medicine, illness, tubing  placement, or something else?  · Make a plan with your health care provider to keep you safe from falls.  · Ask your health care provider or pharmacist about side effects of your medicines. Some medicines can make you dizzy or affect your coordination.  Ask for help.  · Ask for help before getting out of bed. You may need to press your call button.  · Ask for assistance in getting safely to the toilet.  · Ask for a walker or cane to be put at your bedside. Ask that most of the side rails on your bed be placed up before your health care provider leaves the room.  · Ask family or friends to sit with you.  · Ask for things that are out of your reach, such as your glasses, hearing aids, telephone, bedside table, or call button.  Follow these tips to avoid falling:  · Stay lying or seated, rather than standing, while waiting for help.  · Wear rubber-soled slippers or shoes whenever you walk in the hospital.  · Avoid quick, sudden movements.  ¨ Change positions slowly.  ¨ Sit on the side of your bed before standing.  ¨ Stand up slowly and wait before you start to walk.  · Let your health care provider know if there is a spill on the floor.  · Pay careful attention to the medical equipment, electrical cords, and tubes around you.  · When you need help, use your call button by your bed or in the bathroom. Wait for one of your health care providers to help you.  · If you feel dizzy or unsure of your footing, return to bed and wait for assistance.  · Avoid being distracted by the TV, telephone, or another person in your room.  · Do not lean or support yourself on rolling objects, such as IV poles or bedside tables.     This information is not intended to replace advice given to you by your health care provider. Make sure you discuss any questions you have with your health care provider.     Document Released: 12/15/2001 Document Revised: 01/08/2016 Document Reviewed: 08/25/2013  [employer] Interactive Patient Education ©2016  [employer]       Surgical Site Infections FAQs  What is a Surgical Site Infection (SSI)?  A surgical site infection is an infection that occurs after surgery in the part of the body where the surgery took place. Most patients who have surgery do not develop an infection. However, infections develop in about 1 to 3 out of every 100 patients who have surgery.  Some of the common symptoms of a surgical site infection are:  · Redness and pain around the area where you had surgery  · Drainage of cloudy fluid from your surgical wound  · Fever  Can SSIs be treated?  Yes. Most surgical site infections can be treated with antibiotics. The antibiotic given to you depends on the bacteria (germs) causing the infection. Sometimes patients with SSIs also need another surgery to treat the infection.  What are some of the things that hospitals are doing to prevent SSIs?  To prevent SSIs, doctors, nurses, and other healthcare providers:  · Clean their hands and arms up to their elbows with an antiseptic agent just before the surgery.  · Clean their hands with soap and water or an alcohol-based hand rub before and after caring for each patient.  · May remove some of your hair immediately before your surgery using electric clippers if the hair is in the same area where the procedure will occur. They should not shave you with a razor.  · Wear special hair covers, masks, gowns, and gloves during surgery to keep the surgery area clean.  · Give you antibiotics before your surgery starts. In most cases, you should get antibiotics within 60 minutes before the surgery starts and the antibiotics should be stopped within 24 hours after surgery.  · Clean the skin at the site of your surgery with a special soap that kills germs.  What can I do to help prevent SSIs?  Before your surgery:  · Tell your doctor about other medical problems you may have. Health problems such as allergies, diabetes, and obesity could affect your surgery and your  treatment.  · Quit smoking. Patients who smoke get more infections. Talk to your doctor about how you can quit before your surgery.  · Do not shave near where you will have surgery. Shaving with a razor can irritate your skin and make it easier to develop an infection.  At the time of your surgery:  · Speak up if someone tries to shave you with a razor before surgery. Ask why you need to be shaved and talk with your surgeon if you have any concerns.  · Ask if you will get antibiotics before surgery.  After your surgery:  · Make sure that your healthcare providers clean their hands before examining you, either with soap and water or an alcohol-based hand rub.  · If you do not see your providers clean their hands, please ask them to do so.  · Family and friends who visit you should not touch the surgical wound or dressings.  · Family and friends should clean their hands with soap and water or an alcohol-based hand rub before and after visiting you. If you do not see them clean their hands, ask them to clean their hands.  What do I need to do when I go home from the hospital?  · Before you go home, your doctor or nurse should explain everything you need to know about taking care of your wound. Make sure you understand how to care for your wound before you leave the hospital.  · Always clean your hands before and after caring for your wound.  · Before you go home, make sure you know who to contact if you have questions or problems after you get home.  · If you have any symptoms of an infection, such as redness and pain at the surgery site, drainage, or fever, call your doctor immediately.  If you have additional questions, please ask your doctor or nurse.  Developed and co-sponsored by The Society for Healthcare Epidemiology of [place] (SHEA); Infectious Diseases Society of [place] (IDSA); American Hospital Association; Association for Professionals in Infection Control and Epidemiology (APIC); Centers for Disease  Control and Prevention (CDC); and The Joint Commission.     This information is not intended to replace advice given to you by your health care provider. Make sure you discuss any questions you have with your health care provider.     Document Released: 12/23/2014 Document Revised: 01/08/2016 Document Reviewed: 03/02/2016  [employer] Interactive Patient Education ©2016 [employer]       [facility]  CHG 4% Patient Instruction Sheet    Preparing the Skin Before Surgery  Preparing or “prepping” skin before surgery can reduce the risk of infection at the surgical site. To make the process easier,[facility] has chosen 4% Chlorhexidine Gluconate (CHG) antiseptic solution.   The steps below outline the prepping process and should be carefully followed.                                                                                                                                                      Prep the skin at the following time(s):                                                      We recommend you shower the night before surgery, and again the morning of surgery with the 4% CHG antiseptic solution using half of the bottle and a cloth each time.  Dress in clean clothes/sleepwear after showering.  See instructions below for application.          Do not apply any lotions or moisturizers.       Do not shave the area to be prepped for at least 2 days prior to surgery.    Clipping the hair may be done immediately prior to your surgery at the hospital    if needed.    Directions:  Thoroughly rinse your body with water.  Apply 4% CHG to a cloth and wash skin gently, paying special attention to the operative site.  Rinse again thoroughly.  Once you have begun using this product do not apply anything else to your skin. If itching or redness persists, rinse affected areas and discontinue use.    When using this product:  • Keep out of eyes, ears, and mouth.  • If solution should contact these areas, rinse out promptly  and thoroughly with water.  • For external use only.  • Do not use in genital area, on your face or head.      PATIENT/FAMILY/RESPONSIBLE PARTY VERBALIZES UNDERSTANDING OF ABOVE EDUCATION.  COPY OF PAIN SCALE GIVEN AND REVIEWED WITH VERBALIZED UNDERSTANDING.

## 2017-09-21 PROBLEM — K80.50 BILIARY COLIC: Status: ACTIVE | Noted: 2017-01-01

## 2017-09-21 NOTE — PLAN OF CARE
Problem: Patient Care Overview (Adult)  Goal: Plan of Care Review  Outcome: Ongoing (interventions implemented as appropriate)  New admission  - lap gb x4 g/t clean/dry/intact. Iv, dtv, clear diet & advance. Very drowsy post op. Family at  Bedside. Continue to monitor     09/21/17 1505   Coping/Psychosocial Response Interventions   Plan Of Care Reviewed With patient   Patient Care Overview   Progress improving       Goal: Adult Individualization and Mutuality  Outcome: Ongoing (interventions implemented as appropriate)  Goal: Discharge Needs Assessment    09/21/17 1505   Discharge Needs Assessment   Concerns To Be Addressed no discharge needs identified   Readmission Within The Last 30 Days no previous admission in last 30 days   Equipment Needed After Discharge none   Discharge Disposition still a patient   Current Health   Anticipated Changes Related to Illness none   Self-Care   Equipment Currently Used at Home walker, rolling   Living Environment   Transportation Available car;family or friend will provide         Problem: Perioperative Period (Adult)  Goal: Signs and Symptoms of Listed Potential Problems Will be Absent or Manageable (Perioperative Period)  Outcome: Ongoing (interventions implemented as appropriate)    09/21/17 1505   Perioperative Period   Problems Assessed (Perioperative Period) all   Problems Present (Perioperative Period) pain

## 2017-09-21 NOTE — ANESTHESIA PROCEDURE NOTES
Airway  Urgency: elective    Airway not difficult    General Information and Staff    Patient location during procedure: OR  CRNA: DEE AWAD    Indications and Patient Condition  Indications for airway management: airway protection    Preoxygenated: yes  Mask difficulty assessment: 0 - not attempted    Final Airway Details  Final airway type: endotracheal airway      Successful airway: ETT  Cuffed: yes   Successful intubation technique: direct laryngoscopy  Facilitating devices/methods: intubating stylet  Endotracheal tube insertion site: oral  Blade: Radha  Blade size: 3.5.  ETT size: 7.5 mm  Cormack-Lehane Classification: grade IIa - partial view of glottis  Placement verified by: chest auscultation and capnometry   Cuff volume (mL): 6  Measured from: lips  ETT to lips (cm): 22  Number of attempts at approach: 2    Additional Comments  First approach- good view, ETT in esophagus. Second approach, ETT easily visualized passing thru vocal cords. Intubated atraumatically. Suctioned out stomach with OG tube post-intubation

## 2017-09-21 NOTE — ANESTHESIA POSTPROCEDURE EVALUATION
Patient: Daphne Santos    Procedure Summary     Date Anesthesia Start Anesthesia Stop Room / Location    09/21/17 1030 1135  [facility] OR 06 / [facility] OR       Procedure Diagnosis Surgeon Provider    CHOLECYSTECTOMY LAPAROSCOPIC (N/A Abdomen) (BILIARY COLIC) MD Juanita Marroquin CRNA          Anesthesia Type: general  Last vitals  BP   (!) 195/58 (09/21/17 1131)    Temp   98.4 °F (36.9 °C) (09/21/17 1131)    Pulse   56 (09/21/17 1131)   Resp   16 (09/21/17 1131)    SpO2   100 % (09/21/17 1131)      Post Anesthesia Care and Evaluation    Patient location during evaluation: PACU  Patient participation: complete - patient participated  Level of consciousness: awake and alert  Pain management: adequate  Airway patency: patent  Anesthetic complications: No anesthetic complications    Cardiovascular status: acceptable  Respiratory status: acceptable  Hydration status: acceptable

## 2017-09-21 NOTE — PLAN OF CARE
Problem: Patient Care Overview (Adult)  Goal: Plan of Care Review  Outcome: Ongoing (interventions implemented as appropriate)    09/21/17 1207   Coping/Psychosocial Response Interventions   Plan Of Care Reviewed With patient   Patient Care Overview   Progress improving   Outcome Evaluation   Outcome Summary/Follow up Plan pain controlled with medications, meets criteria for discharge from PACU         Problem: Perioperative Period (Adult)  Goal: Signs and Symptoms of Listed Potential Problems Will be Absent or Manageable (Perioperative Period)  Outcome: Ongoing (interventions implemented as appropriate)

## 2017-09-21 NOTE — H&P
Sherry Luna MD [facility] History and Physical     Referring Provider: Sherry Luna MD    Patient Care Team:  Tre Sevilla MD as PCP - General  Tre Sevilla MD as PCP - Family Medicine  Alejandro Lujan MD as PCP - Claims Attributed  Alfonso Treivño MD as Cardiologist (Cardiology)    Chief complaint abdominal pain    Subjective .     History of present illness:  The patient is a 77 y.o. female who presents complaining of progressively worsening right upper quadrant pain with radiation to her back.  CT a/p and right upper quadrant ultrasound examinations were essentially within normal limits.  HIDA demonstrated an EF of 63%.  She did have some exacerbation of her symptoms with the cck.  Her symptoms were suggestive of biliary in nature, but radiographically she was still within normal limits.  EGD was then performed for further evaluation and it, too demonstrated normal findings.  She returns continuing to complain of pain.    Review of Systems    Review of Systems - General ROS: negative  ENT ROS: negative  Respiratory ROS: no cough, shortness of breath, or wheezing  Cardiovascular ROS: no chest pain or dyspnea on exertion  Gastrointestinal ROS: no abdominal pain, change in bowel habits, or black or bloody stools  Genito-Urinary ROS: no dysuria, trouble voiding, or hematuria  Dermatological ROS: negative   Breast ROS: negative for breast lumps  Hematological and Lymphatic ROS: negative  Musculoskeletal ROS: negative   Neurological ROS: no TIA or stroke symptoms    Psychological ROS: negative  Endocrine ROS: negative    History  Past Medical History:   Diagnosis Date   • Arthritis    • Atrial fibrillation    • Chest pain    • Hx of colonic polyps    • Hypertension    • Seizures    • Stroke 2015   • Syncope and collapse    • Tremor    • Ventricular tachycardia (paroxysmal)    ,   Past Surgical History:   Procedure Laterality Date   • CATARACT EXTRACTION     • COLONOSCOPY W/ POLYPECTOMY   04/13/2012    Small polyp at 40 cm repeat exam in 5 in years   • ENDOSCOPY  07/23/2008    Normal exam   • ENDOSCOPY N/A 8/24/2017    Procedure: ESOPHAGOGASTRODUODENOSCOPY WITH ANESTHESIA;  Surgeon: Rajendra Devlin MD;  Location: [facility] ENDOSCOPY;  Service:    • HYSTERECTOMY     • THYROIDECTOMY, PARTIAL     ,   Family History   Problem Relation Age of Onset   • Heart disease Mother    • Stroke Mother    • Heart failure Mother    • Colon polyps Mother    • Cancer Father    • No Known Problems Sister    • No Known Problems Brother    • No Known Problems Sister    • Colon cancer Neg Hx    ,   Social History   Substance Use Topics   • Smoking status: Never Smoker   • Smokeless tobacco: Never Used   • Alcohol use No   ,   No prescriptions prior to admission.    and Allergies:  Contrast dye and Nexium [esomeprazole]  No current facility-administered medications for this encounter.     Current Outpatient Prescriptions:   •  acetaminophen (TYLENOL) 325 MG tablet, Take 650 mg by mouth every night., Disp: , Rfl:   •  aspirin 81 MG EC tablet, Take 81 mg by mouth Daily. Last dose 9/19, Disp: , Rfl:   •  atorvastatin (LIPITOR) 40 MG tablet, TAKE ONE TABLET NIGHTLY, Disp: 90 tablet, Rfl: 1  •  calcium carbonate-vitamin d (CALCIUM 600+D) 600-400 MG-UNIT per tablet, Take 1 tablet by mouth 2 (two) times a day., Disp: , Rfl:   •  digoxin (LANOXIN) 125 MCG tablet, TAKE 1/2 TABLET DAILY, Disp: 15 tablet, Rfl: 5  •  fluticasone (FLONASE) 50 MCG/ACT nasal spray, USE 2 SPRAYS IN EACH NOSTRIL DAILY (Patient taking differently: USE 2 SPRAYS IN EACH NOSTRIL DAILY as needed), Disp: 16 mL, Rfl: 5  •  levETIRAcetam (KEPPRA) 1000 MG tablet, Take 1 tablet by mouth 2 (Two) Times a Day., Disp: 60 tablet, Rfl: 5  •  lisinopril (PRINIVIL,ZESTRIL) 2.5 MG tablet, TAKE ONE TABLET DAILY, Disp: 90 tablet, Rfl: 1  •  Magnesium 200 MG tablet, Take 1 tablet by mouth Daily., Disp: , Rfl:   •  metoprolol succinate XL (TOPROL-XL) 25 MG 24 hr tablet, TAKE ONE  TABLET DAILY, Disp: 90 tablet, Rfl: 1  •  Multiple Vitamins-Minerals (EYE VITAMINS PO), Take 1 tablet by mouth daily., Disp: , Rfl:   •  Multiple Vitamins-Minerals (MULTIVITAMIN ADULT PO), Take 1 tablet by mouth daily., Disp: , Rfl:   •  omeprazole (priLOSEC) 20 MG capsule, Take 1 capsule by mouth 2 (Two) Times a Day., Disp: 60 capsule, Rfl: 11  •  polyethylene glycol (MIRALAX) packet, Take 17 g by mouth daily., Disp: , Rfl:   •  potassium chloride (K-DUR,KLOR-CON) 20 MEQ CR tablet, TAKE ONE TABLET TWICE DAILY, Disp: 180 tablet, Rfl: 1  •  primidone (MYSOLINE) 50 MG tablet, Take 2 tablets by mouth 3 (Three) Times a Day., Disp: 180 tablet, Rfl: 5  •  propafenone (RHTHYMOL) 150 MG tablet, TAKE ONE TABLET THREE TIMES DAILY, Disp: 90 tablet, Rfl: 5    Objective     Vital Signs   Heart Rate:  [56] 56  Resp:  [16] 16  BP: (147)/(59) 147/59    Physical Exam:  General appearance - alert, well appearing, and in no distress  Mental status - alert, oriented to person, place, and time  Neck - supple, no significant adenopathy  Chest - clear to auscultation, no wheezes, rales or rhonchi, symmetric air entry  Heart - normal rate, regular rhythm, normal S1, S2, no murmurs, rubs, clicks or gallops  Abdomen - soft, nontender, nondistended, no masses or organomegaly  Neurological - alert, oriented, normal speech, no focal findings or movement disorder noted  Musculoskeletal - no joint tenderness, deformity or swelling  Extremities - peripheral pulses normal, no pedal edema, no clubbing or cyanosis    Results Review:     Lab Results (last 24 hours)     ** No results found for the last 24 hours. **        Imaging Results (last 24 hours)     ** No results found for the last 24 hours. **            Assessment/Plan       Biliary colic.  Her symptoms are very suggestive of biliary colic.  She will undergo laparoscopic cholecystectomy, possible cholangiogram, with possible need for conversion to open.  The risks, benefits, complications,  and possible alternatives were discussed with the patient who agreed to proceed.      Sherry Luna MD  09/21/17  6:57 AM

## 2017-09-21 NOTE — OP NOTE
Preoperative diagnosis:  Biliary colic  Postoperative diagnosis:  Same  Procedure:  Laparoscopic cholecystectomy  Surgeon:  Sherry Luna MD  Anesthesia:  Get loc  Ebl:  Minimal  Ivf:  See anesthesia notes  Indications:  The patient is a 77-year-old female who presents complaining of progressively worsening epigastric and right upper quadrant pain with nausea.  Right upper quadrant ultrasound demonstrated no abnormalities.  HIDA demonstrated ef of 63%.  She underwent a normal endoscopy.  Due to symptoms suggestive of biliary colic and low ef, she presents for laparoscopic cholecystectomy, possible cholangiogram, with possible need for conversion to open.  The risks, benefits, complications, and possible alternatives were discussed with the patient who agreed to proceed.  Description of procedure: the patient was laid supine on the operating room table.  The abdomen was prepped and draped.  The abdomen was entered with veress needle.  Trocars were placed.  The liver was grossly within normal limits.  There were adhesions from the greater omentum to the gallbladder.  These were taken down bluntly as well as with bovie electrocautery.  The fundus and infundibulum were grasped.  The cystic duct was skeletonized and identified.  Four clips were placed distally and one proximally and it was transected.  An endoloop was also placed on the distal cystic duct.  The cyst artery and branches were clipped proximally and distally and transected.  The gallbladder was freed from the liver bed with bovie electrocautery.  The gallbladder was placed in an endocatch bag and removed.  The epigastric port site was closed with 0 vicryl.  The skin was closed with 3-0 and 4-0 vicryl.  The sponge, needle, and instrument counts were correct.  Findings:  Mildly inflamed gallbladder  Complications:  None  Disposition:  Good to pacut

## 2017-09-21 NOTE — ANESTHESIA PREPROCEDURE EVALUATION
Anesthesia Evaluation     Patient summary reviewed          Airway   Mallampati: II  TM distance: >3 FB  Neck ROM: full  no difficulty expected  Dental    (+) upper dentures        Pulmonary    Cardiovascular   Exercise tolerance: poor (<4 METS)    ECG reviewed  Patient on routine beta blocker and Beta blocker given within 24 hours of surgery  Rhythm: irregular    (+) hypertension, dysrhythmias Atrial Fib, CHF, hyperlipidemia      Neuro/Psych  (+) seizures, CVA (left hemiplegia), syncope, tremors (essential), poor historian.,    GI/Hepatic/Renal/Endo    (+)  GERD, liver disease (fatty), hypothyroidism,     Musculoskeletal     Abdominal    Substance History      OB/GYN          Other   (+) arthritis                                     Anesthesia Plan    ASA 3     general   (Patient very poor historian. Most of history obtained from chart review)  intravenous induction   Anesthetic plan and risks discussed with patient.

## 2017-09-21 NOTE — PLAN OF CARE
Problem: Patient Care Overview (Adult)  Goal: Plan of Care Review  Outcome: Ongoing (interventions implemented as appropriate)    09/21/17 0958   Coping/Psychosocial Response Interventions   Plan Of Care Reviewed With patient   Patient Care Overview   Progress no change         Problem: Perioperative Period (Adult)  Goal: Signs and Symptoms of Listed Potential Problems Will be Absent or Manageable (Perioperative Period)  Outcome: Ongoing (interventions implemented as appropriate)    09/21/17 0958   Perioperative Period   Problems Assessed (Perioperative Period) hypothermia;physiologic stress response;situational response   Problems Present (Perioperative Period) none

## 2017-09-22 NOTE — PROGRESS NOTES
Continued Stay Note   [facility]     Patient Name: Daphne Santos  MRN: [id number]  Today's Date: 9/22/2017    Admit Date: 9/21/2017          Discharge Plan       09/22/17 1430    Case Management/Social Work Plan    Additional Comments RECEIVED ORDER FOR POSSIBLE SNF. PT IS CURRENTLY MEETING OUTPT STATUS AND DOES NOT HAVE A INPT STAY TO QUALIFY PT FOR SNF. PT CANNOT AFFORD TO PRIVATE PAY FOR SNF. AWAITING PT EVAL.       09/22/17 1132    Case Management/Social Work Plan    Plan RETURN TO [facility]    Patient/Family In Agreement With Plan yes              Discharge Codes     None            ISRAEL Doty

## 2017-09-22 NOTE — PROGRESS NOTES
Discharge Planning Assessment  [facility]     Patient Name: Daphne Santos  MRN: [id number]  Today's Date: 9/22/2017    Admit Date: 9/21/2017          Discharge Needs Assessment       09/22/17 1126    Living Environment    Lives With facility resident   [facility]    Living Arrangements assisted living    Home Accessibility no concerns    Stair Railings at Home none    Type of Financial/Environmental Concern none    Transportation Available family or friend will provide;car    Living Environment    Provides Primary Care For no one    Quality Of Family Relationships involved;helpful    Able to Return to Prior Living Arrangements yes    Discharge Needs Assessment    Concerns To Be Addressed denies needs/concerns at this time    Readmission Within The Last 30 Days no previous admission in last 30 days    Outpatient/Agency/Support Group Needs assisted living facility (specify)    Community Agency Name(S) [facility]    Anticipated Changes Related to Illness none    Equipment Currently Used at Home walker, rolling    Equipment Needed After Discharge none    Discharge Facility/Level Of Care Needs assisted living facility    Discharge Disposition still a patient    Discharge Planning Comments PT RESIDES AT [facility] AND PLANS TO RETURN THERE AT [facility]. PT HAS A WALKER THAT SHE USES. PT PCP IS DR RICE. DISCUSSED HH BUT PT NOT SURE IF SHE NEEDS HH. WILL FOLLOW. PT IS HOPING TO BE ABLE TO RETURN HOME TODAY            Discharge Plan       09/22/17 1132    Case Management/Social Work Plan    Plan RETURN TO [facility]    Patient/Family In Agreement With Plan yes        Discharge Placement     No information found                Demographic Summary     None            Functional Status     None            Psychosocial     None            Abuse/Neglect     None            Legal     None            Substance Abuse     None            Patient Forms     None          ISRAEL Doty

## 2017-09-22 NOTE — THERAPY EVALUATION
Acute Care - Physical Therapy Initial Evaluation  [facility]     Patient Name: Daphne Santos  : 1940  MRN: [id number]  Today's Date: 2017   Onset of Illness/Injury or Date of Surgery Date: 17  Date of Referral to PT: 17  Referring Physician: Dr. Luna      Admit Date: 2017     Visit Dx:    ICD-10-CM ICD-9-CM   1. Impaired mobility Z74.09 799.89   2. 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   • Wellness examination-done   • Anxiety   • Depression   • Congestive heart failure   • Obesity   • Fatty liver   • Carotid bruit last ?   • Gastroesophageal reflux disease   • Macular degeneration   • Hypothyroidism   • Hyperlipidemia   • Corns and callosities   • Cardiac arrhythmia-paroxysmal a fib   • Hypopotassemia   • Abnormal x-ray-consider repeat   • Epigastric pain   • HTN (hypertension), benign   • Laboratory test*   • Biliary colic     Past Medical History:   Diagnosis Date   • Arthritis    • Atrial fibrillation    • Chest pain    • Hx of colonic polyps    • Hypertension    • Seizures    • Stroke    • Syncope and collapse    • Tremor    • Ventricular tachycardia (paroxysmal)      Past Surgical History:   Procedure Laterality Date   • CATARACT EXTRACTION     • CHOLECYSTECTOMY WITH INTRAOPERATIVE CHOLANGIOGRAM N/A 2017    Procedure: CHOLECYSTECTOMY LAPAROSCOPIC;  Surgeon: Sherry Luna MD;  Location: [facility];  Service:    • COLONOSCOPY W/ POLYPECTOMY  2012    Small polyp at 40 cm repeat exam in 5 in years   • ENDOSCOPY  2008    Normal exam   • ENDOSCOPY N/A 2017    Procedure:  ESOPHAGOGASTRODUODENOSCOPY WITH ANESTHESIA;  Surgeon: Rajendra Devlin MD;  Location: [facility] ENDOSCOPY;  Service:    • HYSTERECTOMY     • THYROIDECTOMY, PARTIAL            PT ASSESSMENT (last 72 hours)      PT Evaluation       09/22/17 1350 09/22/17 1126    Rehab Evaluation    Document Type evaluation   See MAR  -JORDIN     Subjective Information complains of;agree to therapy;weakness;pain  -JORDIN     General Information    Patient Profile Review yes  -JORDIN     Onset of Illness/Injury or Date of Surgery Date 09/21/17  -JORDIN     Referring Physician Dr. Luna  -JRODIN     General Observations Fowlers in bed, alert and with confusion.  -JORDIN     Pertinent History Of Current Problem Worsening R upper quadrant pain with radiation to her back. Dx: Biliary colic s/p 9/21 Lap. Cholecystectomy.   -JORDIN     Precautions/Limitations fall precautions  -JORDIN     Prior Level of Function independent:;dressing;feeding;bed mobility;transfer;gait;mod assist:;bathing   ambulates with a rollator walker  -JORDIN     Equipment Currently Used at Home lift device;walker, rolling;shower chair;hospital bed  -JORDIN (r) AB (t) JORDIN (c) walker, rolling  -LJ    Plans/Goals Discussed With patient and family;agreed upon   pt/sister  -JORDIN     Risks Reviewed patient and family:;LOB;nausea/vomiting;dizziness;increased discomfort;change in vital signs;lines disloged  -JORDIN     Benefits Reviewed patient and family:;improve function;increase independence;increase strength;increase balance;decrease pain;increase knowledge;improve skin integrity  -JORDIN     Barriers to Rehab medically complex;previous functional deficit;cognitive status;physical barrier  -JORDIN     Living Environment    Lives With facility resident   [facility]  -JORDIN facility resident   [facility]  -KEM    Living Arrangements independent living facility   with some assistance ([facility])  -JORDIN (r) AB (t) JORDIN (c) assisted living  -LJ    Home Accessibility  no concerns  -LJ    Stair Railings at Home  none  -LJ    Type of  Financial/Environmental Concern  none  -    Transportation Available  family or friend will provide;car  -    Clinical Impression    Date of Referral to PT 09/21/17  -JORDIN     PT Diagnosis impaired mobility, weakness  -JORDIN     Functional Level At Time Of Evaluation Min gait ~ 125 ft. with rollator walker.  -JORDIN     Patient/Family Goals Statement Go to rehab, if needed  -JORDIN     Criteria for Skilled Therapeutic Interventions Met yes;treatment indicated  -JORDIN     Impairments Found (describe specific impairments) arousal, attention, and cognition;gait, locomotion, and balance;posture  -JORDIN     Rehab Potential good, to achieve stated therapy goals  -JORDIN     Predicted Duration of Therapy Intervention (days/wks) until d/c  -JORDIN     Pain Assessment    Pain Assessment 0-10  -JORDIN (r) AB (t) JORDIN (c)     Pain Score 5  -JORDIN (r) AB (t) JORDIN (c)     Pain Type Acute pain  -JORDIN (r) AB (t) JORDIN (c)     Pain Location Abdomen  -JORDIN (r) AB (t) JORDIN (c)     Pain Descriptors Sore  -JORDIN (r) AB (t) JORDIN (c)     Pain Intervention(s) Repositioned;Ambulation/increased activity  -JORDIN     Response to Interventions tolerated  -JORDIN     Vision Assessment/Intervention    Visual Impairment WFL   with macular degeneration  -JORDIN (r) AB (t) JORDIN (c)     Cognitive Assessment/Intervention    Current Cognitive/Communication Assessment functional   Some confusion during eval, repeats herself at times  -JORDIN     Orientation Status oriented to;person;place;situation  -JORDIN     Follows Commands/Answers Questions able to follow multi-step instructions;100% of the time;needs cueing  -JORDIN     Personal Safety decreased awareness, need for assist;decreased awareness, need for safety  -JORDIN     Personal Safety Interventions fall prevention program maintained;gait belt;muscle strengthening facilitated;nonskid shoes/slippers when out of bed  -JORDIN     ROM (Range of Motion)    General ROM Detail R UE/LE AROM WFL, L LE AROM 25%, L shoulder AROM impaired ~ 50%, L elbow/hand AROM impaired ~ 25%.  -JORDIN      MMT (Manual Muscle Testing)    General MMT Assessment Detail R UE/LE 4/5, L elbow functionally 3+/5, L LE functionally 3+/5.  -JORDIN     Bed Mobility, Assessment/Treatment    Bed Mobility, Assistive Device head of bed elevated;bed rails  -JORDIN     Bed Mob, Supine to Sit, [place] verbal cues required;moderate assist (50% patient effort);2 person assist required  -JORDIN     Bed Mobility, Safety Issues cognitive deficits limit understanding;decreased use of arms for pushing/pulling;decreased use of legs for bridging/pushing;impaired trunk control for bed mobility  -JORDIN     Bed Mobility, Impairments strength decreased;impaired balance;pain  -JORDIN     Transfer Assessment/Treatment    Transfers, Sit-Stand [place] verbal cues required;minimum assist (75% patient effort);2 person assist required  -JORDIN     Transfers, Stand-Sit [place] verbal cues required;minimum assist (75% patient effort);2 person assist required  -JORDIN     Transfers, Sit-Stand-Sit, Assist Device --   rollator walker  -JORDIN     Toilet Transfer, [place] verbal cues required;minimum assist (75% patient effort);2 person assist required  -JORDIN     Toilet Transfer, Assistive Device --   BSC, rollator walker. Pt. completed toilet hygiene  -JORDIN     Transfer, Safety Issues step length decreased  -JORDIN     Transfer, Impairments strength decreased;impaired balance;pain  -JORDIN     Gait Assessment/Treatment    Gait, [place] Level verbal cues required;minimum assist (75% patient effort)  -JORDIN     Gait, Assistive Device rollator  -JORDIN     Gait, Distance (Feet) 125  -JORDIN     Gait, Gait Pattern Analysis swing-to gait  -JORDIN     Gait, Gait Deviations left:;paresh decreased;step length decreased;forward flexed posture;decreased heel strike   drags her L foot at times  -JORDIN     Gait, Safety Issues step length decreased  -JORDIN     Gait, Impairments strength decreased;impaired balance;pain  -JORDIN     Motor Skills/Interventions    Additional Documentation Balance Skills Training  (Group)  -JORDIN     Balance Skills Training    Sitting-Level of Assistance Minimum assistance  -JORDIN     Sitting-Balance Support Right upper extremity supported;Left upper extremity supported;Feet supported  -JORDIN     Standing-Level of Assistance Minimum assistance  -JORDIN     Static Standing Balance Support assistive device  -JORDIN     Gait Balance-Level of Assistance Minimum assistance  -JORDIN     Gait Balance Support assistive device  -JORDIN     Positioning and Restraints    Pre-Treatment Position in bed  -JORDIN     Post Treatment Position chair  -JORDIN     In Chair notified nsg;reclined;call light within reach;encouraged to call for assist;with family/caregiver  -JORDIN       09/21/17 1505 09/21/17 1438    General Information    Equipment Currently Used at Home walker, rolling  -MC walker, rolling  -CS    Living Environment    Lives With  facility resident  -CS    Living Arrangements  extended care facility  -CS    Home Accessibility  no concerns  -CS    Stair Railings at Home  none  -CS    Type of Financial/Environmental Concern  none  -CS    Transportation Available car;family or friend will provide  - car;family or friend will provide  -CS      09/20/17 1152       General Information    Equipment Currently Used at Home none  -TP     Living Environment    Lives With other (see comments)   facility  -TP     Living Arrangements assisted living;extended care facility   [facility]  -TP     Home Accessibility no concerns  -TP     Stair Railings at Home none  -TP     Type of Financial/Environmental Concern none  -TP     Transportation Available car  -TP       User Key  (r) = Recorded By, (t) = Taken By, (c) = Cosigned By    Initials Name Provider Type    JORDIN Valdez, PT DPT Physical Therapist    TP Anna Price, RN Registered Nurse    JEANNA Reddy, RN Registered Nurse    VICENTE Moseley, RN Registered Nurse    KEM Emanuel, [place]     AB Amanda Curry, PT Student PT Student          Physical Therapy  Education     Title: PT OT SLP Therapies (Active)     Topic: Physical Therapy (Active)     Point: Mobility training (Done)    Learning Progress Summary    Learner Readiness Method Response Comment Documented by Status   Patient Acceptance E YOEL,FROILAN,NR Educated pt./sister on progression of PT POC and benefits of activity.  09/22/17 1350 Done                      User Key     Initials Effective Dates Name Provider Type Discipline     08/02/16 -  Sourav Valdez, PT DPT Physical Therapist PT                PT Recommendation and Plan  Anticipated Discharge Disposition: skilled nursing facility, transitional care  Planned Therapy Interventions: bed mobility training, transfer training, gait training, balance training, home exercise program, patient/family education, postural re-education, strengthening  PT Frequency: daily, 2 times/day  Plan of Care Review  Plan Of Care Reviewed With: patient, sibling  Outcome Summary/Follow up Plan: PT eval completed. Pt. required mod assist x 2 to get OOB and min assist x 2 to stand and t/f to the BSC. Pt. then ambulated with min assist and rollator walker ~ 125 ft. Pt. with L side weakness from old CVA and generalized pain s/p sx. PT will work to progress pt's functional mobility, balance, and strenght. Would recommend d/c to SNF/TCU.           IP PT Goals       09/22/17 1350          Bed Mobility PT LTG    Bed Mobility PT LTG, Date Established 09/22/17  -JORDIN      Bed Mobility PT LTG, Time to Achieve by discharge  -JORDIN      Bed Mobility PT LTG, Activity Type all bed mobility  -JORDIN      Bed Mobility PT LTG, [place] Level supervision required  -JORDIN      Bed Mobility PT Goal  LTG, Assist Device bed rails  -JORDIN      Transfer Training PT LTG    Transfer Training PT LTG, Date Established 09/22/17  -JORDIN      Transfer Training PT LTG, Time to Achieve by discharge  -JORDIN      Transfer Training PT LTG, Activity Type bed to chair /chair to bed;sit to stand/stand to sit  -JORDIN      Transfer  Training PT LTG, [place] Level supervision required  -JORDIN      Transfer Training PT LTG, Assist Device walker, rolling  -JORDIN      Gait Training PT LTG    Gait Training Goal PT LTG, Date Established 09/22/17  -JORDIN      Gait Training Goal PT LTG, Time to Achieve by discharge  -JORDIN      Gait Training Goal PT LTG, [place] Level supervision required  -JORDIN      Gait Training Goal PT LTG, Assist Device walker, rolling  -JORDIN      Gait Training Goal PT LTG, Distance to Achieve 200  -JORDIN        User Key  (r) = Recorded By, (t) = Taken By, (c) = Cosigned By    Initials Name Provider Type    JORDIN Valdez, PT DPT Physical Therapist                Outcome Measures       09/22/17 1350          How much help from another person do you currently need...    Turning from your back to your side while in flat bed without using bedrails? 2  -JORDIN      Moving from lying on back to sitting on the side of a flat bed without bedrails? 2  -JORDIN      Moving to and from a bed to a chair (including a wheelchair)? 2  -JORDIN      Standing up from a chair using your arms (e.g., wheelchair, bedside chair)? 2  -JORDIN      Climbing 3-5 steps with a railing? 1  -JORDIN      To walk in hospital room? 2  -JORDIN      AM-PAC 6 Clicks Score 11  -JORDIN      Functional Assessment    Outcome Measure Options AM-PAC 6 Clicks Basic Mobility (PT)  -JORDIN        User Key  (r) = Recorded By, (t) = Taken By, (c) = Cosigned By    Initials Name Provider Type    JORDIN Valdez PT DPT Physical Therapist           Time Calculation:         PT Charges       09/22/17 1601          Time Calculation    Start Time 1350  -JORDIN      Stop Time 1450  -JORDIN      Time Calculation (min) 60 min  -JORDIN      PT Received On 09/22/17  -JORDIN      PT Goal Re-Cert Due Date 10/02/17  -JORDIN        User Key  (r) = Recorded By, (t) = Taken By, (c) = Cosigned By    Initials Name Provider Type    JORGE SamT Physical Therapist          Therapy Charges for Today     Code Description Service Date  Service Provider Modifiers Qty    01097391895 HC PT MOBILITY CURRENT 9/22/2017 Sourav Valdez, PT DPT GP, CL 1    80927391870 HC PT MOBILITY PROJECTED 9/22/2017 Sourav Valdez, PT DPT GP, CJ 1    [id number] HC PT EVAL MOD COMPLEXITY 4 9/22/2017 Sourav Valdez, PT DPT GP 1          PT G-Codes  Outcome Measure Options: AM-PAC 6 Clicks Basic Mobility (PT)  Score: 11  Functional Limitation: Mobility: Walking and moving around  Mobility: Walking and Moving Around Current Status (): At least 60 percent but less than 80 percent impaired, limited or restricted  Mobility: Walking and Moving Around Goal Status (): At least 20 percent but less than 40 percent impaired, limited or restricted      Sourav Valdez, PT DPT  9/22/2017

## 2017-09-22 NOTE — PLAN OF CARE
Problem: Patient Care Overview (Adult)  Goal: Plan of Care Review  Outcome: Ongoing (interventions implemented as appropriate)    09/22/17 1350   Coping/Psychosocial Response Interventions   Plan Of Care Reviewed With patient;sibling   Outcome Evaluation   Outcome Summary/Follow up Plan PT eval completed. Pt. required mod assist x 2 to get OOB and min assist x 2 to stand and t/f to the BSC. Pt. then ambulated with min assist and rollator walker ~ 125 ft. Pt. with L side weakness from old CVA and generalized pain s/p sx. PT will work to progress pt's functional mobility, balance, and strenght. Would recommend d/c to SNF/TCU.          Problem: Inpatient Physical Therapy  Goal: Bed Mobility Goal LTG- PT  Outcome: Ongoing (interventions implemented as appropriate)    09/22/17 1350   Bed Mobility PT LTG   Bed Mobility PT LTG, Date Established 09/22/17   Bed Mobility PT LTG, Time to Achieve by discharge   Bed Mobility PT LTG, Activity Type all bed mobility   Bed Mobility PT LTG, [place] Level supervision required   Bed Mobility PT Goal LTG, Assist Device bed rails       Goal: Transfer Training Goal 1 LTG- PT  Outcome: Ongoing (interventions implemented as appropriate)    09/22/17 1350   Transfer Training PT LTG   Transfer Training PT LTG, Date Established 09/22/17   Transfer Training PT LTG, Time to Achieve by discharge   Transfer Training PT LTG, Activity Type bed to chair /chair to bed;sit to stand/stand to sit   Transfer Training PT LTG, [place] Level supervision required   Transfer Training PT LTG, Assist Device walker, rolling       Goal: Gait Training Goal LTG- PT  Outcome: Ongoing (interventions implemented as appropriate)    09/22/17 1350   Gait Training PT LTG   Gait Training Goal PT LTG, Date Established 09/22/17   Gait Training Goal PT LTG, Time to Achieve by discharge   Gait Training Goal PT LTG, [place] Level supervision required   Gait Training Goal PT LTG, Assist Device walker, rolling   Gait  Training Goal PT LTG, Distance to Achieve 200

## 2017-09-22 NOTE — PLAN OF CARE
Problem: Patient Care Overview (Adult)  Goal: Plan of Care Review  Outcome: Ongoing (interventions implemented as appropriate)    09/22/17 0302   Coping/Psychosocial Response Interventions   Plan Of Care Reviewed With patient   Patient Care Overview   Progress no change       Goal: Adult Individualization and Mutuality  Outcome: Ongoing (interventions implemented as appropriate)  Goal: Discharge Needs Assessment  Outcome: Ongoing (interventions implemented as appropriate)    Problem: Perioperative Period (Adult)  Goal: Signs and Symptoms of Listed Potential Problems Will be Absent or Manageable (Perioperative Period)  Outcome: Ongoing (interventions implemented as appropriate)

## 2017-09-22 NOTE — PROGRESS NOTES
Sherry Luna MD FACS  Progress Note     LOS: 1 day   Patient Care Team:  Tre Sevilla MD as PCP - General  Tre Sevilla MD as PCP - Family Medicine  Alejandro Lujan MD as PCP - [facility] Attributed  Alfonso Treviño MD as Cardiologist (Cardiology)      Subjective     Interval History:      eating lunch.  Pain controlled.     Objective     Vital Signs  Temp:  [97.7 °F (36.5 °C)-98.9 °F (37.2 °C)] 98 °F (36.7 °C)  Heart Rate:  [56-91] 63  Resp:  [16] 16  BP: (112-154)/(41-64) 132/47    Physical Exam:  General appearance - alert, well appearing, and in no distress  Abdomen - soft, appropriately tender, clean      Results Review:    Lab Results (last 24 hours)     Procedure Component Value Units Date/Time    CBC (No Diff) [[id number]]  (Abnormal) Collected:  09/22/17 0548    Specimen:  Blood Updated:  09/22/17 0625     WBC 11.07 (H) 10*3/mm3      RBC 4.57 10*6/mm3      Hemoglobin 13.2 g/dL      Hematocrit 39.8 %      MCV 87.1 fL      MCH 28.9 pg      MCHC 33.2 g/dL      RDW 13.8 %      RDW-SD 43.4 fl      MPV 11.3 fL      Platelets 189 10*3/mm3     Comprehensive Metabolic Panel [[id number]]  (Abnormal) Collected:  09/22/17 0548    Specimen:  Blood Updated:  09/22/17 0647     Glucose 101 (H) mg/dL      BUN 21 mg/dL      Creatinine 0.82 mg/dL      Sodium 139 mmol/L      Potassium 3.5 mmol/L      Chloride 105 mmol/L      CO2 26.0 mmol/L      Calcium 8.7 mg/dL      Total Protein 5.6 (L) g/dL      Albumin 3.00 (L) g/dL      ALT (SGPT) 39 U/L      AST (SGOT) 34 U/L      Alkaline Phosphatase 45 U/L      Total Bilirubin 0.3 mg/dL      eGFR Non African Amer 68 mL/min/1.73      Globulin 2.6 gm/dL      A/G Ratio 1.2 g/dL      BUN/Creatinine Ratio 25.6 (H)     Anion Gap 8.0 mmol/L     Narrative:       The MDRD GFR formula is only valid for adults with stable renal function between ages 18 and 70.    Magnesium [[id number]]  (Normal) Collected:  09/22/17 0548    Specimen:  Blood Updated:  09/22/17 0647     Magnesium  1.9 mg/dL     Tissue Pathology Exam [[id number]] Collected:  09/21/17 1101    Specimen:  Tissue from Gallbladder Updated:  09/22/17 0800        Imaging Results (last 24 hours)     ** No results found for the last 24 hours. **            Assessment/Plan       D/c planning.  Will likely need a higher nursing care facility than her home at [facility].      Sherry Luna MD  09/22/17  1:07 PM

## 2017-09-23 NOTE — THERAPY TREATMENT NOTE
Acute Care - Physical Therapy Treatment Note  [facility]     Patient Name: Daphne Santos  : 1940  MRN: [id number]  Today's Date: 2017  Onset of Illness/Injury or Date of Surgery Date: 17  Date of Referral to PT: 17  Referring Physician: Dr. Luna    Admit Date: 2017    Visit Dx:    ICD-10-CM ICD-9-CM   1. Impaired mobility Z74.09 799.89   2. 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   • Wellness examination-done   • Anxiety   • Depression   • Congestive heart failure   • Obesity   • Fatty liver   • Carotid bruit last ?   • Gastroesophageal reflux disease   • Macular degeneration   • Hypothyroidism   • Hyperlipidemia   • Corns and callosities   • Cardiac arrhythmia-paroxysmal a fib   • Hypopotassemia   • Abnormal x-ray-consider repeat   • Epigastric pain   • HTN (hypertension), benign   • Laboratory test*   • Biliary colic               Adult Rehabilitation Note       17 1145          Rehab Assessment/Intervention    Discipline physical therapy assistant  -KJ      Document Type therapy note (daily note)  -KJ      Subjective Information agree to therapy  -KJ      Patient Effort, Rehab Treatment adequate  -KJ      Precautions/Limitations fall precautions  -KJ      Recorded by [KJ] Crista Thomas PTA      Pain Assessment    Pain Assessment 0-10  -KJ      Pain Score 4  -KJ      Pain Type Acute pain  -KJ      Pain Location Abdomen  -KJ      Pain Descriptors Sore  -KJ      Pain Intervention(s) Repositioned  -KJ      Response to Interventions tolerated  -KJ      Recorded by [KJ] Crista Thomas PTA      Bed Mobility,  Assessment/Treatment    Bed Mobility, Comment in chair  -KJ      Recorded by [KJ] Crista Thomas PTA      Transfer Assessment/Treatment    Transfers, Sit-Stand [place] verbal cues required;minimum assist (75% patient effort)  -KJ      Transfers, Stand-Sit [place] verbal cues required;minimum assist (75% patient effort)  -KJ      Transfer, Safety Issues step length decreased;weight-shifting ability decreased  -KJ      Transfer, Impairments strength decreased  -KJ      Recorded by [KJ] Crista Thomas PTA      Gait Assessment/Treatment    Gait, [place] Level verbal cues required;contact guard assist  -KJ      Gait, Assistive Device rollator  -KJ      Gait, Distance (Feet) 150  -KJ      Gait, Gait Deviations bilateral:;parseh decreased;forward flexed posture  -KJ      Gait, Safety Issues step length decreased  -KJ      Recorded by [KJ] Crista Thomas PTA      Positioning and Restraints    Pre-Treatment Position sitting in chair/recliner  -KJ      Post Treatment Position chair  -KJ      Recorded by [KJ] Crista Thomas PTA        User Key  (r) = Recorded By, (t) = Taken By, (c) = Cosigned By    Initials Name Effective Dates     Crista Thomas PTA 08/02/16 -                 IP PT Goals       09/22/17 1350          Bed Mobility PT LTG    Bed Mobility PT LTG, Date Established 09/22/17  -JORDIN      Bed Mobility PT LTG, Time to Achieve by discharge  -JORDIN      Bed Mobility PT LTG, Activity Type all bed mobility  -JORDIN      Bed Mobility PT LTG, [place] Level supervision required  -JORDIN      Bed Mobility PT Goal  LTG, Assist Device bed rails  -JORDIN      Transfer Training PT LTG    Transfer Training PT LTG, Date Established 09/22/17  -JORDIN      Transfer Training PT LTG, Time to Achieve by discharge  -JORDIN      Transfer Training PT LTG, Activity Type bed to chair /chair to bed;sit to stand/stand to sit  -JORDIN      Transfer Training PT LTG, [place] Level supervision required  -JORDIN      Transfer Training PT LTG,  "Assist Device walker, rolling  -JORDIN      Gait Training PT LTG    Gait Training Goal PT LTG, Date Established 09/22/17  -JORDIN      Gait Training Goal PT LTG, Time to Achieve by discharge  -JORDIN      Gait Training Goal PT LTG, [place] Level supervision required  -JORDIN      Gait Training Goal PT LTG, Assist Device walker, rolling  -JORDIN      Gait Training Goal PT LTG, Distance to Achieve 200  -JORDIN        User Key  (r) = Recorded By, (t) = Taken By, (c) = Cosigned By    Initials Name Provider Type    JORDIN Valdez, PT DPT Physical Therapist          Physical Therapy Education     Title: PT OT SLP Therapies (Resolved)     Topic: Physical Therapy (Resolved)     Point: Mobility training (Resolved)    Learning Progress Summary    Learner Readiness Method Response Comment Documented by Status   Patient Acceptance E FROILAN DIALLO,NR Educated pt./sister on progression of PT POC and benefits of activity. JORDIN 09/22/17 1350 Done                      User Key     Initials Effective Dates Name Provider Type Discipline    JORDIN 08/02/16 -  Sourav Valdez, PT DPT Physical Therapist PT                    PT Recommendation and Plan  Anticipated Discharge Disposition: skilled nursing facility, transitional care  Planned Therapy Interventions: bed mobility training, transfer training, gait training, balance training, home exercise program, patient/family education, postural re-education, strengthening  PT Frequency: daily, 2 times/day  Plan of Care Review  Plan Of Care Reviewed With: patient  Progress: progress toward functional goals as expected  Outcome Summary/Follow up Plan: PT tx completed. Pt sitting in chair, c/o \"everything!' Wants to go home. Min to stand, CG amb 150' with rollator.           Outcome Measures       09/22/17 1350          How much help from another person do you currently need...    Turning from your back to your side while in flat bed without using bedrails? 2  -JORDIN      Moving from lying on back to sitting on the side of " a flat bed without bedrails? 2  -JORDIN      Moving to and from a bed to a chair (including a wheelchair)? 2  -JORDIN      Standing up from a chair using your arms (e.g., wheelchair, bedside chair)? 2  -JORDIN      Climbing 3-5 steps with a railing? 1  -JORDIN      To walk in hospital room? 2  -JORDIN      AM-PAC 6 Clicks Score 11  -JORDIN      Functional Assessment    Outcome Measure Options AM-PAC 6 Clicks Basic Mobility (PT)  -JORDIN        User Key  (r) = Recorded By, (t) = Taken By, (c) = Cosigned By    Initials Name Provider Type    JORDIN Valdez, PT DPT Physical Therapist           Time Calculation:           PT G-Codes  Outcome Measure Options: AM-PAC 6 Clicks Basic Mobility (PT)  Score: 11  Functional Limitation: Mobility: Walking and moving around  Mobility: Walking and Moving Around Current Status (): At least 60 percent but less than 80 percent impaired, limited or restricted  Mobility: Walking and Moving Around Goal Status (): At least 20 percent but less than 40 percent impaired, limited or restricted    Crista Thomas, PTA  9/23/2017

## 2017-09-23 NOTE — DISCHARGE SUMMARY
Consults     Date and Time Order Name Status Description    9/22/2017 1306 Inpatient Consult to Internal Medicine        Sherry Luna MD FACS Discharge Summary    Date of Discharge:  9/23/2017    Discharge Diagnosis: s/p lap tonio    Presenting Problem/History of Present Illness  Biliary colic [K80.50]  Biliary colic [K80.50]     Hospital Course  Patient is a 77 y.o. female presented with biliary colic with history of multiple medical comorbidities.  She underwent lap tonio.  Her diet was advanced and she was ambulating with the help of her walker.  She will return to her assisted care facility.      Procedures Performed  Procedure(s):  CHOLECYSTECTOMY LAPAROSCOPIC       Consults:   Consults     Date and Time Order Name Status Description    9/22/2017 1306 Inpatient Consult to Internal Medicine            Condition on Discharge:  good    Vital Signs  Temp:  [98 °F (36.7 °C)-98.8 °F (37.1 °C)] 98.6 °F (37 °C)  Heart Rate:  [62-73] 62  Resp:  [16-20] 18  BP: (132-173)/(47-69) 173/61    Physical Exam:   See History and Physical found in chart.    Discharge Disposition  Home or Self Care    Discharge Medications   Daphne Santos   Home Medication Instructions SHANNAN:[id number]    Printed on:09/23/17 0943   Medication Information                      acetaminophen (TYLENOL) 325 MG tablet  Take 650 mg by mouth every night.             aspirin 81 MG EC tablet  Take 81 mg by mouth Daily. Last dose 9/19             atorvastatin (LIPITOR) 40 MG tablet  TAKE ONE TABLET NIGHTLY             calcium carbonate-vitamin d (CALCIUM 600+D) 600-400 MG-UNIT per tablet  Take 1 tablet by mouth 2 (two) times a day.             digoxin (LANOXIN) 125 MCG tablet  TAKE 1/2 TABLET DAILY             fluticasone (FLONASE) 50 MCG/ACT nasal spray  USE 2 SPRAYS IN EACH NOSTRIL DAILY             levETIRAcetam (KEPPRA) 1000 MG tablet  Take 1 tablet by mouth 2 (Two) Times a Day.             lisinopril (PRINIVIL,ZESTRIL) 2.5 MG tablet  TAKE ONE  TABLET DAILY             Magnesium 200 MG tablet  Take 1 tablet by mouth Daily.             metoprolol succinate XL (TOPROL-XL) 25 MG 24 hr tablet  TAKE ONE TABLET DAILY             Multiple Vitamins-Minerals (EYE VITAMINS PO)  Take 1 tablet by mouth daily.             Multiple Vitamins-Minerals (MULTIVITAMIN ADULT PO)  Take 1 tablet by mouth daily.             omeprazole (priLOSEC) 20 MG capsule  Take 1 capsule by mouth 2 (Two) Times a Day.             polyethylene glycol (MIRALAX) packet  Take 17 g by mouth daily.             potassium chloride (K-DUR,KLOR-CON) 20 MEQ CR tablet  TAKE ONE TABLET TWICE DAILY             primidone (MYSOLINE) 50 MG tablet  Take 2 tablets by mouth 3 (Three) Times a Day.             propafenone (RHTHYMOL) 150 MG tablet  TAKE ONE TABLET THREE TIMES DAILY                 Discharge Diet:     Activity at Discharge:     Follow-up Appointments  Future Appointments  Date Time Provider Department Center   1/10/2018 9:00 AM Alfonso Treviño MD [facility] None   2/6/2018 9:40 AM CHRISTIANE Gaxiola [facility] N [facility] None         Test Results Pending at Discharge   Order Current Status    Tissue Pathology Exam In process           Sherry Luna MD  09/23/17  9:43 AM

## 2017-09-23 NOTE — PLAN OF CARE
Problem: Patient Care Overview (Adult)  Goal: Plan of Care Review  Outcome: Ongoing (interventions implemented as appropriate)    09/23/17 0411   Coping/Psychosocial Response Interventions   Plan Of Care Reviewed With patient   Patient Care Overview   Progress no change   Outcome Evaluation   Outcome Summary/Follow up Plan Medicated x1 so far for pain. Slept well. Pt. walked in [place] with family, however when [place] [facility], pt. required 2-3 assist and was very unsteady.          Problem: Perioperative Period (Adult)  Goal: Signs and Symptoms of Listed Potential Problems Will be Absent or Manageable (Perioperative Period)  Outcome: Ongoing (interventions implemented as appropriate)

## 2017-09-23 NOTE — PLAN OF CARE
"Problem: Patient Care Overview (Adult)  Goal: Plan of Care Review  Outcome: Ongoing (interventions implemented as appropriate)    09/23/17 1333   Coping/Psychosocial Response Interventions   Plan Of Care Reviewed With patient   Patient Care Overview   Progress progress toward functional goals as expected   Outcome Evaluation   Outcome Summary/Follow up Plan PT tx completed. Pt sitting in chair, c/o \"everything!' Wants to go home. Min to stand, CG amb 150' with rollator.            "

## 2017-09-23 NOTE — PROGRESS NOTES
Sherry Luna MD FACS  Progress Note     LOS: 1 day   Patient Care Team:  Tre Sevilla MD as PCP - General  Tre Sevilla MD as PCP - Family Medicine  Alejandro Lujan MD as PCP - [facility]  Alfonso Treviño MD as Cardiologist (Cardiology)      Subjective     Interval History:      kimmy po.  Ambulating halls     Objective     Vital Signs  Temp:  [98 °F (36.7 °C)-98.8 °F (37.1 °C)] 98.6 °F (37 °C)  Heart Rate:  [62-73] 62  Resp:  [16-20] 18  BP: (132-173)/(47-69) 173/61    Physical Exam:  General appearance - alert, well appearing, and in no distress  Abdomen - soft, appropriately tender, clean      Results Review:    Lab Results (last 24 hours)     Procedure Component Value Units Date/Time    CBC (No Diff) [[id number]]  (Abnormal) Collected:  09/23/17 0532    Specimen:  Blood Updated:  09/23/17 0629     WBC 10.42 10*3/mm3      RBC 4.85 10*6/mm3      Hemoglobin 13.9 g/dL      Hematocrit 42.4 %      MCV 87.4 fL      MCH 28.7 pg      MCHC 32.8 (L) g/dL      RDW 13.6 %      RDW-SD 43.3 fl      MPV 11.8 fL      Platelets 197 10*3/mm3     Basic Metabolic Panel [[id number]]  (Normal) Collected:  09/23/17 0532    Specimen:  Blood Updated:  09/23/17 0636     Glucose 96 mg/dL      BUN 18 mg/dL      Creatinine 0.76 mg/dL      Sodium 141 mmol/L      Potassium 3.5 mmol/L      Chloride 105 mmol/L      CO2 27.0 mmol/L      Calcium 8.5 mg/dL      eGFR Non African Amer 74 mL/min/1.73      BUN/Creatinine Ratio 23.7     Anion Gap 9.0 mmol/L     Narrative:       The MDRD GFR formula is only valid for adults with stable renal function between ages 18 and 70.        Imaging Results (last 24 hours)     ** No results found for the last 24 hours. **            Assessment/Plan       home      Sherry Luna MD  09/23/17  9:42 AM

## 2017-09-24 NOTE — THERAPY DISCHARGE NOTE
Acute Care - Physical Therapy Discharge Summary  [facility]       Patient Name: Daphne Santos  : 1940  MRN: [id number]    Today's Date: 2017  Onset of Illness/Injury or Date of Surgery Date: 17    Date of Referral to PT: 17  Referring Physician: Dr. Luna      Admit Date: 2017      PT Recommendation and Plan    Visit Dx:    ICD-10-CM ICD-9-CM   1. Impaired mobility Z74.09 799.89   2. Biliary colic K80.50 574.20             Outcome Measures       17 1350          How much help from another person do you currently need...    Turning from your back to your side while in flat bed without using bedrails? 2  -JORDIN      Moving from lying on back to sitting on the side of a flat bed without bedrails? 2  -JORDIN      Moving to and from a bed to a chair (including a wheelchair)? 2  -JORDIN      Standing up from a chair using your arms (e.g., wheelchair, bedside chair)? 2  -JORDIN      Climbing 3-5 steps with a railing? 1  -JORDIN      To walk in hospital room? 2  -JORDIN      AM-PAC 6 Clicks Score 11  -JORDIN      Functional Assessment    Outcome Measure Options AM-PAC 6 Clicks Basic Mobility (PT)  -JORDIN        User Key  (r) = Recorded By, (t) = Taken By, (c) = Cosigned By    Initials Name Provider Type    JORDIN Valdez, PT DPT Physical Therapist                      IP PT Goals       17 0728 17 1350       Bed Mobility PT LTG    Bed Mobility PT LTG, Date Established  17  -JORDIN     Bed Mobility PT LTG, Time to Achieve  by discharge  -JORDIN     Bed Mobility PT LTG, Activity Type  all bed mobility  -JORDIN     Bed Mobility PT LTG, [place] Level  supervision required  -JORDIN     Bed Mobility PT Goal  LTG, Assist Device  bed rails  -JORDIN     Bed Mobility PT LTG, Date Goal Reviewed 17  -CW      Bed Mobility PT LTG, Outcome goal not met  -CW      Bed Mobility PT LTG, Reason Goal Not Met discharged from facility  -CW      Transfer Training PT LTG    Transfer Training PT LTG, Date Established  17   -JORDIN     Transfer Training PT LTG, Time to Achieve  by discharge  -JORDIN     Transfer Training PT LTG, Activity Type  bed to chair /chair to bed;sit to stand/stand to sit  -JORDIN     Transfer Training PT LTG, [place] Level  supervision required  -JORDIN     Transfer Training PT LTG, Assist Device  walker, rolling  -JORDIN     Transfer Training PT  LTG, Date Goal Reviewed 09/24/17  -CW      Transfer Training PT LTG, Outcome goal not met  -CW      Transfer Training PT LTG, Reason Goal Not Met discharged from facility  -CW      Gait Training PT LTG    Gait Training Goal PT LTG, Date Established  09/22/17  -JORDIN     Gait Training Goal PT LTG, Time to Achieve  by discharge  -JORDIN     Gait Training Goal PT LTG, [place] Level  supervision required  -JORDIN     Gait Training Goal PT LTG, Assist Device  walker, rolling  -JORDIN     Gait Training Goal PT LTG, Distance to Achieve  200  -JORDIN     Gait Training Goal PT LTG, Date Goal Reviewed 09/24/17  -CW      Gait Training Goal PT LTG, Outcome goal not met  -CW      Gait Training Goal PT LTG, Reason Goal Not Met discharged from facility  -CW        User Key  (r) = Recorded By, (t) = Taken By, (c) = Cosigned By    Initials Name Provider Type    JORDIN Valdez, PT DPT Physical Therapist    CW Zuleima Ardon, PTA Physical Therapy Assistant              PT Discharge Summary  Reason for Discharge: Discharge from facility  Outcomes Achieved: Refer to plan of care for updates on goals achieved  Discharge Destination: Home      Zuleima Ardon, LE   9/24/2017

## 2017-09-24 NOTE — PLAN OF CARE
Problem: Inpatient Physical Therapy  Goal: Bed Mobility Goal LTG- PT  Outcome: Unable to achieve outcome(s) by discharge Date Met:  09/24/17 09/24/17 0728   Bed Mobility PT LTG   Bed Mobility PT LTG, Date Goal Reviewed 09/24/17   Bed Mobility PT LTG, Outcome goal not met   Bed Mobility PT LTG, Reason Goal Not Met discharged from facility       Goal: Transfer Training Goal 1 LTG- PT  Outcome: Unable to achieve outcome(s) by discharge Date Met:  09/24/17 09/24/17 0728   Transfer Training PT LTG   Transfer Training PT LTG, Date Goal Reviewed 09/24/17   Transfer Training PT LTG, Outcome goal not met   Transfer Training PT LTG, Reason Goal Not Met discharged from facility       Goal: Gait Training Goal LTG- PT  Outcome: Unable to achieve outcome(s) by discharge Date Met:  09/24/17 09/24/17 0728   Gait Training PT LTG   Gait Training Goal PT LTG, Date Goal Reviewed 09/24/17   Gait Training Goal PT LTG, Outcome goal not met   Gait Training Goal PT LTG, Reason Goal Not Met discharged from facility

## 2017-09-26 NOTE — TELEPHONE ENCOUNTER
----- Message from Daphne Santos sent at 9/26/2017  7:38 AM CDT -----  Regarding: Test Results Question  Contact: [phone]  Daphne has noticed a little discomfort when she urinates.  When they took test before her surgery, I was wondering if there is a problem the the test results were showing.  Ana Lilia

## 2017-10-06 NOTE — TELEPHONE ENCOUNTER
----- Message from Daphne MARCIA Santos sent at 10/5/2017  9:15 PM CDT -----  Regarding: Prescription Question  Contact: [phone]  Can you get something for Daphne she has a bladder infection again.  She is complaining about it burning when she goes to the bathroom and her lower back is hurting.  She has been drinking Cranberry Juice with water.   I can't get her out there to the office tomorrow (Friday).  Ana Lilia

## 2017-11-07 NOTE — TELEPHONE ENCOUNTER
Pt's premarin cream was not covered by insurance but Estrace vag cream is covered Rx switched and called ELSA and Ana Lilia chung

## 2017-11-27 NOTE — TELEPHONE ENCOUNTER
UA/culture  Confirm willing/able to wait 48 hr till results OR if needing Rx while waiting (starting now runs the risk of having wrong abx and having to throw away/get another)

## 2017-11-27 NOTE — TELEPHONE ENCOUNTER
Ana Lilia called and states pt is having burning with urination and wanted to come get some labs [phone]

## 2017-12-05 NOTE — PROGRESS NOTES
Sister says she is still complaining of burning and frequency on urination. Was not treated. [facility]

## 2018-01-01 ENCOUNTER — APPOINTMENT (OUTPATIENT)
Dept: CT IMAGING | Facility: HOSPITAL | Age: 78
End: 2018-01-01

## 2018-01-01 ENCOUNTER — CLINICAL SUPPORT (OUTPATIENT)
Dept: FAMILY MEDICINE CLINIC | Facility: CLINIC | Age: 78
End: 2018-01-01

## 2018-01-01 ENCOUNTER — OFFICE VISIT (OUTPATIENT)
Dept: NEUROLOGY | Facility: CLINIC | Age: 78
End: 2018-01-01

## 2018-01-01 ENCOUNTER — OFFICE VISIT (OUTPATIENT)
Dept: FAMILY MEDICINE CLINIC | Facility: CLINIC | Age: 78
End: 2018-01-01

## 2018-01-01 ENCOUNTER — DOCUMENTATION (OUTPATIENT)
Dept: FAMILY MEDICINE CLINIC | Facility: CLINIC | Age: 78
End: 2018-01-01

## 2018-01-01 ENCOUNTER — OFFICE VISIT (OUTPATIENT)
Dept: CARDIOLOGY | Facility: CLINIC | Age: 78
End: 2018-01-01

## 2018-01-01 ENCOUNTER — OUTSIDE FACILITY SERVICE (OUTPATIENT)
Dept: FAMILY MEDICINE CLINIC | Facility: CLINIC | Age: 78
End: 2018-01-01

## 2018-01-01 ENCOUNTER — APPOINTMENT (OUTPATIENT)
Dept: GENERAL RADIOLOGY | Facility: HOSPITAL | Age: 78
End: 2018-01-01

## 2018-01-01 ENCOUNTER — HOSPITAL ENCOUNTER (INPATIENT)
Facility: HOSPITAL | Age: 78
LOS: 2 days | End: 2018-06-06
Attending: EMERGENCY MEDICINE | Admitting: FAMILY MEDICINE

## 2018-01-01 ENCOUNTER — TELEPHONE (OUTPATIENT)
Dept: FAMILY MEDICINE CLINIC | Facility: CLINIC | Age: 78
End: 2018-01-01

## 2018-01-01 ENCOUNTER — PATIENT MESSAGE (OUTPATIENT)
Dept: FAMILY MEDICINE CLINIC | Facility: CLINIC | Age: 78
End: 2018-01-01

## 2018-01-01 VITALS
RESPIRATION RATE: 24 BRPM | WEIGHT: 152.34 LBS | HEART RATE: 157 BPM | BODY MASS INDEX: 29.75 KG/M2 | OXYGEN SATURATION: 93 % | TEMPERATURE: 99.7 F | SYSTOLIC BLOOD PRESSURE: 120 MMHG | DIASTOLIC BLOOD PRESSURE: 63 MMHG

## 2018-01-01 VITALS
WEIGHT: 153 LBS | RESPIRATION RATE: 18 BRPM | HEART RATE: 60 BPM | OXYGEN SATURATION: 97 % | DIASTOLIC BLOOD PRESSURE: 76 MMHG | BODY MASS INDEX: 30.04 KG/M2 | SYSTOLIC BLOOD PRESSURE: 128 MMHG | HEIGHT: 60 IN | TEMPERATURE: 98.5 F

## 2018-01-01 VITALS
WEIGHT: 151 LBS | RESPIRATION RATE: 16 BRPM | HEIGHT: 60 IN | SYSTOLIC BLOOD PRESSURE: 140 MMHG | BODY MASS INDEX: 29.64 KG/M2 | DIASTOLIC BLOOD PRESSURE: 78 MMHG | HEART RATE: 53 BPM | OXYGEN SATURATION: 98 %

## 2018-01-01 VITALS
OXYGEN SATURATION: 98 % | HEIGHT: 60 IN | WEIGHT: 151 LBS | DIASTOLIC BLOOD PRESSURE: 60 MMHG | BODY MASS INDEX: 29.64 KG/M2 | SYSTOLIC BLOOD PRESSURE: 142 MMHG | HEART RATE: 57 BPM

## 2018-01-01 DIAGNOSIS — K76.0 FATTY LIVER: ICD-10-CM

## 2018-01-01 DIAGNOSIS — R00.1 BRADYCARDIA: Chronic | ICD-10-CM

## 2018-01-01 DIAGNOSIS — E78.2 MIXED HYPERLIPIDEMIA: Chronic | ICD-10-CM

## 2018-01-01 DIAGNOSIS — R29.6 FREQUENT FALLS: ICD-10-CM

## 2018-01-01 DIAGNOSIS — M25.569 KNEE PAIN, UNSPECIFIED CHRONICITY, UNSPECIFIED LATERALITY: ICD-10-CM

## 2018-01-01 DIAGNOSIS — R79.9 ABNORMAL FINDING OF BLOOD CHEMISTRY: ICD-10-CM

## 2018-01-01 DIAGNOSIS — I48.91 ATRIAL FIBRILLATION, UNSPECIFIED TYPE (HCC): Primary | ICD-10-CM

## 2018-01-01 DIAGNOSIS — I10 HTN (HYPERTENSION), BENIGN: ICD-10-CM

## 2018-01-01 DIAGNOSIS — I48.0 PAROXYSMAL ATRIAL FIBRILLATION (HCC): Chronic | ICD-10-CM

## 2018-01-01 DIAGNOSIS — F41.9 ANXIETY: Chronic | ICD-10-CM

## 2018-01-01 DIAGNOSIS — I50.9 CONGESTIVE HEART FAILURE, UNSPECIFIED CONGESTIVE HEART FAILURE CHRONICITY, UNSPECIFIED CONGESTIVE HEART FAILURE TYPE: ICD-10-CM

## 2018-01-01 DIAGNOSIS — E03.9 HYPOTHYROIDISM, UNSPECIFIED TYPE: Chronic | ICD-10-CM

## 2018-01-01 DIAGNOSIS — I10 ESSENTIAL HYPERTENSION: Chronic | ICD-10-CM

## 2018-01-01 DIAGNOSIS — I69.30 CHRONIC ISCHEMIC RIGHT MCA STROKE: ICD-10-CM

## 2018-01-01 DIAGNOSIS — I10 ESSENTIAL HYPERTENSION: Primary | Chronic | ICD-10-CM

## 2018-01-01 DIAGNOSIS — G40.919 INTRACTABLE EPILEPSY WITHOUT STATUS EPILEPTICUS, UNSPECIFIED EPILEPSY TYPE (HCC): ICD-10-CM

## 2018-01-01 DIAGNOSIS — Z79.01 ANTICOAGULATED: ICD-10-CM

## 2018-01-01 DIAGNOSIS — I63.9 CEREBROVASCULAR ACCIDENT (CVA), UNSPECIFIED MECHANISM (HCC): Primary | ICD-10-CM

## 2018-01-01 DIAGNOSIS — I69.90 LATE EFFECTS OF CVA (CEREBROVASCULAR ACCIDENT): Chronic | ICD-10-CM

## 2018-01-01 DIAGNOSIS — R30.0 DYSURIA: ICD-10-CM

## 2018-01-01 DIAGNOSIS — G40.909 SEIZURE DISORDER (HCC): ICD-10-CM

## 2018-01-01 DIAGNOSIS — K21.9 GASTROESOPHAGEAL REFLUX DISEASE WITHOUT ESOPHAGITIS: ICD-10-CM

## 2018-01-01 DIAGNOSIS — H93.19 TINNITUS, UNSPECIFIED LATERALITY: Primary | ICD-10-CM

## 2018-01-01 DIAGNOSIS — G25.0 TREMOR, ESSENTIAL: Chronic | ICD-10-CM

## 2018-01-01 DIAGNOSIS — R30.0 DYSURIA: Primary | ICD-10-CM

## 2018-01-01 DIAGNOSIS — R26.9 GAIT ABNORMALITY: Chronic | ICD-10-CM

## 2018-01-01 DIAGNOSIS — I69.30 CHRONIC ISCHEMIC RIGHT MCA STROKE: Primary | ICD-10-CM

## 2018-01-01 LAB
ALBUMIN SERPL-MCNC: 4.7 G/DL (ref 3.5–5)
ALBUMIN/GLOB SERPL: 1.4 G/DL (ref 1.1–2.5)
ALP SERPL-CCNC: 81 U/L (ref 24–120)
ALT SERPL W P-5'-P-CCNC: 52 U/L (ref 0–54)
ANION GAP SERPL CALCULATED.3IONS-SCNC: 20 MMOL/L (ref 4–13)
APPEARANCE UR: CLEAR
APTT PPP: 28 SECONDS (ref 24.1–34.8)
AST SERPL-CCNC: 63 U/L (ref 7–45)
BACTERIA #/AREA URNS HPF: ABNORMAL /HPF
BACTERIA UR CULT: NO GROWTH
BACTERIA UR CULT: NORMAL
BASOPHILS # BLD AUTO: 0.03 10*3/MM3 (ref 0–0.2)
BASOPHILS # BLD AUTO: 0.04 10*3/MM3 (ref 0–0.2)
BASOPHILS NFR BLD AUTO: 0.1 % (ref 0–2)
BASOPHILS NFR BLD AUTO: 0.5 % (ref 0–2)
BILIRUB SERPL-MCNC: 0.4 MG/DL (ref 0.1–1)
BILIRUB UR QL STRIP: NEGATIVE
BUN BLD-MCNC: 25 MG/DL (ref 5–21)
BUN SERPL-MCNC: 20 MG/DL (ref 5–21)
BUN/CREAT SERPL: 27 (ref 7–25)
BUN/CREAT SERPL: 29.1 (ref 7–25)
CALCIUM SERPL-MCNC: 9.4 MG/DL (ref 8.4–10.4)
CALCIUM SPEC-SCNC: 9.7 MG/DL (ref 8.4–10.4)
CASTS URNS MICRO: ABNORMAL
CHLORIDE SERPL-SCNC: 101 MMOL/L (ref 98–110)
CHLORIDE SERPL-SCNC: 102 MMOL/L (ref 98–110)
CO2 SERPL-SCNC: 21 MMOL/L (ref 24–31)
CO2 SERPL-SCNC: 29 MMOL/L (ref 24–31)
COLOR UR: YELLOW
CREAT BLD-MCNC: 0.86 MG/DL (ref 0.5–1.4)
CREAT SERPL-MCNC: 0.74 MG/DL (ref 0.5–1.4)
DEPRECATED RDW RBC AUTO: 39.8 FL (ref 40–54)
EOSINOPHIL # BLD AUTO: 0 10*3/MM3 (ref 0–0.7)
EOSINOPHIL # BLD AUTO: 0.17 10*3/MM3 (ref 0–0.7)
EOSINOPHIL NFR BLD AUTO: 0 % (ref 0–4)
EOSINOPHIL NFR BLD AUTO: 2.3 % (ref 0–4)
EPI CELLS #/AREA URNS HPF: ABNORMAL /HPF
ERYTHROCYTE [DISTWIDTH] IN BLOOD BY AUTOMATED COUNT: 13 % (ref 12–15)
ERYTHROCYTE [DISTWIDTH] IN BLOOD BY AUTOMATED COUNT: 13.3 % (ref 12–15)
GFR SERPL CREATININE-BSD FRML MDRD: 64 ML/MIN/1.73
GFR SERPLBLD CREATININE-BSD FMLA CKD-EPI: 77 ML/MIN/1.73
GFR SERPLBLD CREATININE-BSD FMLA CKD-EPI: 93 ML/MIN/1.73
GLOBULIN UR ELPH-MCNC: 3.4 GM/DL
GLUCOSE BLD-MCNC: 199 MG/DL (ref 70–100)
GLUCOSE BLDC GLUCOMTR-MCNC: 182 MG/DL (ref 70–130)
GLUCOSE SERPL-MCNC: 71 MG/DL (ref 70–100)
GLUCOSE UR QL: NEGATIVE
HBA1C MFR BLD: 5.4 %
HCT VFR BLD AUTO: 46.7 % (ref 37–47)
HCT VFR BLD AUTO: 51.4 % (ref 37–47)
HGB BLD-MCNC: 14.6 G/DL (ref 12–16)
HGB BLD-MCNC: 17.1 G/DL (ref 12–16)
HGB UR QL STRIP: NEGATIVE
HOLD SPECIMEN: NORMAL
HOLD SPECIMEN: NORMAL
IMM GRANULOCYTES # BLD: 0.01 10*3/MM3 (ref 0–0.03)
IMM GRANULOCYTES # BLD: 0.12 10*3/MM3 (ref 0–0.03)
IMM GRANULOCYTES NFR BLD: 0.1 % (ref 0–5)
IMM GRANULOCYTES NFR BLD: 0.5 % (ref 0–5)
INR PPP: 0.99 (ref 0.91–1.09)
KETONES UR QL STRIP: NEGATIVE
LEUKOCYTE ESTERASE UR QL STRIP: NEGATIVE
LYMPHOCYTES # BLD AUTO: 1.05 10*3/MM3 (ref 0.72–4.86)
LYMPHOCYTES # BLD AUTO: 1.73 10*3/MM3 (ref 0.72–4.86)
LYMPHOCYTES NFR BLD AUTO: 23.6 % (ref 15–45)
LYMPHOCYTES NFR BLD AUTO: 4.8 % (ref 15–45)
MAGNESIUM SERPL-MCNC: 2.1 MG/DL (ref 1.4–2.2)
MCH RBC QN AUTO: 27.9 PG (ref 28–32)
MCH RBC QN AUTO: 28.5 PG (ref 28–32)
MCHC RBC AUTO-ENTMCNC: 31.3 G/DL (ref 33–36)
MCHC RBC AUTO-ENTMCNC: 33.3 G/DL (ref 33–36)
MCV RBC AUTO: 85.5 FL (ref 82–98)
MCV RBC AUTO: 89.3 FL (ref 82–98)
MONOCYTES # BLD AUTO: 0.94 10*3/MM3 (ref 0.19–1.3)
MONOCYTES # BLD AUTO: 1.19 10*3/MM3 (ref 0.19–1.3)
MONOCYTES NFR BLD AUTO: 12.8 % (ref 4–12)
MONOCYTES NFR BLD AUTO: 5.4 % (ref 4–12)
NEUTROPHILS # BLD AUTO: 19.56 10*3/MM3 (ref 1.87–8.4)
NEUTROPHILS # BLD AUTO: 4.44 10*3/MM3 (ref 1.87–8.4)
NEUTROPHILS NFR BLD AUTO: 60.7 % (ref 39–78)
NEUTROPHILS NFR BLD AUTO: 89.2 % (ref 39–78)
NITRITE UR QL STRIP: NEGATIVE
NRBC BLD AUTO-RTO: 0 /100 WBC (ref 0–0)
NRBC BLD MANUAL-RTO: 0 /100 WBC (ref 0–0)
PH UR STRIP: 6 [PH] (ref 5–8)
PLATELET # BLD AUTO: 231 10*3/MM3 (ref 130–400)
PLATELET # BLD AUTO: 298 10*3/MM3 (ref 130–400)
PMV BLD AUTO: 11 FL (ref 6–12)
POTASSIUM BLD-SCNC: 4.6 MMOL/L (ref 3.5–5.3)
POTASSIUM SERPL-SCNC: 4.9 MMOL/L (ref 3.5–5.3)
PROT SERPL-MCNC: 8.1 G/DL (ref 6.3–8.7)
PROT UR QL STRIP: (no result)
PROTHROMBIN TIME: 13.4 SECONDS (ref 11.9–14.6)
RBC # BLD AUTO: 5.23 10*6/MM3 (ref 4.2–5.4)
RBC # BLD AUTO: 6.01 10*6/MM3 (ref 4.2–5.4)
RBC #/AREA URNS HPF: ABNORMAL /HPF
SODIUM BLD-SCNC: 142 MMOL/L (ref 135–145)
SODIUM SERPL-SCNC: 140 MMOL/L (ref 135–145)
SP GR UR: 1.02 (ref 1–1.03)
TROPONIN I SERPL-MCNC: 0.05 NG/ML (ref 0–0.03)
UROBILINOGEN UR STRIP-MCNC: (no result) MG/DL
WBC # BLD AUTO: 7.33 10*3/MM3 (ref 4.8–10.8)
WBC #/AREA URNS HPF: ABNORMAL /HPF
WBC NRBC COR # BLD: 21.95 10*3/MM3 (ref 4.8–10.8)
WHOLE BLOOD HOLD SPECIMEN: NORMAL
WHOLE BLOOD HOLD SPECIMEN: NORMAL

## 2018-01-01 PROCEDURE — 25010000002 MORPHINE SULFATE (PF) 2 MG/ML SOLUTION: Performed by: FAMILY MEDICINE

## 2018-01-01 PROCEDURE — 99285 EMERGENCY DEPT VISIT HI MDM: CPT

## 2018-01-01 PROCEDURE — 93005 ELECTROCARDIOGRAM TRACING: CPT | Performed by: NURSE PRACTITIONER

## 2018-01-01 PROCEDURE — 83735 ASSAY OF MAGNESIUM: CPT | Performed by: PSYCHIATRY & NEUROLOGY

## 2018-01-01 PROCEDURE — 25010000002 LEVETRIRACETAM PER 10 MG: Performed by: PSYCHIATRY & NEUROLOGY

## 2018-01-01 PROCEDURE — 85730 THROMBOPLASTIN TIME PARTIAL: CPT | Performed by: NURSE PRACTITIONER

## 2018-01-01 PROCEDURE — 93010 ELECTROCARDIOGRAM REPORT: CPT | Performed by: INTERNAL MEDICINE

## 2018-01-01 PROCEDURE — 99231 SBSQ HOSP IP/OBS SF/LOW 25: CPT | Performed by: FAMILY MEDICINE

## 2018-01-01 PROCEDURE — 99238 HOSP IP/OBS DSCHRG MGMT 30/<: CPT | Performed by: FAMILY MEDICINE

## 2018-01-01 PROCEDURE — 99222 1ST HOSP IP/OBS MODERATE 55: CPT | Performed by: FAMILY MEDICINE

## 2018-01-01 PROCEDURE — 70450 CT HEAD/BRAIN W/O DYE: CPT

## 2018-01-01 PROCEDURE — 82962 GLUCOSE BLOOD TEST: CPT

## 2018-01-01 PROCEDURE — 99232 SBSQ HOSP IP/OBS MODERATE 35: CPT | Performed by: FAMILY MEDICINE

## 2018-01-01 PROCEDURE — 99214 OFFICE O/P EST MOD 30 MIN: CPT | Performed by: FAMILY MEDICINE

## 2018-01-01 PROCEDURE — 99214 OFFICE O/P EST MOD 30 MIN: CPT | Performed by: INTERNAL MEDICINE

## 2018-01-01 PROCEDURE — 99213 OFFICE O/P EST LOW 20 MIN: CPT | Performed by: CLINICAL NURSE SPECIALIST

## 2018-01-01 PROCEDURE — 84484 ASSAY OF TROPONIN QUANT: CPT | Performed by: NURSE PRACTITIONER

## 2018-01-01 PROCEDURE — 80053 COMPREHEN METABOLIC PANEL: CPT | Performed by: NURSE PRACTITIONER

## 2018-01-01 PROCEDURE — 51701 INSERT BLADDER CATHETER: CPT | Performed by: FAMILY MEDICINE

## 2018-01-01 PROCEDURE — 99223 1ST HOSP IP/OBS HIGH 75: CPT | Performed by: PSYCHIATRY & NEUROLOGY

## 2018-01-01 PROCEDURE — 85025 COMPLETE CBC W/AUTO DIFF WBC: CPT | Performed by: NURSE PRACTITIONER

## 2018-01-01 PROCEDURE — 25010000002 LEVETRIRACETAM PER 10 MG

## 2018-01-01 PROCEDURE — 71045 X-RAY EXAM CHEST 1 VIEW: CPT

## 2018-01-01 PROCEDURE — 85610 PROTHROMBIN TIME: CPT | Performed by: NURSE PRACTITIONER

## 2018-01-01 PROCEDURE — 93000 ELECTROCARDIOGRAM COMPLETE: CPT | Performed by: INTERNAL MEDICINE

## 2018-01-01 RX ORDER — MORPHINE SULFATE 2 MG/ML
2 INJECTION, SOLUTION INTRAMUSCULAR; INTRAVENOUS
Status: DISCONTINUED | OUTPATIENT
Start: 2018-01-01 | End: 2018-01-01 | Stop reason: HOSPADM

## 2018-01-01 RX ORDER — LEVETIRACETAM 500 MG/5ML
INJECTION, SOLUTION, CONCENTRATE INTRAVENOUS
Status: COMPLETED
Start: 2018-01-01 | End: 2018-01-01

## 2018-01-01 RX ORDER — POTASSIUM CHLORIDE 20 MEQ/1
TABLET, EXTENDED RELEASE ORAL
Qty: 180 TABLET | Refills: 1 | Status: SHIPPED | OUTPATIENT
Start: 2018-01-01

## 2018-01-01 RX ORDER — ATORVASTATIN CALCIUM 40 MG/1
TABLET, FILM COATED ORAL
Qty: 90 TABLET | Refills: 1 | Status: SHIPPED | OUTPATIENT
Start: 2018-01-01

## 2018-01-01 RX ORDER — LEVETIRACETAM 10 MG/ML
1000 INJECTION INTRAVASCULAR ONCE
Status: DISCONTINUED | OUTPATIENT
Start: 2018-01-01 | End: 2018-01-01

## 2018-01-01 RX ORDER — PRIMIDONE 50 MG/1
TABLET ORAL
Qty: 180 TABLET | Refills: 2 | Status: SHIPPED | OUTPATIENT
Start: 2018-01-01

## 2018-01-01 RX ORDER — SODIUM CHLORIDE 9 MG/ML
75 INJECTION, SOLUTION INTRAVENOUS CONTINUOUS
Status: DISCONTINUED | OUTPATIENT
Start: 2018-01-01 | End: 2018-01-01 | Stop reason: HOSPADM

## 2018-01-01 RX ORDER — METOPROLOL SUCCINATE 25 MG/1
12.5 TABLET, EXTENDED RELEASE ORAL DAILY
Qty: 45 TABLET | Refills: 1 | Status: SHIPPED | OUTPATIENT
Start: 2018-01-01

## 2018-01-01 RX ORDER — SODIUM CHLORIDE 0.9 % (FLUSH) 0.9 %
10 SYRINGE (ML) INJECTION AS NEEDED
Status: DISCONTINUED | OUTPATIENT
Start: 2018-01-01 | End: 2018-01-01 | Stop reason: HOSPADM

## 2018-01-01 RX ORDER — PROPAFENONE HYDROCHLORIDE 150 MG/1
TABLET, COATED ORAL
Qty: 270 TABLET | Refills: 1 | Status: SHIPPED | OUTPATIENT
Start: 2018-01-01

## 2018-01-01 RX ORDER — LABETALOL HYDROCHLORIDE 5 MG/ML
20 INJECTION, SOLUTION INTRAVENOUS ONCE
Status: COMPLETED | OUTPATIENT
Start: 2018-01-01 | End: 2018-01-01

## 2018-01-01 RX ORDER — DIGOXIN 125 MCG
TABLET ORAL
Qty: 15 TABLET | Refills: 5 | Status: SHIPPED | OUTPATIENT
Start: 2018-01-01

## 2018-01-01 RX ORDER — LISINOPRIL 2.5 MG/1
TABLET ORAL
Qty: 90 TABLET | Refills: 1 | Status: SHIPPED | OUTPATIENT
Start: 2018-01-01

## 2018-01-01 RX ORDER — SCOLOPAMINE TRANSDERMAL SYSTEM 1 MG/1
1 PATCH, EXTENDED RELEASE TRANSDERMAL
Status: DISCONTINUED | OUTPATIENT
Start: 2018-01-01 | End: 2018-01-01 | Stop reason: HOSPADM

## 2018-01-01 RX ADMIN — SODIUM CHLORIDE 75 ML/HR: 9 INJECTION, SOLUTION INTRAVENOUS at 12:03

## 2018-01-01 RX ADMIN — MORPHINE SULFATE 2 MG: 2 INJECTION, SOLUTION INTRAMUSCULAR; INTRAVENOUS at 15:32

## 2018-01-01 RX ADMIN — MORPHINE SULFATE 2 MG: 2 INJECTION, SOLUTION INTRAMUSCULAR; INTRAVENOUS at 03:23

## 2018-01-01 RX ADMIN — MORPHINE SULFATE 2 MG: 2 INJECTION, SOLUTION INTRAMUSCULAR; INTRAVENOUS at 20:38

## 2018-01-01 RX ADMIN — LEVETIRACETAM 750 MG: 500 INJECTION, SOLUTION, CONCENTRATE INTRAVENOUS at 08:53

## 2018-01-01 RX ADMIN — LEVETIRACETAM 750 MG: 500 INJECTION, SOLUTION, CONCENTRATE INTRAVENOUS at 20:38

## 2018-01-01 RX ADMIN — LABETALOL HYDROCHLORIDE 20 MG: 5 INJECTION, SOLUTION INTRAVENOUS at 09:18

## 2018-01-01 RX ADMIN — MORPHINE SULFATE 2 MG: 2 INJECTION, SOLUTION INTRAMUSCULAR; INTRAVENOUS at 05:19

## 2018-01-01 RX ADMIN — LEVETIRACETAM 1000 MG: 100 INJECTION, SOLUTION INTRAVENOUS at 08:44

## 2018-01-01 RX ADMIN — MORPHINE SULFATE 2 MG: 2 INJECTION, SOLUTION INTRAMUSCULAR; INTRAVENOUS at 20:23

## 2018-01-01 RX ADMIN — SODIUM CHLORIDE 75 ML/HR: 9 INJECTION, SOLUTION INTRAVENOUS at 00:45

## 2018-01-01 RX ADMIN — MORPHINE SULFATE 2 MG: 2 INJECTION, SOLUTION INTRAMUSCULAR; INTRAVENOUS at 08:53

## 2018-01-01 RX ADMIN — MORPHINE SULFATE 2 MG: 2 INJECTION, SOLUTION INTRAMUSCULAR; INTRAVENOUS at 00:41

## 2018-01-01 RX ADMIN — MORPHINE SULFATE 2 MG: 2 INJECTION, SOLUTION INTRAMUSCULAR; INTRAVENOUS at 15:19

## 2018-01-01 RX ADMIN — SCOPALAMINE 1 PATCH: 1 PATCH, EXTENDED RELEASE TRANSDERMAL at 12:08

## 2018-01-10 PROBLEM — R29.6 FREQUENT FALLS: Status: ACTIVE | Noted: 2018-01-01

## 2018-01-10 NOTE — PROGRESS NOTES
Daphne Santos  [id number]  1940  77 y.o.  female    Referring Provider: Tre Sevilla MD    Reason for Follow-up Visit:  Routine follow up.   Paroxysmal atrial fibrillation     Subjective    Overall feeling well   No chest pain or shortness of breath   No palpitations  No significant pedal edema  Compliant with medications and dietary advice  Poor effort tolerance  Effort tolerance limited more by orthopedic rather than cardiac related issues.    No presyncope or syncope  Compliant with medications        History of present illness:  Daphne Santos is a 77 y.o. yo female with history of Paroxysmal atrial fibrillation who presents today for   Chief Complaint   Patient presents with   • Slow Heart Rate     1 year follow up    • Edema     leg swelling.    • Fatigue     very easily    .    History  Past Medical History:   Diagnosis Date   • Arthritis    • Atrial fibrillation    • Chest pain    • Hx of colonic polyps    • Hypertension    • Seizures    • Stroke 2015   • Syncope and collapse    • Tremor    • Ventricular tachycardia (paroxysmal)    ,   Past Surgical History:   Procedure Laterality Date   • CATARACT EXTRACTION     • CHOLECYSTECTOMY WITH INTRAOPERATIVE CHOLANGIOGRAM N/A 9/21/2017    Procedure: CHOLECYSTECTOMY LAPAROSCOPIC;  Surgeon: Sherry Luna MD;  Location: [facility] OR;  Service:    • COLONOSCOPY W/ POLYPECTOMY  04/13/2012    Small polyp at 40 cm repeat exam in 5 in years   • ENDOSCOPY  07/23/2008    Normal exam   • ENDOSCOPY N/A 8/24/2017    Procedure: ESOPHAGOGASTRODUODENOSCOPY WITH ANESTHESIA;  Surgeon: Rajendra Devlin MD;  Location: [facility] ENDOSCOPY;  Service:    • HYSTERECTOMY     • THYROIDECTOMY, PARTIAL     ,   Family History   Problem Relation Age of Onset   • Heart disease Mother    • Stroke Mother    • Heart failure Mother    • Colon polyps Mother    • Cancer Father    • No Known Problems Sister    • No Known Problems Brother    • No Known Problems Sister    • Colon cancer Neg Hx     ,   Social History   Substance Use Topics   • Smoking status: Never Smoker   • Smokeless tobacco: Never Used   • Alcohol use No   ,     Medications  Current Outpatient Prescriptions   Medication Sig Dispense Refill   • acetaminophen (TYLENOL) 325 MG tablet Take 650 mg by mouth every night.     • aspirin 81 MG EC tablet Take 81 mg by mouth Daily. Last dose 9/19     • atorvastatin (LIPITOR) 40 MG tablet TAKE ONE TABLET NIGHTLY 90 tablet 1   • calcium carbonate-vitamin d (CALCIUM 600+D) 600-400 MG-UNIT per tablet Take 1 tablet by mouth 2 (two) times a day.     • digoxin (LANOXIN) 125 MCG tablet TAKE 1/2 TABLET DAILY 15 tablet 5   • fluticasone (FLONASE) 50 MCG/ACT nasal spray USE 2 SPRAYS IN EACH NOSTRIL DAILY (Patient taking differently: USE 2 SPRAYS IN EACH NOSTRIL DAILY as needed) 16 mL 5   • levETIRAcetam (KEPPRA) 1000 MG tablet TAKE ONE TABLET TWICE DAILY 60 tablet 5   • lisinopril (PRINIVIL,ZESTRIL) 2.5 MG tablet TAKE ONE TABLET DAILY 90 tablet 1   • Magnesium 200 MG tablet Take 1 tablet by mouth Daily.     • metoprolol succinate XL (TOPROL-XL) 25 MG 24 hr tablet TAKE ONE TABLET DAILY 90 tablet 1   • Multiple Vitamins-Minerals (EYE VITAMINS PO) Take 1 tablet by mouth daily.     • Multiple Vitamins-Minerals (MULTIVITAMIN ADULT PO) Take 1 tablet by mouth daily.     • omeprazole (priLOSEC) 20 MG capsule Take 1 capsule by mouth 2 (Two) Times a Day. 60 capsule 11   • polyethylene glycol (MIRALAX) packet Take 17 g by mouth daily.     • potassium chloride (K-DUR,KLOR-CON) 20 MEQ CR tablet TAKE ONE TABLET TWICE DAILY 180 tablet 1   • primidone (MYSOLINE) 50 MG tablet TAKE TWO TABLETS THREE TIMES DAILY 180 tablet 2   • propafenone (RYTHMOL) 150 MG tablet TAKE ONE TABLET THREE TIMES DAILY 90 tablet 5     No current facility-administered medications for this visit.        Allergies:  Contrast dye and Nexium [esomeprazole]    Review of Systems  Review of Systems   Constitution: Positive for weakness and malaise/fatigue.  "  HENT: Negative.    Eyes: Negative.    Cardiovascular: Positive for leg swelling. Negative for chest pain, claudication, cyanosis, dyspnea on exertion, irregular heartbeat, near-syncope, orthopnea, palpitations, paroxysmal nocturnal dyspnea and syncope.   Respiratory: Negative.    Endocrine: Negative.    Hematologic/Lymphatic: Negative.    Skin: Negative.    Gastrointestinal: Negative for anorexia.   Genitourinary: Negative.    Neurological: Positive for light-headedness.   Psychiatric/Behavioral: Negative.        Objective     Physical Exam:  /60 (BP Location: Right arm, Patient Position: Sitting, Cuff Size: Adult)  Pulse 57  Ht 152.4 cm (60\")  Wt 68.5 kg (151 lb)  SpO2 98%  BMI 29.49 kg/m2  Physical Exam   Constitutional: She appears well-developed.   HENT:   Head: Normocephalic.   Neck: Normal carotid pulses and no JVD present. No tracheal tenderness present. Carotid bruit is not present. No tracheal deviation and no edema present.   Cardiovascular: Regular rhythm and normal pulses.    Murmur heard.   Systolic murmur is present with a grade of 2/6   Pulmonary/Chest: Effort normal. No stridor.   Abdominal: Soft.   Neurological: She is alert. She has normal strength. No cranial nerve deficit or sensory deficit.   Skin: Skin is warm.   Psychiatric: She has a normal mood and affect. Her speech is normal and behavior is normal.       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   • Gastroesophageal reflux disease   • Macular degeneration   • Hypothyroidism   • Hyperlipidemia   • Corns and callosities   • Cardiac arrhythmia-paroxysmal a fib   • Hypopotassemia   • Abnormal x-ray-consider repeat   • Epigastric pain   • HTN (hypertension), benign   • Laboratory test*   • Biliary colic   • Frequent falls     Results for orders placed in visit on 05/05/16   SCANNED - ECHOCARDIOGRAM     Stable doing well. No exertional chest pain or excessive dyspnea. No palpitations. No significant pedal edema. Compliant with medications and diet. Latest labs and medications reviewed.    Plan:    Cannot take anticoagulation due to recurrent falls from lack of balance  Keep LDL below 70 mg/dl. Monitor liver and renal functions.   Monitor CBC, CMP and Lipid Panel by primary   ENT referral for tinnitus  Monitor for any signs of bleeding including red or dark stools. Fall precautions.   Patient is asked to monitor BP at home or work, several times per month and return with written values at next office visit.   Low salt/ HTN/ Heart healthy carbohydrate restricted cardiac diet as applicable to this patient's current diagnoses.   This handout has relevant information regarding shopping for food, preparing meals, what to eat at restaurants, tracking of food intake, information regarding sodium intake and salt content, how to read food labels, knowing what to eat, tips reagarding physical activity, calorie count and calorie expenditure. What foods to avoid. Information regarding alcoholic drinks along with \"good\" and \"bad\" fats.  Relevant printed educational materials given pertinent to above diagnoses     Close " follow up with you as scheduled.  Intensive factor modifications.  See order list.    Dr Lopez and associates seeing her  Counseled regarding disease appropriate diet, fluid, caffeine, stimulants and sodium intake as well as importance of compliance to diet, exercise and regular follow up.  Decrease Toprol XL 12.5 mg daily  Gave a copy of my notes and relevant tests/ prior ECG etc for the patient to review and follow specific advise and relevant findings if any, prognosis, along with my current and future plans.   Requested Prescriptions     Pending Prescriptions Disp Refills   • metoprolol succinate XL (TOPROL-XL) 25 MG 24 hr tablet 45 tablet 1     Sig: Take 0.5 tablets by mouth Daily.          Return in about 6 months (around 7/10/2018).

## 2018-02-06 NOTE — PROGRESS NOTES
Subjective     Chief Complaint   Patient presents with   • Seizures     patient states no activity of another seizure        Daphne Santos is a 77 y.o. female right handed and lives at assisted living.  She is accompanied by her sister. Patient is here today for follow up for stroke, post stroke epilepsy, and essential tremor. She was last seen 8/2017. She did have cholecystectomy 9/2017 and per sister did remarkably well.   .  As you recall she has history of RMCA and post stroke epilepsy in 2015. Patient is taking Keppra 1000 mg BID.  She has an essential tremor component as well.  Patient at one time was trialed on carbidopa/levodopa but there was no improvement and was discontinued. Patient takes Primidone  100 mg TID and the patient is tolerating this.  Since last visit she denies new stroke symptoms or seizures.  She denies worsening tremor.  She does have history of afib and had been taking Xarelto but she fell in 12/15 and suffered a hemothorax.  Xarelto was discontinued at that time and not resumed and she has fallen several times since once striking her forehead.  Her last fall was about October 2017 with no apparent injury. The patient walks with walker at home and per sister is dependent on it.  It was decided the risk of bleed was higher with the Xarelto than stroke and she now takes ASA 81 mg daily.  The patient also has history of HTN, dyslipidemia, and GERD.    Seizures    This is a chronic problem. Episode onset: last reported episode May 2016. Associated symptoms include sleepiness. Pertinent negatives include no confusion, no headaches, no sore throat, no chest pain, no cough, no nausea, no vomiting and no diarrhea. involuntary tremor LUE   Stroke   This is a chronic (RMCA) problem. The current episode started more than 1 year ago (2015). The problem has been gradually improving. Associated symptoms include arthralgias (knees) and weakness (LUE/LLE). Pertinent negatives include no chest pain,  coughing, diaphoresis, fatigue, fever, headaches, nausea, sore throat or vomiting. Associated symptoms comments: Left sided weakness. Exacerbated by: afib. The treatment provided moderate relief.        Current Outpatient Prescriptions   Medication Sig Dispense Refill   • acetaminophen (TYLENOL) 325 MG tablet Take 650 mg by mouth every night.     • aspirin 81 MG EC tablet Take 81 mg by mouth Daily. Last dose 9/19     • atorvastatin (LIPITOR) 40 MG tablet TAKE ONE TABLET NIGHTLY 90 tablet 1   • calcium carbonate-vitamin d (CALCIUM 600+D) 600-400 MG-UNIT per tablet Take 1 tablet by mouth 2 (two) times a day.     • digoxin (LANOXIN) 125 MCG tablet TAKE 1/2 TABLET DAILY 15 tablet 5   • fluticasone (FLONASE) 50 MCG/ACT nasal spray USE 2 SPRAYS IN EACH NOSTRIL DAILY (Patient taking differently: USE 2 SPRAYS IN EACH NOSTRIL DAILY as needed) 16 mL 5   • levETIRAcetam (KEPPRA) 1000 MG tablet TAKE ONE TABLET TWICE DAILY 60 tablet 5   • lisinopril (PRINIVIL,ZESTRIL) 2.5 MG tablet TAKE ONE TABLET DAILY 90 tablet 1   • Magnesium 200 MG tablet Take 1 tablet by mouth Daily.     • metoprolol succinate XL (TOPROL-XL) 25 MG 24 hr tablet Take 0.5 tablets by mouth Daily. 45 tablet 1   • Multiple Vitamins-Minerals (EYE VITAMINS PO) Take 1 tablet by mouth daily.     • Multiple Vitamins-Minerals (MULTIVITAMIN ADULT PO) Take 1 tablet by mouth daily.     • omeprazole (priLOSEC) 20 MG capsule Take 1 capsule by mouth 2 (Two) Times a Day. 60 capsule 11   • polyethylene glycol (MIRALAX) packet Take 17 g by mouth daily.     • potassium chloride (K-DUR,KLOR-CON) 20 MEQ CR tablet TAKE ONE TABLET TWICE DAILY 180 tablet 1   • primidone (MYSOLINE) 50 MG tablet TAKE TWO TABLETS THREE TIMES DAILY 180 tablet 2   • propafenone (RYTHMOL) 150 MG tablet TAKE ONE TABLET THREE TIMES DAILY 90 tablet 5     No current facility-administered medications for this visit.        Past Medical History:   Diagnosis Date   • Arthritis    • Atrial fibrillation    • Chest  pain    • Hx of colonic polyps    • Hypertension    • Seizures    • Stroke 2015   • Syncope and collapse    • Tremor    • Ventricular tachycardia (paroxysmal)        Past Surgical History:   Procedure Laterality Date   • CATARACT EXTRACTION     • CHOLECYSTECTOMY WITH INTRAOPERATIVE CHOLANGIOGRAM N/A 9/21/2017    Procedure: CHOLECYSTECTOMY LAPAROSCOPIC;  Surgeon: Sherry Luna MD;  Location: [facility] OR;  Service:    • COLONOSCOPY W/ POLYPECTOMY  04/13/2012    Small polyp at 40 cm repeat exam in 5 in years   • ENDOSCOPY  07/23/2008    Normal exam   • ENDOSCOPY N/A 8/24/2017    Procedure: ESOPHAGOGASTRODUODENOSCOPY WITH ANESTHESIA;  Surgeon: Rajendra Devlin MD;  Location: [facility] ENDOSCOPY;  Service:    • HYSTERECTOMY     • THYROIDECTOMY, PARTIAL         family history includes Cancer in her father; Colon polyps in her mother; Heart disease in her mother; Heart failure in her mother; No Known Problems in her brother, sister, and sister; Stroke in her mother. There is no history of Colon cancer.    Social History   Substance Use Topics   • Smoking status: Never Smoker   • Smokeless tobacco: Never Used   • Alcohol use No       Review of Systems   Constitutional: Negative for diaphoresis, fatigue and fever.   HENT: Negative.  Negative for rhinorrhea and sore throat.    Eyes: Negative.         History of macular degenerations and decrease vision left > right   Respiratory: Negative.  Negative for cough, choking and wheezing.    Cardiovascular: Negative.  Negative for chest pain.   Gastrointestinal: Positive for constipation. Negative for blood in stool, diarrhea, nausea and vomiting.   Endocrine: Negative.    Genitourinary: Negative.  Negative for dysuria and urgency.   Musculoskeletal: Positive for arthralgias (knees) and gait problem (walks with rollerator. fear of falling).   Skin: Negative.    Allergic/Immunologic: Negative.    Neurological: Positive for tremors (ET bilateral and head tremor), seizures, weakness  "(LUE/LLE) and light-headedness. Negative for dizziness and headaches.   Hematological: Negative.  Negative for adenopathy.   Psychiatric/Behavioral: Negative.  Negative for agitation and confusion.   All other systems reviewed and are negative.      Objective     /78 (BP Location: Left arm, Patient Position: Sitting, Cuff Size: Adult)  Pulse 53  Resp 16  Ht 152.4 cm (60\")  Wt 68.5 kg (151 lb)  SpO2 98%  BMI 29.49 kg/m2, Body mass index is 29.49 kg/(m^2).    Physical Exam   Constitutional: She is oriented to person, place, and time. Vital signs are normal. She appears well-developed and well-nourished.   HENT:   Head: Normocephalic and atraumatic.   Right Ear: External ear normal.   Left Ear: External ear normal.   Nose: Nose normal.   Mouth/Throat: Uvula is midline and oropharynx is clear and moist.   Eyes: Conjunctivae, EOM and lids are normal. Pupils are equal, round, and reactive to light.   Eye lid droops causing ptosis left> right   Neck: Trachea normal and normal range of motion. Neck supple. Carotid bruit is not present.   Cardiovascular: Normal rate, regular rhythm, S1 normal, S2 normal and normal heart sounds.    Pulmonary/Chest: Effort normal and breath sounds normal.   Abdominal: Soft. Bowel sounds are normal.   Musculoskeletal:        Right shoulder: She exhibits decreased range of motion.        Left shoulder: She exhibits decreased range of motion (hx of left humerus fracture 2016.).   Neurological: She is alert and oriented to person, place, and time. She has normal strength and normal reflexes. She displays tremor (essential tremor bilateral and head and voice tremor. patient able to feed/dress self and perform ADLs.). No cranial nerve deficit or sensory deficit. She exhibits normal muscle tone (no increase tone or cogwheeling). She displays a negative Romberg sign. She displays no seizure activity. Gait (I did attempt to ambulate patient with assistance  but was aborted as patient had " fear of falling as did not have her walker. ) abnormal. Abnormal coordination: no ataxia but does have ET.   Reflex Scores:       Tricep reflexes are 2+ on the right side and 2+ on the left side.       Bicep reflexes are 2+ on the right side and 2+ on the left side.       Brachioradialis reflexes are 2+ on the right side and 2+ on the left side.       Patellar reflexes are 2+ on the right side and 2+ on the left side.       Achilles reflexes are 2+ on the right side and 2+ on the left side.  Awake, alert, no aphasia, no dysarthria.    CN II:  Visual fields full.  Pupils equally reactive to light  CN III, IV, VI:  Extraocular Muscles full with no signs of nystagmus  CN V:  Facial sensory is symmetric with no asymetries.  CN VII:  Facial motor symmetric  CN VIII:  Gross hearing intact bilaterally  CN IX:  Palate elevates symmetrically  CN X:  Palate elevates symmetrically  CN XI:  Shoulder shrug symmetric  CN XII:  Tongue is midline on protrusion    Extremely mild weakness of LUE and LLE, no ataxia  RUE/RLE strength intact.   Skin: Skin is warm and dry.   Psychiatric: She has a normal mood and affect. Her speech is normal and behavior is normal. Cognition and memory are normal.   Nursing note and vitals reviewed.      Results for orders placed or performed in visit on 11/28/17   Urine culture, Comprehensive   Result Value Ref Range    Urine Culture Final report (A)     Result 1 Comment (A)     Result 2 Comment (A)     Susceptibility Testing Comment    Urinalysis With / Culture If Indicated - Urine, Clean Catch   Result Value Ref Range    Specific Gravity, UA 1.009 1.005 - 1.030    pH, UA 6.0 5.0 - 7.5    Color, UA Yellow Yellow    Appearance, UA Clear Clear    Leukocytes, UA 1+ (A) Negative    Protein Negative Negative/Trace    Glucose, UA Negative Negative    Ketones Negative Negative    Blood, UA Negative Negative    Bilirubin, UA Negative Negative    Urobilinogen, UA 0.2 0.2 - 1.0 mg/dL    Nitrite, UA Negative  Negative    Microscopic Examination See below:     Urinalysis Reflex Comment    Microscopic Examination   Result Value Ref Range    WBC, UA 0-5 0 - 5 /hpf    RBC, UA None seen 0 - 2 /hpf    Epithelial Cells (non renal) 0-10 0 - 10 /hpf    Mucus, UA Present Not Estab. /hpf    Bacteria, UA None seen None seen/Few /hpf        ASSESSMENT/PLAN    Diagnoses and all orders for this visit:    Chronic ischemic right MCA stroke 4.    Essential hypertension    Mixed hyperlipidemia    Tremor, essential    Intractable epilepsy without status epilepticus, unspecified epilepsy type    MEDICAL DECISION MAKIN. Continue with ASA 81 mg daily. Patient does have hx of AFIB but has hx of hemothorax and frequent falls and determined with this more risk for bleeding secondary to fall than stroke risk  2. Continue with Keppra 1000 mg BID   3. Continue with statin per PCP for LDL goal less than 70.  4. BP managed by PCP for systolic less than 140.  5. Continue with Primidone 100 mg TID  6. Does not use tobacco.  7. Elevated BMI - Patient's BMI is above normal parameters. Follow-up plan includes:  no follow-up required.      8.Seizure precautions were discussed to include no tub baths, no swimming, avoiding lack of sleep, and avoiding known triggers. Education given of things that may contribute to a seizure to include, but not limited to: stressful situations, fever, fatigue, lack of sleep, low blood sugar, hyperventilation, flashing lights, and caffeine. Instructions given to take seizure medications as prescribed. Education given to family member on what to do during a seizure and care following the seizure. Education given to contact this office prior to stopping or changing any medications.  9.Patient is counseled on stroke signs and symptoms using FAST and Time Saved is Brain Saved.                allergies and all known medications/prescriptions have been reviewed using resources available on this encounter.    Return in about  6 months (around 8/6/2018).        Mary Ann Mccurdy, APRN

## 2018-02-06 NOTE — PATIENT INSTRUCTIONS
Stroke Prevention  Some medical conditions and behaviors are associated with an increased chance of having a stroke. You may prevent a stroke by making healthy choices and managing medical conditions.  How can I reduce my risk of having a stroke?  · Stay physically active. Get at least 30 minutes of activity on most or all days.  · Do not smoke. It may also be helpful to avoid exposure to secondhand smoke.  · Limit alcohol use. Moderate alcohol use is considered to be:  ¨ No more than 2 drinks per day for men.  ¨ No more than 1 drink per day for nonpregnant women.  · Eat healthy foods. This involves:  ¨ Eating 5 or more servings of fruits and vegetables a day.  ¨ Making dietary changes that address high blood pressure (hypertension), high cholesterol, diabetes, or obesity.  · Manage your cholesterol levels.  ¨ Making food choices that are high in fiber and low in saturated fat, trans fat, and cholesterol may control cholesterol levels.  ¨ Take any prescribed medicines to control cholesterol as directed by your health care provider.  · Manage your diabetes.  ¨ Controlling your carbohydrate and sugar intake is recommended to manage diabetes.  ¨ Take any prescribed medicines to control diabetes as directed by your health care provider.  · Control your hypertension.  ¨ Making food choices that are low in salt (sodium), saturated fat, trans fat, and cholesterol is recommended to manage hypertension.  ¨ Ask your health care provider if you need treatment to lower your blood pressure. Take any prescribed medicines to control hypertension as directed by your health care provider.  ¨ If you are 18-39 years of age, have your blood pressure checked every 3-5 years. If you are 40 years of age or older, have your blood pressure checked every year.  · Maintain a healthy weight.  ¨ Reducing calorie intake and making food choices that are low in sodium, saturated fat, trans fat, and cholesterol are recommended to manage  weight.  · Stop drug abuse.  · Avoid taking birth control pills.  ¨ Talk to your health care provider about the risks of taking birth control pills if you are over 35 years old, smoke, get migraines, or have ever had a blood clot.  · Get evaluated for sleep disorders (sleep apnea).  ¨ Talk to your health care provider about getting a sleep evaluation if you snore a lot or have excessive sleepiness.  · Take medicines only as directed by your health care provider.  ¨ For some people, aspirin or blood thinners (anticoagulants) are helpful in reducing the risk of forming abnormal blood clots that can lead to stroke. If you have the irregular heart rhythm of atrial fibrillation, you should be on a blood thinner unless there is a good reason you cannot take them.  ¨ Understand all your medicine instructions.  · Make sure that other conditions (such as anemia or atherosclerosis) are addressed.  Get help right away if:  · You have sudden weakness or numbness of the face, arm, or leg, especially on one side of the body.  · Your face or eyelid droops to one side.  · You have sudden confusion.  · You have trouble speaking (aphasia) or understanding.  · You have sudden trouble seeing in one or both eyes.  · You have sudden trouble walking.  · You have dizziness.  · You have a loss of balance or coordination.  · You have a sudden, severe headache with no known cause.  · You have new chest pain or an irregular heartbeat.  Any of these symptoms may represent a serious problem that is an emergency. Do not wait to see if the symptoms will go away. Get medical help at once. Call your local emergency services (911 in [place] Do not drive yourself to the hospital.   This information is not intended to replace advice given to you by your health care provider. Make sure you discuss any questions you have with your health care provider.  Document Released: 01/25/2006 Document Revised: 05/25/2017 Document Reviewed: 06/20/2014  [employer]  Interactive Patient Education © 2017 [employer]  Epilepsy  Epilepsy is a condition in which a person has repeated seizures over time. A seizure is a sudden burst of abnormal electrical and chemical activity in the brain. Seizures can cause a change in attention, behavior, or the ability to remain awake and alert (altered mental status).  Epilepsy increases a person's risk of falls, accidents, and injury. It can also lead to complications, including:  · Depression.  · Poor memory.  · Sudden unexplained death in epilepsy (SUDEP). This complication is rare, and its cause is not known.  Most people with epilepsy lead normal lives.  What are the causes?  This condition may be caused by:  · A head injury.  · An injury that happens at birth.  · A high fever during childhood.  · A stroke.  · Bleeding that goes into or around the brain.  · Certain medicines and drugs.  · Having too little oxygen for a long period of time.  · Abnormal brain development.  · Certain infections, such as meningitis and encephalitis.  · Brain tumors.  · Conditions that are passed along from parent to child (are hereditary).  What are the signs or symptoms?  Symptoms of a seizure vary greatly from person to person. They include:  · Convulsions.  · Stiffening of the body.  · Involuntary movements of the arms or legs.  · Loss of consciousness.  · Breathing problems.  · Falling suddenly.  · Confusion.  · Head nodding.  · Eye blinking or fluttering.  · Lip smacking.  · Drooling.  · Rapid eye movements.  · Grunting.  · Loss of bladder control and bowel control.  · Staring.  · Unresponsiveness.  Some people have symptoms right before a seizure happens (aura) and right after a seizure happens. Symptoms of an aura include:  · Fear or anxiety.  · Nausea.  · Feeling like the room is spinning (vertigo).  · A feeling of having seen or heard something before (latisha vu).  · Odd tastes or smells.  · Changes in vision, such as seeing flashing lights or  spots.  Symptoms that follow a seizure include:  · Confusion.  · Sleepiness.  · Headache.  How is this diagnosed?  This condition is diagnosed based on:  · Your symptoms.  · Your medical history.  · A physical exam.  · A neurological exam. A neurological exam is similar to a physical exam. It involves checking your strength, reflexes, coordination, and sensations.  · Tests, such as:  ¨ An electroencephalogram (EEG). This is a painless test that creates a diagram of your brain waves.  ¨ An MRI of the brain.  ¨ A CT scan of the brain.  ¨ A lumbar puncture, also called a spinal tap.  ¨ Blood tests to check for signs of infection or abnormal blood chemistry.  How is this treated?  There is no cure for this condition, but treatment can help control seizures. Treatment may involve:  · Taking medicines to control seizures. These include medicines to prevent seizures and medicines to stop seizures as they occur.  · Having a device called a vagus nerve stimulator implanted in the chest. The device sends electrical impulses to the vagus nerve and to the brain to prevent seizures. This treatment may be recommended if medicines do not help.  · Brain surgery. There are several kinds of surgeries that may be done to stop seizures from happening or to reduce how often seizures happen.  · Having regular blood tests. You may need to have blood tests regularly to check that you are getting the right amount of medicine.  Once this condition has been diagnosed, it is important to begin treatment as soon as possible. For some people, epilepsy eventually goes away.  Follow these instructions at home:  Medicines  · Take over-the-counter and prescription medicines only as told by your health care provider.  · Avoid any substances that may prevent your medicine from working properly, such as alcohol.  Activity  · Get enough rest. Lack of sleep can make seizures more likely to occur.  · Follow instructions from your health care provider  about driving, swimming, and doing any other activities that would be dangerous if you had a seizure.  Educating others   Teach friends and family what to do if you have a seizure. They should:  · Lay you on the ground to prevent a fall.  · Cushion your head and body.  · Loosen any tight clothing around your neck.  · Turn you on your side. If vomiting occurs, this helps keep your airway clear.  · Stay with you until you recover.  · Not hold you down. Holding you down will not stop the seizure.  · Not put anything in your mouth.  · Know whether or not you need emergency care.  General instructions  · Avoid anything that has ever triggered a seizure for you.  · Keep a seizure diary. Record what you remember about each seizure, especially anything that might have triggered the seizure.  · Keep all follow-up visits as told by your health care provider. This is important.  Contact a health care provider if:  · Your seizure pattern changes.  · You have symptoms of infection or another illness. This might increase your risk of having a seizure.  Get help right away if:  · You have a seizure that does not stop after 5 minutes.  · You have several seizures in a row without a complete recovery in between seizures.  · You have a seizure that makes it harder to breathe.  · You have a seizure that is different from previous seizures.  · You have a seizure that leaves you unable to speak or use a part of your body.  · You did not wake up immediately after a seizure.  This information is not intended to replace advice given to you by your health care provider. Make sure you discuss any questions you have with your health care provider.  Document Released: 12/18/2006 Document Revised: 07/15/2017 Document Reviewed: 06/27/2017  [employer] Interactive Patient Education © 2017 [employer]  BMI for Adults  Body mass index (BMI) is a number that is calculated from a person's weight and height. In most adults, the number is used to find  how much of an adult's weight is made up of fat. BMI is not as accurate as a direct measure of body fat.  How is BMI calculated?  BMI is calculated by dividing weight in kilograms by height in meters squared. It can also be calculated by dividing weight in pounds by height in inches squared, then multiplying the resulting number by 703. Charts are available to help you find your BMI quickly and easily without doing this calculation.  How is BMI interpreted?  Health care professionals use BMI charts to identify whether an adult is underweight, at a normal weight, or overweight based on the following guidelines:  · Underweight: BMI less than 18.5.  · Normal weight: BMI between 18.5 and 24.9.  · Overweight: BMI between 25 and 29.9.  · Obese: BMI of 30 and above.  BMI is usually interpreted the same for males and females.  Weight includes both fat and muscle, so someone with a muscular build, such as an athlete, may have a BMI that is higher than 24.9. In cases like these, BMI may not accurately depict body fat. To determine if excess body fat is the cause of a BMI of 25 or higher, further assessments may need to be done by a health care provider.  Why is BMI a useful tool?  BMI is used to identify a possible weight problem that may be related to a medical problem or may increase the risk for medical problems. BMI can also be used to promote changes to reach a healthy weight.  This information is not intended to replace advice given to you by your health care provider. Make sure you discuss any questions you have with your health care provider.  Document Released: 08/29/2005 Document Revised: 04/27/2017 Document Reviewed: 05/15/2015  [employer] Interactive Patient Education © 2017 [employer]

## 2018-03-22 NOTE — TELEPHONE ENCOUNTER
From: Daphne Santos  To: Tre Sevilla MD  Sent: 3/21/2018 12:39 PM CDT  Subject: Non-Urgent Medical Question    Daphne has been complaining about her ears ringing, some days more than others. I wasn't sure what to do to help her. Should I take her to a ear Dr.?    She has also complained about dryness in the genital area. I have been having her to apply lub jell but she said it is not any better.  She has done this since the last antibiotic was given. She doesn't want to use the cream you suggested. Do we have any other option?  Ana Lilia, her sister.

## 2018-03-22 NOTE — TELEPHONE ENCOUNTER
Spoke to Ana Lilia and pt is getting more confused, did complain of back pain today, not sure if UTI is coming back? Did advise if wants good test would need mini cath urine. Ana Lilia will check on pt and see how she is feeling to see if this is really what is going on? ENT referral done and would prefer Dr Alanis, since he is local and increased difficulty getting pt out to appt

## 2018-03-22 NOTE — TELEPHONE ENCOUNTER
----- Message from Daphne Santos sent at 3/21/2018 12:39 PM CDT -----  Regarding: Non-Urgent Medical Question  Contact: [phone]  Daphne has been complaining about her ears ringing, some days more than others.  I wasn't sure what to do to help her.  Should I take her to a ear Dr.?    She has also complained about dryness in the genital area.  I have been having her to apply lub jell but she said it is not any better.  She has done this since the last antibiotic was given.  She doesn't want to use the cream you suggested.  Do we have any other option?  Ana Lilia, her sister.

## 2018-03-22 NOTE — TELEPHONE ENCOUNTER
Best would be brief; maybe one month light estrogen cream (not sure what is meant the cream we suggested)  Otherwise; all she can vaginally use is lubrication creams; and they are all over the counter    Yes; tinnitis; would be ENT

## 2018-04-20 NOTE — DI
EXAM:  Two views of the left knee 

  

HISTORY: Left knee pain. 

  

COMPARISON:  Left knee x-rays 11/13/2016 

  

FINDINGS: Medial and lateral compartments of the left knee are intact with mild medial compartmental 
narrowing.  The patella demonstrates patellar osteophytes.  There is no lytic or blastic lesion.  The
 soft tissues are unremarkable. 

  

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

## 2018-04-20 NOTE — PROGRESS NOTES
Daphne Santos is a 77 y.o. female presenting with chief complaint of:   Chief Complaint   Patient presents with   • Atrial Fibrillation   • Hypertension   • Hyperlipidemia   • Fatty Liver   • Hypothyroidism   • Anxiety   • Depression   • Frequent Falls       History of Present Illness :  With sister.   Has multiple chronic problems to consider that might have a bearing on today's issues;  an interval appointment.       1. Atrial fibrillation, unspecified type    2. Anticoagulated-ASA 81/DM; a fib deferred/fall risk    3. Paroxysmal atrial fibrillation    4. Congestive heart failure, unspecified congestive heart failure chronicity, unspecified congestive heart failure type    5. Fatty liver    6. Frequent falls    7. Gastroesophageal reflux disease without esophagitis    8. Gait abnormality    9. HTN (hypertension), benign    10. Mixed hyperlipidemia    11. Essential hypertension    12. Hypothyroidism, unspecified type    13. 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 Knee pain, unspecified chronicity, unspecified laterality    15. Abnormal finding of blood chemistry     16. Anxiety        Other chronic problem/s to consider:   HTN.  The HTN has been present for years/it is chronic.  The HTN is assumed essential/without testing needed to look for other.  The HTN is controlled manifest by todays blood pressure and no home monitoring.  Associated illness below.   Anxiety: This has been present for years/over a year.  It is chronic.  It is variable.  It is associated with stressors: her health.  Medications being used help.   Medications/Rx change not requested.  Depression: This has been present for years/over a year.  It is chronic.  It is variable.  It is associated with stressors: her health.   Medications being used help.  Medications/Rx change not requested.  No suicide ideation/intent.   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 It is chronic.  The arrthymia is  primarily history a fib   The rhythm is not associated with syncope/near syncope, dizziness, or weakness. Medications being used help.  Congestive heart failure/echo changes: This has been present for years/over a year.  It is chronic.  The CHF had features on echo of unclassified dysfunction.  This has been associated with SOB, LE edema on/off. Sees   Anticoagulation: Requires anticoagulation with ASA 81 for history CVA a fib and DM2; cannot with fall risk to more.   This needs to be continually monitored for risk/benefit.   History CVA/or TIA:  This happened 2015.  This makes it a chronic concern.  It was without bleed.  The neuro deficits are stable L hemiplegia and speech:    Degenerative Joint Disease/arthritis:  This has been present for years/over a year.  It is a chronic condition.  It causes on/off pain and joint stiffness.  There is no joint swelling;  mostly now L knee after a fall.   Gait decline: This has been present for years/over a year.  It is chronic.  It is influenced by post CVA., L hemiplegia and LE weakness, DJD.  Hyperlipidemia: This has been present for years/over a year.  It is chronic.   It is generally controlled.   .  Labs are needed periodic to monitor condition/safetly.   Rx therapy Lipitor  Hypothyroidism/abnormal thyroid lab: This has been present for years/over a year.  It is chronic.  It is stable as there has been stable labs and energy level, hair-skin texture, stooling, are all the same; no palpitations.. Compliant with Rx; labs are needed periodic to monitor stability.   Fatty liver: This was discovered years ago/it is chronic.  This problem is asymptomatic for pain, nausea.  Previously advised regular labs to follow this and there can be connection with cirrhosis/life threating liver disease.  Advised often helpful to normalize weight.   L knee pain:  Chronic and acute at times. Recent fall.     Has an/another acute issue today: .    The following portions of the patient's  history were reviewed and updated as appropriate: allergies, current medications, past family history, past medical history, past social history, past surgical history and problem list.  Records acquired and reviewed; TCC migrated.      Current Outpatient Prescriptions:   •  acetaminophen (TYLENOL) 325 MG tablet, Take 650 mg by mouth every night., Disp: , Rfl:   •  aspirin 81 MG EC tablet, Take 81 mg by mouth Daily. Last dose 9/19, Disp: , Rfl:   •  atorvastatin (LIPITOR) 40 MG tablet, TAKE ONE TABLET NIGHTLY, Disp: 90 tablet, Rfl: 1  •  calcium carbonate-vitamin d (CALCIUM 600+D) 600-400 MG-UNIT per tablet, Take 1 tablet by mouth 2 (two) times a day., Disp: , Rfl:   •  digoxin (LANOXIN) 125 MCG tablet, TAKE 1/2 TABLET DAILY, Disp: 15 tablet, Rfl: 5  •  fluticasone (FLONASE) 50 MCG/ACT nasal spray, USE 2 SPRAYS IN EACH NOSTRIL DAILY (Patient taking differently: USE 2 SPRAYS IN EACH NOSTRIL DAILY as needed), Disp: 16 mL, Rfl: 5  •  levETIRAcetam (KEPPRA) 1000 MG tablet, TAKE ONE TABLET TWICE DAILY, Disp: 60 tablet, Rfl: 5  •  lisinopril (PRINIVIL,ZESTRIL) 2.5 MG tablet, TAKE ONE TABLET DAILY, Disp: 90 tablet, Rfl: 1  •  Magnesium 200 MG tablet, Take 1 tablet by mouth Daily., Disp: , Rfl:   •  metoprolol succinate XL (TOPROL-XL) 25 MG 24 hr tablet, Take 0.5 tablets by mouth Daily., Disp: 45 tablet, Rfl: 1  •  Multiple Vitamins-Minerals (EYE VITAMINS PO), Take 1 tablet by mouth daily., Disp: , Rfl:   •  Multiple Vitamins-Minerals (MULTIVITAMIN ADULT PO), Take 1 tablet by mouth daily., Disp: , Rfl:   •  omeprazole (priLOSEC) 20 MG capsule, Take 1 capsule by mouth 2 (Two) Times a Day., Disp: 60 capsule, Rfl: 11  •  polyethylene glycol (MIRALAX) packet, Take 17 g by mouth daily., Disp: , Rfl:   •  potassium chloride (K-DUR,KLOR-CON) 20 MEQ CR tablet, TAKE ONE TABLET TWICE DAILY, Disp: 180 tablet, Rfl: 1  •  primidone (MYSOLINE) 50 MG tablet, TAKE TWO TABLETS THREE TIMES DAILY, Disp: 180 tablet, Rfl: 2  •  propafenone  (RYTHMOL) 150 MG tablet, TAKE ONE TABLET THREE TIMES DAILY, Disp: 270 tablet, Rfl: 1    No problems with medications.  Refills if needed done    Allergies   Allergen Reactions   • Contrast Dye Hives   • Nexium [Esomeprazole] Other (See Comments)     Doesn't remember       Review of Systems  GENERAL:  Inactive/slower with limits, speed, stamina for age and balance, strength. Sleep is ok. No fever now.  SKIN: No rash/skin lesion of concern: except bruise behind L ear.  ENDO:  No syncope, near or diaphoretic sweaty spells; just recent falls.  HEENT: No recent head injury; or headache,  No vision change, Same hearing loss.  Ears without pain/drainage.  No sore throat.  No nasal/sinus congestion/drainage. No epistaxis.  CHEST: No chest wall tenderness or mass. No choking/cough,  without wheeze.  No SOB; no hemoptysis.  CV: No chest pain, palpitations, ankle edema.  GI: No heartburn, dysphagia.  No abdominal pain, diarrhea, constipation.  No rectal bleeding, or melena.    :  Voids without dysuria, or change occ incontinence to completion.  ORTHO: No painful/swollen joints but various on /off sore.  No  sore neck or back.  No acute neck or back pain without recent injury.  NEURO: No dizziness, weakness of extremities.  Same  numbness/paresthesias.   PSYCH: Mild memory loss.  Mood good; often anxious, depressed but/and not suicidal.  Tries to tolerate stress .     Results for orders placed or performed in visit on 03/29/18   Urine Culture - Urine, Urine, Catheter   Result Value Ref Range    Urine Culture Final report     Result 1 No growth    Microscopic Examination   Result Value Ref Range    WBC, UA 0-2 (A) None Seen /HPF    RBC, UA 0-2 (A) None Seen /HPF    Epithelial Cells (non renal) 0-2 /HPF    Cast Type Comment     Bacteria, UA Comment None Seen /HPF   Urinalysis With Microscopic - Urine, Clean Catch   Result Value Ref Range    Specific Gravity, UA 1.020 1.005 - 1.030    pH, UA 6.0 5.0 - 8.0    Color, UA Yellow      "Appearance, UA Clear Clear    Leukocytes, UA Negative Negative    Protein Trace (A) Negative    Glucose, UA Negative Negative    Ketones Negative Negative    Blood, UA Negative Negative    Bilirubin, UA Negative Negative    Urobilinogen, UA Comment     Nitrite, UA Negative Negative       No results found for: PSA     Lab Results:  CBC:    Lab Results - Last 18 Months  Lab Units 09/23/17  0532 09/22/17  0548 09/05/17 0928 02/27/17 0957   WBC 10*3/mm3 10.42 11.07* 8.09 5.30   HEMOGLOBIN g/dL 13.9 13.2 14.9 15.0   HEMATOCRIT % 42.4 39.8 45.4 46.5   PLATELETS 10*3/mm3 197 189 195 199      BMP/CMP:    Lab Results - Last 18 Months  Lab Units 09/23/17  0532 09/22/17  0548 09/05/17 0928 02/27/17 0957   SODIUM mmol/L 141 139 139 139   POTASSIUM mmol/L 3.5 3.5 4.6 4.7   CHLORIDE mmol/L 105 105 104 103   CO2 mmol/L 27.0 26.0  --   --    TOTAL CO2, ARTERIAL mmol/L  --   --  24.0 26.0   GLUCOSE mg/dL  --   --  115* 88   BUN mg/dL 18 21 21 20   CREATININE mg/dL 0.76 0.82 0.71 0.74   EGFR IF NONAFRICN AM mL/min/1.73 74 68 80 77   EGFR IF AFRICN AM mL/min/1.73  --   --  97 93   CALCIUM mg/dL 8.5 8.7 9.5 9.6     HEPATIC:    Lab Results - Last 18 Months  Lab Units 09/22/17  0548 09/05/17 0928 02/27/17  0957   ALT (SGPT) U/L 39 33 38   AST (SGOT) U/L 34 28 35   ALK PHOS U/L 45 57 65     THYROID:    Lab Results - Last 18 Months  Lab Units 09/05/17 0928 02/27/17  0957   TSH mIU/mL 1.580 1.300     A1C:    Lab Results - Last 18 Months  Lab Units 09/05/17 0928   HEMOGLOBIN A1C % 5.50     PSA:No results for input(s): PSA in the last 68536 hours.    Objective   /76   Pulse 60   Temp 98.5 °F (36.9 °C) (Oral)   Resp 18   Ht 152.4 cm (60\")   Wt 69.4 kg (153 lb)   SpO2 97%   Breastfeeding? No   BMI 29.88 kg/m²   Body mass index is 29.88 kg/m².    Physical Exam  GENERAL:  Well nourished/developed in no acute distress. obese  SKIN: Turgor excellent, without wound, rash, lesion other than: PHOTO L posterior auricular " bruise.  HEENT: Normal cephalic without trauma.  Pupils equal round reactive to light. Extraocular motions full without nystagmus.   External canals nonobstructive nontender without reddness. Tymphatic membranes shilpi with elin structures intact.   Oral cavity without growths, exudates, and moist.  Posterior pharynx without mass, obstruction, redness.  No thyromegaly, mass, tenderness, lymphadenopathy and supple.  CV: Irregular irregular rhythm.  1/6 systolic murmur, no gallop, trace  edema. Posterior pulses intact.  No carotid bruits.  CHEST: No chest wall tenderness or mass.   LUNGS: Symmetric motion with clear to auscultation.  No dullness to percussion  ABD: Soft, nontender without mass.   ORTHO: Symmetric extremities without swelling/point tenderness.  Full gross range of motion. L knee sore medial joint line.  Walking with assistance rolling walker.  NEURO: L hemiplegia and L facial droop.  Speech garbled    PSYCH: Oriented x 3.  Pleasant calm, well kept.  Shallow but purposeful/directed conservation with intact short/long gross memory.     Assessment/Plan     1. Atrial fibrillation, unspecified type    2. Anticoagulated-ASA 81/DM; a fib deferred/fall risk    3. Paroxysmal atrial fibrillation    4. Congestive heart failure, unspecified congestive heart failure chronicity, unspecified congestive heart failure type    5. Fatty liver    6. Frequent falls    7. Gastroesophageal reflux disease without esophagitis    8. Gait abnormality    9. HTN (hypertension), benign    10. Mixed hyperlipidemia    11. Essential hypertension    12. Hypothyroidism, unspecified type    13. 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 Knee pain, unspecified chronicity, unspecified laterality    15. Abnormal finding of blood chemistry     16. Anxiety        Rx: reviewed/changes:  same    LAB/Testing/Referrals: reviewed/orders:   Today:   Orders Placed This Encounter   Procedures   • XR Knee 1 or 2 View Left   •  Hemoglobin A1c   • Basic Metabolic Panel   • Ambulatory Referral to Physical Therapy Evaluate and treat   • CBC & Differential     Usual:   6m CBC, A1c, BMP   12m CBC, A1c, CMP, TSH, T4, LIPID, Vit D, micro albumin    Discussions:   Body mass index is 29.88 kg/m².   Patient's Body mass index is 29.88 kg/m². BMI is above normal parameters. Follow-up plan includes:  not reasonable to address.  Non-smoker      There are no Patient Instructions on file for this visit.    Follow up: Return for lab today;, lab during/or just before next apt;, Dr Sevilla-, 6 m;.  Future Appointments  Date Time Provider Department Center   7/10/2018 9:30 AM Alfonso Treviño MD [facility] None   8/21/2018 9:40 AM CHRISTIANE Gaxiola [facility] None   10/26/2018 9:45 AM Tre Sevilla MD [facility] None

## 2018-05-07 NOTE — CT
EXAM:  CT head without contrast. 

  

HISTORY:  Initial presentation for head trauma. 

  

COMPARISON:  11/13/2016. 

  

TECHNIQUE:  Multiple axial images of the brain were obtained from the skull base through the vertex w
ithout intravenous contrast. Multiplanar reformats were provided. 

  

FINDINGS:  There is no intracranial hemorrhage or extraaxial collection.  Extensive encephalomalacia 
throughout the right middle cerebral artery territory again noted with ex vacuo dilatation of the rig
ht lateral ventricle.  The gray-white differentiation is maintained without evidence for acute large 
vascular territory infarction.  There are areas of periventricular and subcortical white matter low a
ttenuation.  The cortical sulci and cerebral ventricles are symmetrically enlarged.  The basal cister
ns are well visualized.  There is no hydrocephalus, mass effect, or midline shift.  The paranasal sin
uses and mastoid air cells are clear.  The calvarium is intact.  Atherosclerotic calcifications are p
resent.  There is soft tissue swelling and subcutaneous air in the posterior scalp with to skin stapl
es present to the left of midline. 

  

------------------------------- 

IMPRESSION: 

1.  Posterior scalp injury without acute intracranial abnormality. 

2.  Old right middle cerebral artery territory infarction. 

3.  Chronic small vessel ischemic changes and atrophy.

## 2018-05-07 NOTE — ED.PDOC
General


Stated Complaint: i fell at [facility]


Time Seen by Physician: 06:48


Mode of Arrival: Ambulance


Information Source: Patient, EMT


Exam Limitations: No limitations


Nursing and Triage Documentation Reviewed and Agree: Yes


Reviewed sepsis parameters & appropriate labs ordered?: Yes


System Inflammatory Response Syndrome: Not Applicable





<BJ KESSLER - Last Filed: 05/07/18 07:04>





<LEYLA SOL - Last Filed: 05/07/18 10:01>


ED Provider: 


Dr. LEYLA SOL





Chief Complaint: Fall


Primary Care Provider: 


LAUREN HONEYCUTT





Sepsis Protocol: 


For patient's 13 years and over:





Temp is 96.8 and below  and greater


Pulse >90 BPM


Resp >20/minute


Acutely Altered Mental Status





Are patient's symptoms suggestive of a new infection, such as:


   -Pneumonia


   -Skin, Soft Tissue


   -Endocarditis


   -UTI


   -Bone, Joint Infection


   -Implantable Device


   -Acute Abdominal Infection


   -Wound Infection


   -Meningitis


   -Blood Stream Catheter Infection


   -Unknown








Trauma/Injury Complaint Exam





- Head Injury Complaint/Exam


Location of Pain: Reports: Scalp


Mechanism of Injury: Reports: Trauma


Onset/Duration: one hour


Symptoms Are: Still present


Initial Severity: Mild


Current Severity: Mild


Character: Reports: Dull


Aggravating: Reports: None


Alleviating: Reports: None


Associated Signs and Symptoms: Denies: Confusion, Memory loss, Seizure, 

Epistaxis, Dental malocclusion, Neck pain, Nausea, Vomiting


Loss of Consciousness: None


SDH Risk Factors: Present: Elderly, Recent trauma, Anticoagulant use


Cervical Spine Injury Risk Factors: Present: None


Immobilization Removed Post Exam: No


Head Injury Findings: Present: Normal findings


Glascow Coma Scale (see protocol): 15


Focal Weakness: Present: None


Focal Sensory Loss: Present: None


Gait: Normal


Gag Reflex Present: Yes


Finger to Nose: Normal


Nexus Low Risk Criteria: No Altered LOC, No focal neuro deficit


Differential Diagnoses: Trauma





<BJ KESSLER - Last Filed: 05/07/18 07:04>





Review of Systems





- Review Of Systems


Constitutional: Reports: No symptoms


Eyes: Reports: No symptoms


Ears, Nose, Mouth, Throat: Reports: No symptoms


Respiratory: Reports: No symptoms


Cardiac: Reports: No symptoms


GI: Reports: No symptoms


: Reports: No symptoms


Musculoskeletal: Reports: No symptoms


Skin: Reports: No symptoms


Neurological: Reports: Headache


Endocrine: Reports: No symptoms


Hematologic/Lymphatic: Reports: No symptoms


All Other Systems: Reviewed and Negative





<BJ KESSLER - Last Filed: 05/07/18 07:04>





Past Medical History





- Past Medical History


Previously Healthy: No


Endocrine: Reports: DM 2, Hypothyroid


Cardiovascular: Reports: Hypertension


Respiratory: Reports: None


Hematological: Reports: None


Gastrointestinal: Reports: None


Genitourinary: Reports: None


Neuro/Psych: Reports: CVA, Anxiety, Depression, Seizure


Musculoskeletal: Reports: None


Cancer: Reports: None





- Surgical History


General Surgical History: Reports: Hysterectomy, Other (thyroid surgery )





- Family History


Family History: Reports: Unknown





- Social History


Smoking Status: Never smoker


Hx Substance Use: No


Alcohol Screening: None





<BJ KESSLER - Last Filed: 05/07/18 07:04>





Physical Exam





- Physical Exam


Appearance: Well-appearing


Eyes: PRATIBHA


ENT: Ears normal, Nose normal, Oropharynx normal


Neck: Supple


Respiratory: Airway patent, Breath sounds clear, Breath sounds equal, 

Respirations nonlabored


Cardiovascular: RRR, Pulses normal, No rub, No murmur


GI/: Soft, Nontender, No masses, Bowel sounds normal, No Organomegaly


Musculoskeletal: Normal strength, ROM intact, No edema, No calf tenderness


Skin: Warm, Dry, Normal color


Neurological: Sensation intact, Motor intact, Reflexes intact, Cranial nerves 

intact, Alert, Oriented


Psychiatric: Affect appropriate, Mood appropriate





<BJ KESSLER Last Filed: 05/07/18 07:04>





Interpretation





- Radiology Interpretation


Radiology Interpretation By: Radiologist


Radiology Results: Negative


Exam Interpreted: CT Scan





<BJ KESSLER - Last Filed: 05/07/18 07:04>





- Radiology Interpretation


Radiology Interpretation By: Radiologist


Radiology Results: Negative


Exam Interpreted: CT Scan





- EKG Interpretation


Rate: Tachy (ATRIAL FIBRILLATION WITH RVR )


Time of EKG #2: 10:00


Rate: Tachy (AFIB CONTROLLED RATE )


Ectopy: None


Axis: NL


EKG Comparison: Other (AFIB ON SECOND EKG IS CONTROLLED NO ACUTE  CHANGES NOTED)





<LEYLA SOL - Last Filed: 05/07/18 10:01>





Procedures





- Laceration/Wound Repair


  ** No standard instances


Wound Description: Linear


Wound Length (cm): 1.5


Wound Explored: Clean


Wound Irrigated: No


Wound Prep: Hibiclens


Wound Repaired With: Staples


Number of Staples: 4


Layer Closure?: No


Sterile Dressing Applied?: Yes


Splint Applied?: No


Sling Applied?: No





<BJ KESSLER - Last Filed: 05/07/18 07:04>





Re-Evaluation





- Re-Evaluation


Time of Re-Evaluation: 07:00


Status: Unchanged


Vital Signs Stable: Yes


Pain Level: 0


Appearance: NAD


Lungs: Clear


Skin: Warm and Dry


CV: Other (afib RVR)





- Re-Evaluation


Time of Re-Evaluation: 09:59


Status: Improved


Vital Signs Stable: Yes


Pain Level: 0


Appearance: NAD


Skin: Warm and Dry


Neuro: Alert and Oriented X3


CV: Other (AFIB RVR)





<LEYLA SOL - Last Filed: 05/07/18 10:01>





Physician Notification





- Case Discussed


Physician Notified: dr sol


Time of Notification: 07:05





<BJ KESSLER - Last Filed: 05/07/18 07:04>





- Case Discussed


Physician Notified: pmd


Time of Notification: 09:59


Admit To: Inpatient





<LEYLA SOL - Last Filed: 05/07/18 10:01>





Critical Care Note





- Critical Care Note


Total Time (mins): 0





<BJ KESSLER - Last Filed: 05/07/18 07:04>





Course





- Course


Hematology/Chemistry: 


 05/07/18 07:10





 05/07/18 07:10





<LEYLA SOL - Last Filed: 05/07/18 10:01>





- Course


Orders, Labs, Meds: 





Lab Review











  05/07/18 05/07/18 05/07/18





  07:10 07:10 07:10


 


WBC  8.99  


 


RBC  5.65 H  


 


Hgb  16.2 H  


 


Hct  47.8 H  


 


MCV  84.6  


 


MCH  28.7  


 


MCHC  33.9  


 


RDW Coeff of Елена  12.7  


 


Plt Count  238  


 


Immature Gran % (Auto)  0.3  


 


Neut % (Auto)  63.9  


 


Lymph % (Auto)  25.6  


 


Mono % (Auto)  6.7  


 


Eos % (Auto)  2.7  


 


Baso % (Auto)  0.8  


 


Immature Gran # (Auto)  0.0  


 


Neut # (Auto)  5.8  


 


Lymph # (Auto)  2.3  


 


Mono # (Auto)  0.6  


 


Eos # (Auto)  0.2  


 


Baso # (Auto)  0.1  


 


Sodium   140 


 


Potassium   4.2 


 


Chloride   103 


 


Carbon Dioxide   23 


 


Anion Gap   18.2 


 


BUN   17 


 


Creatinine   0.89 


 


Estimated GFR (MDRD)   62.00 


 


BUN/Creatinine Ratio   19.10 


 


Glucose   129 H 


 


Calcium   9.7 


 


TSH    1.399


 


Free T4    1.13


 


Digoxin   < 0.30 L 








Orders











 Category Date Time Status


 


 EKG-(ED ONLY) Stat CARDIO  05/07/18 07:02 Completed


 


 EKG-(ED ONLY) Stat CARDIO  05/07/18 07:20 Completed


 


 EKG-(IP & OP ONLY) DAILY CARDIO  05/08/18 06:00 Ordered


 


 EKG-(IP & OP ONLY) DAILY CARDIO  05/09/18 06:00 Ordered


 


 EKG-(IP & OP ONLY) DAILY CARDIO  05/10/18 06:00 Ordered


 


 ACTIVITY .Complete BR CARE  05/07/18 09:56 Ordered


 


 BLOOD GLUCOSE MONITORING ACCUCHECK Q6H CARE  05/07/18 09:56 Ordered


 


 Neuro Check [NEUROLOGICAL CHECKS] Q4HR CARE  05/07/18 09:58 Ordered


 


 VITAL SIGNS Q4HR CARE  05/07/18 09:56 Ordered


 


 REGULAR DIET DIETARY  05/07/18 Lunch Ordered


 


 Cardiac Monitor [ED CARDIAC MONITOR APPLIED] .ONCE EMERGENCY  05/07/18 07:03 

Active


 


 IV [ED IV/MEDIPORT/POWERPORT] .ONCE EMERGENCY  05/07/18 07:02 Active


 


 BASIC METABOLIC PANEL Stat LAB  05/07/18 07:10 Completed


 


 CBC W/ AUTO DIFF DAILY@0600 LAB  05/08/18 06:00 Ordered


 


 CBC W/ AUTO DIFF DAILY@0600 LAB  05/09/18 06:00 Ordered


 


 CBC W/ AUTO DIFF Stat LAB  05/07/18 07:10 Completed


 


 COMPREHENSIVE METABOLIC PANEL DAILY@0600 LAB  05/08/18 06:00 Ordered


 


 COMPREHENSIVE METABOLIC PANEL DAILY@0600 LAB  05/09/18 06:00 Ordered


 


 CREATINE KINASE Q8H LAB  05/07/18 16:00 Ordered


 


 CREATINE KINASE Q8H LAB  05/08/18 00:00 Ordered


 


 DIGOXIN Stat LAB  05/07/18 07:10 Completed


 


 FREE T4 (FREE THYROXINE) Stat LAB  05/07/18 07:10 Completed


 


 TROPONIN I Q8H LAB  05/07/18 16:00 Ordered


 


 TROPONIN I Q8H LAB  05/08/18 00:00 Ordered


 


 TSH [THYROID STIMULATING HORMONE] Stat LAB  05/07/18 07:10 Completed


 


 UA [URINALYSIS C & S IF INDICATED] Stat LAB  05/07/18 07:21 Uncollected


 


 0.9 % Sodium Chloride [Saline Flush] MEDS  05/07/18 07:02 Active





 1 syr IVF PRN PRN   


 


 0.9 % Sodium Chloride [Sodium Chloride] 100 ml MEDS  05/07/18 07:30 Active





 Diltiazem HCl Inj [Cardizem Inj] 125 mg   





 IV 5 mg/hr   


 


 Amiodarone HCl Inj [Cordarone] MEDS  05/07/18 07:13 Discontinued





 150 mg .ROUTE .STK-MED ONE   


 


 Aspirin [Aspirin Chewable] MEDS  05/08/18 08:00 Ordered





 81 mg PO DAILYWM   


 


 Atorvastatin Calcium [Atorvastatin Calcium] MEDS  05/07/18 21:00 Ordered





 40 mg PO BEDTIME   


 


 Calcium Carbonate/Vitamin D3 [Calcium 600 + Vit D 400 MEDS  05/07/18 21:00 

Ordered





 Tablet]   





 1 each PO BID   


 


 Digoxin Inj [Lanoxin] MEDS  05/07/18 07:03 Discontinued





 250 mcg IVP ONCE STA   


 


 Digoxin [Lanoxin] MEDS  05/08/18 09:00 Ordered





 62.5 mcg PO DAILY   


 


 Diltiazem HCl Inj [Cardizem Inj] MEDS  05/07/18 07:18 Discontinued





 20 mg IVP ONCE STA   


 


 Diltiazem HCl Inj [Cardizem Inj] MEDS  05/07/18 07:38 Discontinued





 25 mg IVP ONCE STA   


 


 Diltiazem HCl [Cardizem Inj] MEDS  05/07/18 07:17 Discontinued





 25 mg .ROUTE .STK-MED ONE   


 


 Levetiracetam [Levetiracetam] MEDS  05/07/18 21:00 Ordered





 1,000 mg PO BID   


 


 Lidocaine HCl/Pf [Lidocaine HCl 1% Sdv] MEDS  05/07/18 06:43 Discontinued





 5 ml .ROUTE .STK-MED ONE   


 


 Lisinopril [Zestril] MEDS  05/08/18 09:00 Ordered





 2.5 mg PO DAILY   


 


 Magnesium [Magnesium] MEDS  05/08/18 09:00 Ordered





 200 mg PO DAILY   


 


 Metoprolol Succinate [Toprol Xl] MEDS  05/08/18 09:00 Ordered





 12.5 mg PO DAILY   


 


 Omeprazole [Prilosec] MEDS  05/08/18 06:30 Ordered





 20 mg PO QDAC   


 


 Potassium Chloride [K-Dur] MEDS  05/07/18 21:00 Ordered





 20 meq PO BID   


 


 Primidone [Mysoline] MEDS  05/07/18 15:00 Ordered





 100 mg PO TID   


 


 Propafenone HCl [Rythmol Sr] MEDS  05/07/18 15:00 Ordered





 150 mg PO TID   


 


 CT CERVICAL SPINE W/O CONTRAST Routine RADS  05/07/18 08:12 Completed


 


 CT HEAD W/O CONTRAST Routine RADS  05/07/18 08:12 Completed








Medications











Generic Name Dose Route Start Last Admin





  Trade Name Freq  PRN Reason Stop Dose Admin


 


Aspirin  81 mg  05/08/18 08:00  





  Aspirin Chewable  PO   





  DAILYWM SYDNEE   





     





     





     





     


 


Digoxin  62.5 mcg  05/08/18 09:00  





  Lanoxin  PO   





  DAILY SYDNEE   





     





     





     





     


 


Diltiazem HCl 125 mg/ Sodium  125 mls @ 5 mls/hr  05/07/18 07:30  





  Chloride  IV   





  .Q24H [facility]   





     





     





  Protocol   





  5 MG/HR   


 


Metoprolol Succinate  12.5 mg  05/08/18 09:00  





  Toprol Xl  PO   





  DAILY SYDNEE   





     





     





     





     


 


Non-Formulary Medication  40 mg  05/07/18 21:00  





  Atorvastatin Calcium [Atorvastatin Calcium]  PO   





  BEDTIME SYDNEE   





     





     





     





     


 


Non-Formulary Medication  1 each  05/07/18 21:00  





  Calcium Carbonate/Vitamin D3 [Calcium 600 + Vit D 400 Tablet]  PO   





  BID SYDNEE   





     





     





     





     


 


Non-Formulary Medication  1,000 mg  05/07/18 21:00  





  Levetiracetam [Levetiracetam]  PO   





  BID SYDNEE   





     





     





     





     


 


Non-Formulary Medication  200 mg  05/08/18 09:00  





  Magnesium [Magnesium]  PO   





  DAILY SYDNEE   





     





     





     





     


 


Non-Formulary Medication  100 mg  05/07/18 15:00  





  Primidone [Mysoline]  PO   





  TID SYDNEE   





     





     





     





     


 


Non-Formulary Medication  150 mg  05/07/18 15:00  





  Propafenone Hcl [Rythmol Sr]  PO   





  TID SYDNEE   





     





     





     





     


 


Non-Formulary Medication  2.5 mg  05/08/18 09:00  





  Lisinopril [Zestril]  PO   





  DAILY [facility]   





     





     





     





     


 


Omeprazole  20 mg  05/08/18 06:30  





  Prilosec  PO   





  QDAC SYDNEE   





     





     





     





     


 


Potassium Chloride  20 meq  05/07/18 21:00  





  K-Dur  PO   





  BID [facility]   





     





     





     





     


 


Sodium Chloride  1 syr  05/07/18 07:02  05/07/18 07:34





  Saline Flush  IVF   1 syr





  PRN PRN   Administration





  To flush IV   





     





     





     














Discontinued Medications














Generic Name Dose Route Start Last Admin





  Trade Name Freq  PRN Reason Stop Dose Admin


 


Digoxin  250 mcg  05/07/18 07:03  05/07/18 07:15





  Lanoxin  IVP  05/07/18 07:04  250 mcg





  ONCE STA   Administration





     





     





     





     


 


Diltiazem HCl  20 mg  05/07/18 07:18  05/07/18 07:31





  Cardizem Inj  IVP  05/07/18 07:19  Not Given





  ONCE STA   





     





     





     





     


 


Diltiazem HCl  25 mg  05/07/18 07:38  05/07/18 07:39





  Cardizem Inj  IVP  05/07/18 07:39  Not Given





  ONCE STA   





     





     





     





     











Vital Signs: 





 











  Temp Pulse Resp BP Pulse Ox


 


 05/07/18 07:15   180 H   


 


 05/07/18 07:00  97.1 F L  173 H  28 H  149/117 H  98














Departure





- Departure


Time of Disposition: 06:52


Pt referred to PMD for follow-up: Yes


IPMP verified?: No


Disposition Discussed With: Patient





<BJ KESSLER - Last Filed: 05/07/18 07:04>





<LEYLA SOL - Last Filed: 05/07/18 10:01>





- Departure


Disposition: ADMITTED AS INPATIENT


Discharge Problem: 


 Atrial fibrillation with RVR





Closed head injury


Qualifiers:


 Encounter type: initial encounter Qualified Code(s): S09.90XA - Unspecified 

injury of head, initial encounter





Instructions:  Laceration (ED), Staple Care (ED)


Condition: Good


Additional Instructions: 


Please call your Family Physician as soon as possible to schedule a follow-up 

appointment.


Allergies/Adverse Reactions: 


Allergies





Iodine and Iodide Containing Produc Adverse Reaction (Verified 05/07/18 07:08)


 








Home Medications: 


Ambulatory Orders





Acetaminophen [Mapap] 325 mg PO Q4HR PRN 12/03/15 


Aspirin [Aspirin Chewable] 81 mg PO DAILYWM 12/03/15 


Atorvastatin Calcium 40 mg PO BEDTIME 12/03/15 


Lisinopril [Zestril] 2.5 mg PO DAILY 12/03/15 


Metoprolol Succinate [Toprol Xl] 12.5 mg PO DAILY 12/03/15 


Omeprazole [Prilosec] 20 mg PO QDAC 12/03/15 


Polyethylene Glycol 3350 [Miralax] 17 gm PO DAILY 12/03/15 


Potassium Chloride [K-Dur] 20 meq PO BID 12/03/15 


Primidone [Mysoline] 100 mg PO TID 12/03/15 


Levetiracetam 1,000 mg PO BID 04/04/18 


Magnesium 200 mg PO DAILY 04/04/18 


Propafenone HCl [Rythmol Sr] 150 mg PO TID 04/04/18 


Bisacodyl 10 mg RC PRN PRN 05/07/18 


Calcium Carbonate/Vitamin D3 [Calcium 600 + Vit D 400 Tablet] 1 each PO BID 05/ 07/18 


Digoxin [Lanoxin] 0.5 tab PO DAILY 05/07/18 


Estrogens, Conjugated [Premarin Vaginal Cream] 1 applic VG PRN PRN 05/07/18 


Magnesium Hydroxide [Milk of Magnesia] 30 ml PO PRN PRN 05/07/18 


Vit C/Vit E/Lutein/Min/Omega-3 [Ocuvite Softgel] 1 each PO DAILY 05/07/18

## 2018-05-08 NOTE — PROGRESS NOTES
"HISTORY AND PHYSICIAL  [facility]    Patient ID: Daphne Santos  MRN: [id number]     Acct:  [id number]  Admit Date: 5/7/2018  Date of service: 5/7/2018    SOURCE: The source of this information is prior knowledge of the patient, review of her office records, her current chart, as well as discussion with she/sister and ER personal; she is considered reliable.      PATIENT PROFILE: The patient is a 78 y/o white single female resident of [facility]; she was cooperative.      CHIEF COMPLAINT: \"fall, head contusion, SVT\"     HISTORY OF PRESENT ILLNESS: She has poor gait with multiple medical problems.  She after a recent fall was sent to PT and was walking better.  She got up the morning of admit and was going to the kitchen; she fell striking the back of her head without LOC.  She went to [facility] ED and had staples to her occipital laceration.  She had unremarkable imaging of C spine and CT head.  She was found to have a tachycardia with chronic a fib and low dig level.  She was started on Cardizem drip and given bolus of lanoxin.  She was admitted to control heart rhythm, adjust Rx, and have neuro observation.  I am concerned she may need admit to rehab after this.  PT will be involved to help decide this.     Allergies   Allergen Reactions   • Contrast Dye Hives   • Nexium [Esomeprazole] Other (See Comments)     Doesn't remember       HOME MEDICATIONS:  Prior to Admission medications    Medication Sig Start Date End Date Taking? Authorizing Provider   acetaminophen (TYLENOL) 325 MG tablet Take 650 mg by mouth every night.    Historical Provider, MD   aspirin 81 MG EC tablet Take 81 mg by mouth Daily. Last dose 9/19    Historical Provider, MD   atorvastatin (LIPITOR) 40 MG tablet TAKE ONE TABLET NIGHTLY 1/3/18   Tre Sevilla MD   calcium carbonate-vitamin d (CALCIUM 600+D) 600-400 MG-UNIT per tablet Take 1 tablet by mouth 2 (two) times a day.    Historical Provider, MD   digoxin (LANOXIN) 125 MCG " tablet TAKE 1/2 TABLET DAILY 2/27/18   Tre Sevilla MD   fluticasone (FLONASE) 50 MCG/ACT nasal spray USE 2 SPRAYS IN EACH NOSTRIL DAILY  Patient taking differently: USE 2 SPRAYS IN EACH NOSTRIL DAILY as needed 6/1/17   Tre Sevilla MD   levETIRAcetam (KEPPRA) 1000 MG tablet TAKE ONE TABLET TWICE DAILY 11/10/17   CHRISTIANE Gaxiola   lisinopril (PRINIVIL,ZESTRIL) 2.5 MG tablet TAKE ONE TABLET DAILY 3/16/18   Tre Sevilla MD   Magnesium 200 MG tablet Take 1 tablet by mouth Daily.    Historical Provider, MD   metoprolol succinate XL (TOPROL-XL) 25 MG 24 hr tablet Take 0.5 tablets by mouth Daily. 1/10/18   Alfonso Treviño MD   Multiple Vitamins-Minerals (EYE VITAMINS PO) Take 1 tablet by mouth daily.    Historical Provider, MD   Multiple Vitamins-Minerals (MULTIVITAMIN ADULT PO) Take 1 tablet by mouth daily.    Historical Provider, MD   omeprazole (priLOSEC) 20 MG capsule Take 1 capsule by mouth 2 (Two) Times a Day. 8/9/17   CHRISTIANE Ortiz   polyethylene glycol (MIRALAX) packet Take 17 g by mouth daily.    Historical Provider, MD   potassium chloride (K-DUR,KLOR-CON) 20 MEQ CR tablet TAKE ONE TABLET TWICE DAILY 3/16/18   Tre Sevilla MD   primidone (MYSOLINE) 50 MG tablet TAKE TWO TABLETS THREE TIMES DAILY 3/21/18   CHRISTIANE Gaxiola   propafenone (RYTHMOL) 150 MG tablet TAKE ONE TABLET THREE TIMES DAILY 3/16/18   Tre Sevilla MD       PAST HISTORY:  CHILDHOOD: unremarkable.     PROCEDURES:   SCANNED  G none    EGD+bx/[facility]//7-23-08  Colonoscopy+polyp-biop/hem/[facility]///  7-23-08/1-09/4-2012  Colonoscopy+polyp-hem/[facility]//4-1-09/3y  Cath-38% LAD/Kamila/[facility]/3.1.12  Colonoscopy+polyp-div/[facility]//4.13.12/5y  T drainage/Brown/[facility]/12.7.15     SURGERIES:  DEMI+BSO/Carol/DAVID/6-  R Partial thyroid/Priyank/DAVID/1-  R cataract/Rose/4-19-06    FAMILY  "HISTORY:  Heart/m,  HTN/m,f,si  DM/m,f,si,gm-p,  Thyroid/m,  CA-colon/none  CA-breast/gm-m  CA-pancreas/f  CA-Hodgkins/a-p  CA-prostate/u-p  CA-other/none    HABITS:  Tobacco-smoker/none    SOCIAL HISTORY:   Children/none  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 neurogenic syncope    2.27.12/2.28.12 - Echo-LVH 65%, diastolic dysfunction    HOSPITAL NOTES: [facility]  4.10.15-4.20.15  CVA R-ischemic   L hemiplegia  slurred speech  dysphagia  gait decline-acute  fall-without injury  hypertension  bradycardia-toprol excerbated  atrial fib-paroxysmal   nonsustained ventricular tachycardia  carotid bruit R (neg scan 4.6.15)  anticoagulation (ASA 81-past A fib)-holding till 4.20   xarelto  diastolic dysfunction  famial tremor  fever 100.9 without cause  hypopotassemia-diuretic influenced  weight loss-diet influenced  hypoalbumenia/proteinemia-diet influenced  elevated fasting glucose    11.5.01 [facility] holter sinus/ avg 73, rare PAC, PVC  2003/episode A fib    4.10.15 AP pelvis R > L hip DJD  4.10.15 CT CERVICAL SPINE WO CONTRAST-mild DJD/nothing acute  4.10.15/4.13.15 - [facility] QT  QTc 424/457  4.10.15 CT head without-nothing acute  4.13.15/4.13.15 - [facility]  QT QTc 352/461  4.11.15 MR brain-acute R MCA   4.11.15 CT HEAD WO CONTRAST- R MCA distribution evolving infarct  4.11.15 CXR new L subclavian C line  4.13.15/4.13.15 - [facility]  QT QTc 352/461    4.13.15 call recieved from Dr Lopez, \"I spoke with Dr Mobley and do not think pt is a canidated for Xarelto since she had such a large stroke, I will be leaving and going out of town late " "leif and Dr Mobley will be following\"/le..ro/noted..ro  4.13.15 CT head R MCA CVA/4 mm R to L shift/no hemorrhage  4.16.15 swallow study..aspiration thin barium/no cough reflux..  4.16.15  mod/severe oral, mod pharyngeal dysphagia with penetration...pureed/nectar thick, meds crushed/applesause, ice chips between, speech    5.27.15 OFFICE - NEUROLOGY SPEC OFF NOTE-Brown-no change    HOSPITAL NOTES: [facility]   9.29.15-10.2.15  seizure-post CVA 4.2015  contusion-LUE  pain-L shoulder-contusion/sprain  pain-L elbow-contusion/sprain  CVA W-daxdutkt-8.10.15   L hemiplegia  slurred speech  dysphagia  gait decline-acute  gait decline-chronic  hypertension  bradycardia-toprol excerbated  atrial fib-paroxysmal   nonsustained ventricular tachycardia  carotid bruit R (neg scan 4.6.15)  anticoagulation (ASA 81-past A fib)-now ASA+    xarelto/A fib+CVA  diastolic dysfunction  famial tremor    9.29.15 Neurodiagnostic ugyhbx-DZV-lwxdoankwys slowing/encephalopathy vs posictal..intermittent rhythmic/semirhytmic triphasiform activity..    HOSPITAL NOTES: [facility]  12.3.15-12.12.15  hemothorax L  rib fractures L 7, 8, 9th  chest wall pain  fall  gait decline-acute  gait decline-chronic  constipation-acute  constipation-chronic  constipation-acute-narcotic influenced  seizure-post CVA 4.2015  CVA C-krwipjqu-2.10.15   L hemiplegia  slurred speech  dysphagia  hypertension  bradycardia-toprol excerbated  atrial fib-paroxysmal   nonsustained ventricular tachycardia  carotid bruit R (neg scan 4.6.15)  anticoagulation (ASA 81-past A fib)-now ASA+      xarelto/A fib+CVA-back to ASA alone  diastolic dysfunction  famial tremor    12.03.15/12.04/15  [facility]   ER outside lab UA 10-20 WBC 5-10 epi  12.03.15/12.04/15  [facility] CT scan of the chest w/o contrast L 7, 8, 9th rib/mod L hemothorax  12.03.15/12.04/15  [facility]   ER culuture neg    12.5.15 CT CHEST WO CONTRAST-L 7,8,9th rib fx/hemothorax  12.8.15 XR CHEST 1 VIEW PORTABLE-L chest tube/no pneumothorax  12.7.15 T " drainage/Brown/[facility]/12.7.15   12.3.15/12.7.15 [facility] ER Urine Culture-neg  12.6.15/12.7.15 [facility] ER QT/QTc 364/464 EKG   12.8.15/12.10.15 [facility] QT/QTc 288/467 EKG..A fib  12.9.15 XR CHEST 1 VIEW PORTABLE-L chest tube out..sl increase L effusion/though small    HOSPITAL NOTES: [facility] [place]  5.4.16-5.10.16  seizure-LUE tremor  tremor-LLE  familial tremor-UE/head  gait decline-acute (could not use walker with     LUE tremor)  gait decline-chronic  constipation-chronic  seizure-post CVA 4.2015  CVA P-wungnggv-5.10.15   L hemiplegia  slurred speech  dysphagia  hypertension  bradycardia-toprol excerbated  atrial fib-paroxysmal   nonsustained ventricular tachycardia  carotid bruit R (neg scan 4.6.15)  anticoagulation (ASA 81-past A fib)-now ASA+        xarelto/A fib+CVA-back to ASA alone  congestive heart failure-chronic-diastolic  microcytosis-followed    5.5.16 MR BRAIN WO CONTRAST-old R MCA CVA..mild atroph/nothing new  5.8.16 Neurodiagnostic jpirre-CMV-udaabipu -? structural lesion/CVA old..vs cortical irritibility    HOSPITAL NOTES: [facility] [place]  6.8.16-6.9.16  L humeral fracture  contusion-L periorbital  hematoma-L periorbital  fall  seizure-post CVA 4.2015  CVA R-gjwbovfq-9.10.15   L hemiplegia  slurred speech  dysphagia  seizure-LUE tremor  tremor-LLE  familial tremor-UE/head  gait decline-acute (could not use walker with     LUE tremor)  gait decline-chronic  constipation-chronic  dysphagia  hypertension  bradycardia-toprol excerbated  atrial fib-paroxysmal   nonsustained ventricular tachycardia  carotid bruit R (neg scan 4.6.15)  anticoagulation (ASA 81-past A fib)-now ASA+         xarelto/A fib+CVA-back to ASA alone  congestive heart failure-chronic-diastolic  microcytosis-followed    [facility]:   None    [facility]:   None    GENERAL:  Inactive/slower with limits, speed, stamina for age and gait. Sleep is ok. No fever.  ENDO:  No syncope, near or diaphoretic sweaty spells.  HEENT: Head contusion and mild occipital headache,  No  "vision change,  Same hearing loss.  Ears without pain/drainage.  No sore throat.  No nasal/sinus congestion/drainage. No epistaxis.  CHEST: No chest wall tenderness or mass. No cough, wheeze, SOB; no hemoptysis.  CV: No chest pain,  ankle edema: occ palpitations.  GI: No heartburn, dysphagia.  No abdominal pain, diarrhea, constipation, rectal bleeding, or melena.  :  Voids without dysuria, or incontinence to completion.  ORTHO: No painful/swollen joints but various on /off sore.  No sore neck or back.  No acute neck or back pain without recent injury.   NEURO: No dizziness, weakness of extremities.  Same numbness/paresthesias.   PSYCH: No memory loss.  Mood good; mod anxious, depressed but/and not suicidal.      PHYSICAL EXAMINATION:  Wt 157  Ht 5'2\" T 98.4 P 104 RR 18 /82    GENERAL:  Well nourished/developed in no acute distress.   SKIN: Turgor excellent, without wound, rash, lesion beyond staples occipital scalp.  HEENT: Normal cephalic without trauma otherwise.  Pupils equal round reactive to light. Extraocular motions full without nystagmus. Oral cavity without growths, exudates, and moist.  Posterior pharnyx without mass, obstruction, reddness.  No thyroidmegaly, mass, tenderness, lymphadenopathy and supple.  CV: Regular rhythm.  No murmur, gallop,  edema. Posterior pulses intact.  No carotid bruits.  CHEST: No chest wall tenderness or mass.   LUNGS: Symmetric motion with clear to auscultation.    ABD: Soft, nontender without mass.   ORTHO: Symmetric extremities without swelling/point tenderness.  Full gross range of motion.    NEURO: CN 2-12 grossly intact.  Symmetric facies.  UE/LE   2-3/5 strength throughout.  L hemiplegia/focal use extremities. Speech slightly dysarthric.    PSYCH: Oriented x 3.  Tearful on/off but able to be consoled.   Purposeful/directed conservation with intact short/long gross memory.     ASSESSMENT/PROBLEM LIST:   78 y/o white female  Allergy/intolerance: see " above  Procedural history: see above  Family history: see above  G0  Menopausal  obesity  Hypertension  Hyperlipidemia  Congestive heart failure-diastolic  Cardiac arrhythmia-a fib, bradyarrhythmia  Anticoagulated DM2, CVA, carotid/ASA 81 (coumadin-fall risk)  R carotid bruit  Elevated fasting glucose  Fatty liver  GE reflux  Colon polyps  Hemorrhoids  Chronic constipation  History CVA 4/2015 (R ischemic) (speech, gait, L hemiparesis)  Late effects CVA  Speech deficits  L hemiplegia  Gait decline  Frequent falls  Seizure disorder (post CVA 9.29.15)-Keppra treated  keppra treatment  Familial tremor  Degenerative joint disease  Hypothyroidism (partial thyroidectomy)  Anxiety-chronic  Depression-chronic  Vitamin D deficiency  Macular degeneration  Poor vision    REASON FOR ADMISSION:    Head contusion  Scalp laceration  Tachycardia-rapid a fib  Gait decline-acute  Fall-acute    PLANS:   Rx-reviewed; ordered as needed and will review daily/as needed.   Includes  anticoagulation for DVT prevention by SCD  LAB-reviewed; ordered as needed and will review daily.  Particular:  Daily CBC, chemistry  Imaging-reviewed; ordered as needed and will review daily.  Particular:  Available for  repeat CT head if needed  Consults none  Diet: AHA  Fluids: gentle  Special issues: neuro checks and gait review tomorrow  DC planning: she expects hal delacruz; may have to be rehab  Code status: full

## 2018-05-08 NOTE — RS.PTINEVL
Subjective





- Patient information


Date of Evaluation: 05/08/18


Date of Arrival on Unit: 05/07/18


Admitted From:: Home ([facility])


Diagnosis: afib with RVR, s/p fall with scalp laceration.


Usual Living Arrangement: [facility]


Home Environment: Level/No stairs


Medical History: Hypertension, CVA/TIA, Diabetes


Medical History Comments:: hypothyroid, depression, anxiety, seizure


LATEX ALLERGY?: No


Surgical History: Hysterectomy


Medications: see chart


Subjective Information/ Patient Comments:: pt states that she is ready to get 

up and moving.





- Level of function


Prior to this admission, the patient could do the following:: Independent 

Selfcare, Independent ADL's, Independent Ambulation


Current Level of Function: Partially Dependent


Current Equipment Used at Home: rollator rwx





Pain Assessement





- Location


  ** head


Description: Acute


Intensity: 2


Pain Alleviating Factors: Medication


Effects of Pain: pt c/o soreness at area of laceration





Interventions





- Objective


Patient Orientation: Person, Place, Time


Current Interventions: IV's, Telemetry


Observation: pt with laceration with staples to back of head





Range of Motion





- ROM


Right Upper Extremity AROM: WFL's


Left Upper Extremity AROM: Moderate limitation (L shld flex/abd limited due to 

previous CVA)


Right Lower Extremity AROM: WFL's


Left Lower Extremity AROM: WFL's





Muscle Strength





- Muscle Strength


Right Upper Extremity Strength: Mild Weakness (shld flex 4-/5, elbow flex/ext 4/

5)


Left Upper Extremity Strength: Severe Weakness (shld flex 3-/5, elbow flex/ext 4

-/5)


Right Lower Extremity Strength: Mild Weakness (hip flex 4-/5, knee flex/ext 4/5

, ankle DF/PF 4/5)


Left Lower Extremity Strength: Mild Weakness (hip flex 3+/5, knee flex/ext 4-/5

, ankle DF/PF 4-/5)





Sensation





- Sensation


Right Upper Extremity Sensation: Intact/Normal


Left Upper Extremity Sensation: Intact/Normal


Right Lower Extremity Sensation: Intact/Normal


Left Lower Extremity Sensation: Intact/Normal





Palpation


Palpation Findings: None/Normal





Balance





- Sitting Balance and Reactions


Static Sitting Balance: Fair


Dynamic Sitting Balance: Poor


Sitting Equilibrium Reactions: Delayed Left, Delayed Right


Sitting Protective Reactions: Delayed Left, Delayed Right





- Standing Balance and Reactions


Static Standing Balance: Poor


Dynamic Standing Balance: Poor


Standing Equilibrium Reactions: Delayed Left, Delayed Right


Standing Protective Reactions: Delayed Left, Delayed Right





- Comments


Balance Assessment Comments: pt leans to R occasionally loses balance requires 

min assist to maintain balance.





Functional Mobility





- Bed Mobility


Rolling R/L: Mod Assist


Supine to Sit: Mod Assist





- Transfers


Sit to Stand: Min Assist, 2 person assist


Stand to Sit: Min Assist





- Safety Awareness


Safety Awareness: Fair


BARTHEL INDEX SCORE: n/a





Ambulation





- Ambulation


Assistive Device Used: Rolling Walker


Orthotic/Prosthetic Device: No


Distance: 110ft


Assistance needed with Ambulation: Min Assist, 2 person assist


Gait Deviations: Narrow Based gait, Ataxic gait, Forward posture, Deviates from 

path


Ambulation Comments: pt amb leans to R and veers to R.


Factors Affecting Ambulation: Decreased Balance, Weakness, Decreased ROM, 

Decreased Safety, Limited Endurance





Treatment time





- Time with patient


Total treatment time: 27





Patient Education





- Education


Patient Education: Activity Modification, Education of Plan of Care


Teaching Recipient: Patient


Teaching Methods: Discussion (discussion with patient regarding POC as well as 

case management regarding PT POC)





Assessment





- Assessment


Problem List:: Decreased level of function, Requires training/education, 

Decreased safety/Risk of falls, Weakness, Pain limits previous level of function


Rehab Potential: Good


Further Therapy Indicated?: Yes


Evaluation Complexity: HISTORY: High (fall, DM, HTN, CVA), EXAM OF BODY SYSTEMS

: Medium (strength, posture, balance gait), CLINICAL PRESENTATION: Medium (

evolving), CLINICAL DECISION MAKING: Medium





Short Term Goals


GOAL #1: pt demonstrate rolling and scooting up in bed independently


Goal to be met by: 05/11/18


GOAL #2: pt transfer sup to/from sit to/from stand CGA


Goal to be met by: 05/11/18


GOAL #3: pt amb 150ft with rwx with no LOB with CGA x 1


Goal to be met by: 05/11/18


GOAL #4: pt with improved strength LLE 4-/5 to 4/5, RLE 4 to 4+/5


Goal to be met by: 05/11/18





Long Term Goals


GOAL #1: pt transfer sup to/from sit to/from stand SBA to independently


Goal to be met by: 05/14/18


GOAL #2: pt amb 200ft with rwx with SBA x 1 with no LOB


Goal to be met by: 05/14/18


GOAL #3: pt with improved dyn stand balance as noted by no LOB with gait


Goal to be met by: 05/13/18





Plan


Plan of Care: Therapeutic EX, Therapeutic Activity


Other:: gait training


Frequency of Treatment: 1-2 X day, as tolerated


Duration of Treatment: 6 days


Anticipated Discharge Destination: Home


Treatment Diagnosis  (ICD 10 Codes): R26.0 ataxic gait.  R26.81 balance 

impaired.


Has the Physician been added for Co-signature?: Yes

## 2018-05-09 NOTE — RS.OTINEVL
Subjective





- Patient information


Date of Evaluation: 05/09/18


Date of Arrival on Unit: 05/07/18


Admitted From:: Emergency Dept


Usual Living Arrangement: Personal Care Facility


Living Arrangement Comments: Pt lives at [facility]


Home Environment: Apartment


Medical History: Hypertension, CVA/TIA


Medical History Comments:: Atrial Fib, Thyroidectomy, endocrine, hysterectomy, 

CVA 2015, Cardiac disorders, Abdominal surgery, depression, OA, Tachycardia


LATEX ALLERGY?: No


Surgical History Comments:: gall bladder removed, abd. surgery.


Subjective Information/ Patient Comments:: "Hold my cup for me. I will spill it 

everywhere."





- Level of function


Prior to this admission, the patient could do the following:: Independent 

Selfcare, Independent ADL's, Independent Ambulation


Abilities prior to this admission: 





Pt living at [facility] and was able to go to the restroom herself. She has paid CG to 

help her with a bath. Pt requires assist for self feeding if she does not have 

finger foods.


Current Equipment Used at Home: rollator rwx





Pain Assessment





- Pain


Pain Score: 0





Interventions





- Objective


Patient Orientation: Person, Place, Situation


Current Interventions: IV's, Oxygen, Telemetry


Observation: Pt's head leans to the right. Pt walks very slow with a rollator 

walker.





Interventions





- ROM


Right Upper Extremity AROM: WFL's


Left Upper Extremity AROM: Moderate limitation





- Strength


Right Upper Extremity Strength: Normal


Left Upper Extremity Strength: Severe Weakness





- Sensation


Right Upper Extremity Sensation: Intact/Normal


Left Upper Extremity Sensation: Impaired





Balance





- Sitting Balance


Dynamic Sitting Balance: Fair





- Standing Balance


Static Standing Balance: Poor


Dynamic Standing Balance: Poor





ADL Skills





- Self Feeding


Self Feeding: Min Assist





- Grooming


Grooming: Min Assist





- Bathing


Bathing UE: CGA


Bathing LE: Mod Assist





- Dressing


Dressing UE: Min Assist


Dressing LE: Min Assist





- Toilet Management


Toileting Management: CGA





Functional Mobility





- Bed Mobility


Rolling R/L: Independent


Scooting: CGA


Supine to Sit: CGA


Sit to Supine: CGA





- Transfers


Sit to Stand: CGA


Stand to Sit: CGA


Stand Pivot Transfers: CGA





- Ambulation


Weight Bearing Status: FWB


Assistive Device Used: Rollator


Orthotic/Prosthetic Device: No


Assistance needed with Ambulation: CGA





- Safety Awareness


Safety Awareness: Fair


BARTHEL INDEX SCORE: .





Additional Treatment Performed





- Additional units charged


ADL: 1


OT 1 to 1 Activity: 1





- Time with patient


Total treatment time: 51





Activities


Patient Interests:: Watching Television, Listening to Music





Patient Education


Patient Education: Home Safety, Education of Plan of Care


Teaching Recipient: Patient


Teaching Methods: Discussion





Assessment


Rehab Potential: Good


Further Therapy Indicated?: Yes


Evaluation Complexity: HISTORY: Medium, EXAM OF BODY SYSTEMS: Medium, CLINICAL 

DECISION MAKING: Medium





Short Term Goals





- Goals


GOAL 1: Pt to increase her (I) of drinking from a cup with a lid


Goal to be met by: 05/16/18


GOAL 2: Pt to increase LUE reaching strength to 4-/5


Goal to be met by: 05/16/18


GOAL 3: Pt to increase dyn. std. balance to Fair+


Goal to be met by: 05/16/18





Long Term Goals


GOAL 1: Pt to increase (I) of self care to CGA.


Goal to be met by: 05/18/18


GOAL 2: Pt to increase strength of BUE to 4/5.


Goal to be met by: 05/18/18


GOAL 3: Pt to increase dyn. std. balance to Fair+


Goal to be met by: 05/18/18





Plan


Plan of Care: Therapeutic EX, Neuromuscular Re-Educ, Therapeutic Activity, Self-

Care/Home Management


Frequency of Treatment: 1-2 X day, as tolerated


Duration of Treatment: 2 Weeks


Anticipated Discharge Destination: Assisted Living Facility


Treatment Diagnosis  (ICD 10 Codes): M62.81- muscle weakness, Z74.1 Need for 

assistance for personal care.


Has the Physician been added for Co-signature?: Yes

## 2018-05-09 NOTE — PN
DATE OF SERVICE:  05/08/18



CHIEF COMPLAINT:

She fell, hit her head and was found to have an arrhythmia. 



BRIEF HISTORY OF PRESENT ILLNESS: 

She has poor gait with multiple medical problems. She has had a previous stroke 
with left hemiplegia combined with some degree of degenerative joint disease 
making her a fall risk.  Recently seen in the office. She was actually sent to 
therapy and was doing better. She got up the morning of admission and went to 
the kitchen; she had no loss of consciousness but fell striking the back of her 
head with a laceration that was induced. She was brought to the ER where she 
required stapling of the laceration. CT of the head showed nothing acute as 
well as her neck. She was found to be in a rapid ventricular response to 
probably atrial fibrillation. She has a history of atrial fibrillation and her 
Dig level was found to be low. She was given a bolus of Dig, placed on Cardizem 
drip and the bolus of Dig was repeated last night. Her rate has slowly 
improved. The Cardizem has been weaned. Her Dig level this morning was 2. She 
has actually had therapy and did some walking today. She has a minor posterior 
headache.  



PHYSICAL EXAMINATION: 

V/S: Temperature 97.8, pulse 57, respirations 20, /71. 

GENERAL: No obvious distress.

HEENT: Pupils equal. Extraocular movements intact.  

INTEGUMENT: Intact posterior occipital laceration with staples; no significant 
fluctuance.

NECK: Nontender and supple.  

CHEST: Clear.

CARDIOVASCULAR: Grade I/VI systolic murmur. Regular and precious. No peripheral 
edema. 

GI:  Obese, nontender without organomegaly. 

MUSCULOSKELETAL: Right upper extremity tremors, resting and intention. Left 
weakness, diffuse. 



LABS/X-RAYS:

White count 10.77, hemoglobin 15.5, platelets 231. Chemistry is completely 
unremarkable. Note blood sugar at 115 and potassium 4.4. 



REASON FOR ADMISSION:

Head contusion

Scalp laceration

Tachycardia

Rapid atrial fibrillation

Gait decline, acute and fall acute



Chronic illnesses of significance:

Chronic gait issues

Chronic atrial fibrillation

History of CVA with late effects

Dig therapy - subtherapeutic



PLAN:

1.  Another night of observation of the situations and rechecking of her labs 
in the morning. More therapy tomorrow; trying to decide if she is well enough 
to consider going home with assisted living or whether she will need placement 
in the nursing home. 

2.  Will talk to family tomorrow; as they are not here tonight.

3.  Laboratories as noted.

4.  No further imaging at this point. 

5.  She remains a DNR. 

MARI

## 2018-05-11 NOTE — PROGRESS NOTES
"[facility]   DISCHARGE SUMMARY    Patient ID: Daphne Santos  MRN: [id number]                                   Acct:  [id number]  Admit Date: 5/7/2018  Discharge Date: 5/10/2108  Date of service: 5/10/2018    Consults:    None     PATIENT PROFILE: The patient is a 76 y/o white single female resident of [facility]; she was cooperative.      CHIEF COMPLAINT: \"fall, head contusion, SVT\"     HISTORY OF PRESENT ILLNESS: She has poor gait with multiple medical problems.  She after a recent fall was sent to PT and was walking better.  She got up the morning of admit and was going to the kitchen; she fell striking the back of her head without LOC.  She went to [facility] ED and had staples to her occipital laceration.  She had unremarkable imaging of C spine and CT head.  She was found to have a tachycardia with chronic a fib and low dig level.  She was started on Cardizem drip and given bolus of lanoxin.  She was admitted to control heart rhythm, adjust Rx, and have neuro observation.  I am concerned she may need admit to rehab after this.  PT will be involved to help decide this.            Allergies   Allergen Reactions   • Contrast Dye Hives   • Nexium [Esomeprazole] Other (See Comments)       Doesn't remember         HOME MEDICATIONS:          Prior to Admission medications    Medication Sig Start Date End Date Taking? Authorizing Provider   acetaminophen (TYLENOL) 325 MG tablet Take 650 mg by mouth every night.       Historical Provider, MD   aspirin 81 MG EC tablet Take 81 mg by mouth Daily. Last dose 9/19       Historical Provider, MD   atorvastatin (LIPITOR) 40 MG tablet TAKE ONE TABLET NIGHTLY 1/3/18     Tre Sevilla MD   calcium carbonate-vitamin d (CALCIUM 600+D) 600-400 MG-UNIT per tablet Take 1 tablet by mouth 2 (two) times a day.       Historical Provider, MD   digoxin (LANOXIN) 125 MCG tablet TAKE 1/2 TABLET DAILY 2/27/18     Tre Sevilla MD   fluticasone (FLONASE) 50 MCG/ACT nasal " spray USE 2 SPRAYS IN EACH NOSTRIL DAILY  Patient taking differently: USE 2 SPRAYS IN EACH NOSTRIL DAILY as needed 6/1/17     Tre Sevilla MD   levETIRAcetam (KEPPRA) 1000 MG tablet TAKE ONE TABLET TWICE DAILY 11/10/17     CHRISTIANE Gaxiola   lisinopril (PRINIVIL,ZESTRIL) 2.5 MG tablet TAKE ONE TABLET DAILY 3/16/18     Tre Sevilla MD   Magnesium 200 MG tablet Take 1 tablet by mouth Daily.       Historical Provider, MD   metoprolol succinate XL (TOPROL-XL) 25 MG 24 hr tablet Take 0.5 tablets by mouth Daily. 1/10/18     Alfonso Treviño MD   Multiple Vitamins-Minerals (EYE VITAMINS PO) Take 1 tablet by mouth daily.       Historical Provider, MD   Multiple Vitamins-Minerals (MULTIVITAMIN ADULT PO) Take 1 tablet by mouth daily.       Historical Provider, MD   omeprazole (priLOSEC) 20 MG capsule Take 1 capsule by mouth 2 (Two) Times a Day. 8/9/17     CHRISTIANE Ortiz   polyethylene glycol (MIRALAX) packet Take 17 g by mouth daily.       Historical Provider, MD   potassium chloride (K-DUR,KLOR-CON) 20 MEQ CR tablet TAKE ONE TABLET TWICE DAILY 3/16/18     Tre Sevilla MD   primidone (MYSOLINE) 50 MG tablet TAKE TWO TABLETS THREE TIMES DAILY 3/21/18     CHRISTIANE Gaxiola   propafenone (RYTHMOL) 150 MG tablet TAKE ONE TABLET THREE TIMES DAILY 3/16/18     Tre Sevilla MD         PAST HISTORY:  CHILDHOOD: unremarkable.      PROCEDURES:   SCANNED  G none     EGD+bx/[facility]//7-23-08  Colonoscopy+polyp-biop/hem/[facility]///  7-23-08/1-09/4-2012  Colonoscopy+polyp-hem/[facility]//4-1-09/3y  Cath-38% ANTOINETTE/Kamila/[facility]/3.1.12  Colonoscopy+polyp-div/[facility]//4.13.12/5y  T drainage/[facility]/12.7.15      SURGERIES:  DEMI+BSO/[place]/[facility]/6-  R Partial thyroid/[place]/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 neurogenic syncope     2.27.12/2.28.12 - Echo-LVH 65%, diastolic dysfunction     HOSPITAL NOTES: [facility]  4.10.15-4.20.15  CVA R-ischemic   L hemiplegia  slurred speech  dysphagia  gait decline-acute  fall-without injury  hypertension  bradycardia-toprol excerbated  atrial fib-paroxysmal   nonsustained ventricular tachycardia  carotid bruit R (neg scan 4.6.15)  anticoagulation (ASA 81-past A fib)-holding till 4.20   xarelto  diastolic dysfunction  famial tremor  fever 100.9 without cause  hypopotassemia-diuretic influenced  weight loss-diet influenced  hypoalbumenia/proteinemia-diet influenced  elevated fasting glucose     11.5.01 [facility] holter sinus/ avg 73, rare PAC, PVC  2003/episode A fib     4.10.15 AP pelvis R > L hip DJD  4.10.15 CT CERVICAL SPINE WO CONTRAST-mild DJD/nothing acute  4.10.15/4.13.15 - [facility] QT  QTc 424/457  4.10.15 CT head without-nothing acute  4.13.15/4.13.15 - [facility]  QT QTc 352/461  4.11.15 MR brain-acute R MCA   4.11.15 CT HEAD WO CONTRAST- R MCA distribution evolving infarct  4.11.15 CXR new L subclavian C line  4.13.15/4.13.15 - [facility]  QT QTc 352/461     4.13.15 call recieved from Dr Lopez, \"I spoke with Dr Mobley and do not think pt is a canidated for Xarelto since she had such a large stroke, I will be leaving and going out of town " "late tonight and Dr Mobley will be following\"/le..ro/noted..ro  4.13.15 CT head R MCA CVA/4 mm R to L shift/no hemorrhage  4.16.15 swallow study..aspiration thin barium/no cough reflux..  4.16.15  mod/severe oral, mod pharyngeal dysphagia with penetration...pureed/nectar thick, meds crushed/applesause, ice chips between, speech     5.27.15 OFFICE - NEUROLOGY SPEC OFF NOTE-Brown-no change     HOSPITAL NOTES: [facility]   9.29.15-10.2.15  seizure-post CVA 4.2015  contusion-LUE  pain-L shoulder-contusion/sprain  pain-L elbow-contusion/sprain  CVA A-jhbyhscm-1.10.15   L hemiplegia  slurred speech  dysphagia  gait decline-acute  gait decline-chronic  hypertension  bradycardia-toprol excerbated  atrial fib-paroxysmal   nonsustained ventricular tachycardia  carotid bruit R (neg scan 4.6.15)  anticoagulation (ASA 81-past A fib)-now ASA+    xarelto/A fib+CVA  diastolic dysfunction  famial tremor     9.29.15 Neurodiagnostic mqsifu-AWT-jfgmwtkhitv slowing/encephalopathy vs posictal..intermittent rhythmic/semirhytmic triphasiform activity..     HOSPITAL NOTES: [facility]  12.3.15-12.12.15  hemothorax L  rib fractures L 7, 8, 9th  chest wall pain  fall  gait decline-acute  gait decline-chronic  constipation-acute  constipation-chronic  constipation-acute-narcotic influenced  seizure-post CVA 4.2015  CVA F-vqtnfyxn-8.10.15   L hemiplegia  slurred speech  dysphagia  hypertension  bradycardia-toprol excerbated  atrial fib-paroxysmal   nonsustained ventricular tachycardia  carotid bruit R (neg scan 4.6.15)  anticoagulation (ASA 81-past A fib)-now ASA+      xarelto/A fib+CVA-back to ASA alone  diastolic dysfunction  famial tremor     12.03.15/12.04/15  [facility]   ER outside lab UA 10-20 WBC 5-10 epi  12.03.15/12.04/15  [facility] CT scan of the chest w/o contrast L 7, 8, 9th rib/mod L hemothorax  12.03.15/12.04/15  [facility]   ER culuture neg     12.5.15 CT CHEST WO CONTRAST-L 7,8,9th rib fx/hemothorax  12.8.15 XR CHEST 1 VIEW PORTABLE-L chest tube/no " pneumothorax  12.7.15 T drainage/Brown/[facility]/12.7.15   12.3.15/12.7.15 [facility] ER Urine Culture-neg  12.6.15/12.7.15 [facility] ER QT/QTc 364/464 EKG   12.8.15/12.10.15 [facility] QT/QTc 288/467 EKG..A fib  12.9.15 XR CHEST 1 VIEW PORTABLE-L chest tube out..sl increase L effusion/though small     HOSPITAL NOTES: [facility]  5.4.16-5.10.16  seizure-LUE tremor  tremor-LLE  familial tremor-UE/head  gait decline-acute (could not use walker with     LUE tremor)  gait decline-chronic  constipation-chronic  seizure-post CVA 4.2015  CVA O-kgbwztpa-1.10.15   L hemiplegia  slurred speech  dysphagia  hypertension  bradycardia-toprol excerbated  atrial fib-paroxysmal   nonsustained ventricular tachycardia  carotid bruit R (neg scan 4.6.15)  anticoagulation (ASA 81-past A fib)-now ASA+        xarelto/A fib+CVA-back to ASA alone  congestive heart failure-chronic-diastolic  microcytosis-followed     5.5.16 MR BRAIN WO CONTRAST-old R MCA CVA..mild atroph/nothing new  5.8.16 Neurodiagnostic ktosjw-GAO-bmorbirt -? structural lesion/CVA old..vs cortical irritibility     HOSPITAL NOTES: [facility]  6.8.16-6.9.16  L humeral fracture  contusion-L periorbital  hematoma-L periorbital  fall  seizure-post CVA 4.2015  CVA F-iiepbckv-4.10.15   L hemiplegia  slurred speech  dysphagia  seizure-LUE tremor  tremor-LLE  familial tremor-UE/head  gait decline-acute (could not use walker with     LUE tremor)  gait decline-chronic  constipation-chronic  dysphagia  hypertension  bradycardia-toprol excerbated  atrial fib-paroxysmal   nonsustained ventricular tachycardia  carotid bruit R (neg scan 4.6.15)  anticoagulation (ASA 81-past A fib)-now ASA+         xarelto/A fib+CVA-back to ASA alone  congestive heart failure-chronic-diastolic  microcytosis-followed     [facility]:   None     [facility]:   None     GENERAL:  Inactive/slower with limits, speed, stamina for age and gait. Sleep is ok. No fever.  ENDO:  No syncope, near or diaphoretic sweaty spells.  HEENT: Head contusion and mild  "occipital headache,  No vision change,  Same hearing loss.  Ears without pain/drainage.  No sore throat.  No nasal/sinus congestion/drainage. No epistaxis.  CHEST: No chest wall tenderness or mass. No cough, wheeze, SOB; no hemoptysis.  CV: No chest pain,  ankle edema: occ palpitations.  GI: No heartburn, dysphagia.  No abdominal pain, diarrhea, constipation, rectal bleeding, or melena.  :  Voids without dysuria, or incontinence to completion.  ORTHO: No painful/swollen joints but various on /off sore.  No sore neck or back.  No acute neck or back pain without recent injury.   NEURO: No dizziness, weakness of extremities.  Same numbness/paresthesias.   PSYCH: No memory loss.  Mood good; mod anxious, depressed but/and not suicidal.       PHYSICAL EXAMINATION:  Wt 157  Ht 5'2\" T 98.4 P 104 RR 18 /82     GENERAL:  Well nourished/developed in no acute distress.   SKIN: Turgor excellent, without wound, rash, lesion beyond staples occipital scalp.  HEENT: Normal cephalic without trauma otherwise.  Pupils equal round reactive to light. Extraocular motions full without nystagmus. Oral cavity without growths, exudates, and moist.  Posterior pharnyx without mass, obstruction, reddness.  No thyroidmegaly, mass, tenderness, lymphadenopathy and supple.  CV: Regular rhythm.  No murmur, gallop,  edema. Posterior pulses intact.  No carotid bruits.  CHEST: No chest wall tenderness or mass.   LUNGS: Symmetric motion with clear to auscultation.    ABD: Soft, nontender without mass.   ORTHO: Symmetric extremities without swelling/point tenderness.  Full gross range of motion.    NEURO: CN 2-12 grossly intact.  Symmetric facies.  UE/LE   2-3/5 strength throughout.  L hemiplegia/focal use extremities. Speech slightly dysarthric.    PSYCH: Oriented x 3.  Tearful on/off but able to be consoled.   Purposeful/directed conservation with intact short/long gross memory.      ASSESSMENT/PROBLEM LIST:   78 y/o white " female  Allergy/intolerance: see above  Procedural history: see above  Family history: see above  G0  Menopausal  obesity  Hypertension  Hyperlipidemia  Congestive heart failure-diastolic  Cardiac arrhythmia-a fib, bradyarrhythmia  Anticoagulated DM2, CVA, carotid/ASA 81 (coumadin-fall risk)  R carotid bruit  Elevated fasting glucose  Fatty liver  GE reflux  Colon polyps  Hemorrhoids  Chronic constipation  History CVA 4/2015 (R ischemic) (speech, gait, L hemiparesis)  Late effects CVA  Speech deficits  L hemiplegia  Gait decline  Frequent falls  Seizure disorder (post CVA 9.29.15)-Keppra treated  keppra treatment  Familial tremor  Degenerative joint disease  Hypothyroidism (partial thyroidectomy)  Anxiety-chronic  Depression-chronic  Vitamin D deficiency  Macular degeneration  Poor vision     REASON FOR ADMISSION:    Head contusion  Scalp laceration  Tachycardia-rapid a fib  Gait decline-acute  Fall-acute    HOSPITAL COARSE:  Initial cardizem drip needed/and then weaned.  Dig was brought back to theraputic.  She started walking with PT; and improved to a safer level.  Through this the patient/family decided she was not well enough/stable enough to go home directly and chose to go to [facility] for skill care/PT.      Labs included: UA with 2+ protein and nothing else.  Troponin 1.245 not felt to be significant.  Dig from 0.61 to 2.0.  Free T4, TSH normal.  WBC 8.9 to 7.8 with Hb 16 and fluids to 14 (without signs of bleeding).     DISCHARGE ASSESSMENT:  Reasons for admit/problems address while here:   Head contusion  Scalp laceration  Tachycardia-rapid a fib  Gait decline-acute  Fall-acute  Low dig level    Chronic problems affecting stay:   76 y/o white female  Allergy/intolerance: see above  Procedural history: see above  Family history: see above  G0  Menopausal  obesity  Hypertension  Hyperlipidemia  Congestive heart failure-diastolic  Cardiac arrhythmia-a fib, bradyarrhythmia  Anticoagulated DM2, CVA, carotid/ASA 81  (coumadin-fall risk)  R carotid bruit  Elevated fasting glucose  Fatty liver  GE reflux  Colon polyps  Hemorrhoids  Chronic constipation  History CVA 4/2015 (R ischemic) (speech, gait, L hemiparesis)  Late effects CVA  Speech deficits  L hemiplegia  Gait decline  Frequent falls  Seizure disorder (post CVA 9.29.15)-Keppra treated  keppra treatment  Familial tremor  Degenerative joint disease  Hypothyroidism (partial thyroidectomy)  Anxiety-chronic  Depression-chronic  Vitamin D deficiency  Macular degeneration  Poor vision    PLAN:   See AVS    Discharge Disposition:  to [facility]    Discharge Medications:   Daphne Santos   Home Medication Instructions SHANNAN:    Printed on:05/10/18 4413   Medication Information                      acetaminophen (TYLENOL) 325 MG tablet  Take 650 mg by mouth every night.             aspirin 81 MG EC tablet  Take 81 mg by mouth Daily. Last dose 9/19             atorvastatin (LIPITOR) 40 MG tablet  TAKE ONE TABLET NIGHTLY             calcium carbonate-vitamin d (CALCIUM 600+D) 600-400 MG-UNIT per tablet  Take 1 tablet by mouth 2 (two) times a day.             digoxin (LANOXIN) 0.625 mg each day             fluticasone (FLONASE) 50 MCG/ACT nasal spray  USE 2 SPRAYS IN EACH NOSTRIL DAILY             levETIRAcetam (KEPPRA) 1000 MG tablet  TAKE ONE TABLET TWICE DAILY             lisinopril (PRINIVIL,ZESTRIL) 2.5 MG tablet  TAKE ONE TABLET DAILY             Magnesium 200 MG tablet  Take 1 tablet by mouth Daily.             metoprolol succinate XL (TOPROL-XL) 25 MG 24 hr tablet  Take 0.5 tablets by mouth Daily.             Multiple Vitamins-Minerals (EYE VITAMINS PO)  Take 1 tablet by mouth daily.             Multiple Vitamins-Minerals (MULTIVITAMIN ADULT PO)  Take 1 tablet by mouth daily.             omeprazole (priLOSEC) 20 MG capsule  Take 1 capsule by mouth 2 (Two) Times a Day.             polyethylene glycol (MIRALAX) packet  Take 17 g by mouth daily.             potassium chloride  (K-DUR,KLOR-CON) 20 MEQ CR tablet  TAKE ONE TABLET TWICE DAILY             primidone (MYSOLINE) 50 MG tablet  TAKE TWO TABLETS THREE TIMES DAILY             propafenone (RYTHMOL) 150 MG tablet  TAKE ONE TABLET THREE TIMES DAILY               Discharge Care Plan/Instructions:   Admit to [facility]; routine NH admit orders  Up with assistance until cleared by PT  PT, OT referrals  LAB H&H, BMP, Lanoxin 5-7 days  Diet: ADA, AHA    Follow-up Appointments:   Dr Sevilla will see on regular rounds    Future Appointments  Date Time Provider Department Center   7/10/2018 9:30 AM Alfonso Treviño MD [facility] CD PAD None   8/21/2018 9:40 AM CHRISTIANE Gaxiola [facility] N PAD None   10/26/2018 9:45 AM Tre Sevilla MD [facility] PC METR None       CONDITION: stable/improved    PROGNOSIS: guarded.

## 2018-05-16 NOTE — PN
DATE OF SERVICE: 05/09/18



CHIEF COMPLAINT:

"I have fallen and hit my head."



BRIEF HISTORY OF PRESENT ILLNESS: 

She has poor baseline gait with multiple medical problems. She had a previous 
stroke with left hemiplegia combined with some degree of degenerative joint 
disease making her an extreme fall risk. She was recently seen in the office. 
She was actually sent to therapy because she had recently fallen. She was doing 
better. The morning of admission she got up to go to the kitchen and rather 
unpredictably fell. She does not think that there is any loss of consciousness 
and with a history of seizures, she doesn't think there was any seizure.  She 
didn't think she was lethargic afterwards.  The people at [facility] came to 
her assistance. She was brought to the ER where she had posterior occipital 
laceration that was stapled.  CT of her head showed nothing acute as well as 
her neck. She was found to be in rapid ventricular response with atrial fib; 
something she has had before. Her Dig level was low and she was given a bolus 
of Dig and placed on Cardizem drip. For the first 24 hours we maintained the 
drip, gave additional couple doses of IV Dig and her heart rate improved. She 
was able to be weaned off the Cardizem.  Her blood pressure runs on the lower 
side, unfortunately tolerated without orthostasis. She has continued to have a 
minor posterior headache. Therapy started in earnest today. She is walking some 
but is perceived by the therapy department as being somewhat of a continued 
fall risk.  She wants to go back to [facility]. Her family thinks she needs 
to go through rehab.  



PHYSICAL EXAMINATION: 

V/S: Temperature 97.8, pulse 60, respirations 20, /61. 

GENERAL: Pleasant, no obvious distress. 

INTEGUMENT: Turgor was adequate to slightly pale. Mucous membranes are moist. 
No ankle edema. 

HEENT: The laceration is intact. 

NECK: Nontender.

CHEST: Clear. 

CARDIOVASCULAR: Casey with Grade I/VI systolic murmur. No peripheral edema. 

GI: No organomegaly, mass or tenderness.

MUSCULOSKELETAL: Left facial weakness, left hemiplegia.  diminished one on 
the left upper extremity; same on the lower. Compares to two on the right. Has 
intention tremor of the right upper extremity. 



LABS/X-RAYS:

White count 7.89, hemoglobin 14, platelets 184; all of these stable. 
Chemistries show no significant problems. 



ASSESSMENT:

1.  HEAD CONTUSION

2.  SCALP LACERATION

3.  TACHYCARDIA

4.  RAPID ATRIAL FIB

5.  GAIT DECLINE, ACUTE ON CHRONIC

6.  ACUTE FALL



CHRONIC ILLNESS OF SIGNIFICANCE:

1.  CHRONIC GAIT ISSUES

2.  CHRONIC ATRIAL FIBRILLATION

3.  HISTORY OF CVA WITH MULTIPLE LATE EFFECTS AND SUBTHERAPEUTIC DIG THERAPY - 
IMPROVED



PLAN:

1.  Will see how therapy feels about tomorrow and based on that how the patient 
and family feel about rehab versus going back to [facility]. 

JACKIED

## 2018-06-04 PROBLEM — I63.9 CEREBROVASCULAR ACCIDENT (CVA) (HCC): Status: ACTIVE | Noted: 2018-01-01

## 2018-06-05 NOTE — TELEPHONE ENCOUNTER
Says [facility] will not go to [facility]. They will not take the referral. [phone]

## (undated) DEVICE — ENDOPATH XCEL WITH OPTIVIEW TECHNOLOGY UNIVERSAL TROCAR STABILITY SLEEVES: Brand: ENDOPATH XCEL OPTIVIEW

## (undated) DEVICE — ENDOGATOR AUXILIARY WATER JET CONNECTOR: Brand: ENDOGATOR

## (undated) DEVICE — PDS II VLT 0 107CM AG ST3: Brand: ENDOLOOP

## (undated) DEVICE — ELECTRD L HK EZ CLN 33CM

## (undated) DEVICE — ENDOPATH PNEUMONEEDLE INSUFFLATION NEEDLES WITH LUER LOCK CONNECTORS 120MM: Brand: ENDOPATH

## (undated) DEVICE — TRY PREP SCRB VAG PVP

## (undated) DEVICE — CONMED SCOPE SAVER BITE BLOCK, 20X27 MM: Brand: SCOPE SAVER

## (undated) DEVICE — TBG SMPL FLTR LINE NASL 02/C02 A/ BX/100

## (undated) DEVICE — SENSR O2 OXIMAX FNGR A/ 18IN NONSTR

## (undated) DEVICE — ANTIBACTERIAL UNDYED BRAIDED (POLYGLACTIN 910), SYNTHETIC ABSORBABLE SUTURE: Brand: COATED VICRYL

## (undated) DEVICE — PAD LAP CHOLE: Brand: MEDLINE INDUSTRIES, INC.

## (undated) DEVICE — SUT VIC 0 SUTUPAK TIES 18IN J906G

## (undated) DEVICE — PK TURNOVER RM ADV

## (undated) DEVICE — CUFF,BP,DISP,1 TUBE,ADULT,HP: Brand: MEDLINE

## (undated) DEVICE — GLV SURG BIOGEL LTX PF 6 1/2

## (undated) DEVICE — MINI ENDOCUT SCISSOR TIP, DISPOSABLE: Brand: RENEW

## (undated) DEVICE — 3M™ STERI-STRIP™ REINFORCED ADHESIVE SKIN CLOSURES, R1547, 1/2 IN X 4 IN (12 MM X 100 MM), 6 STRIPS/ENVELOPE: Brand: 3M™ STERI-STRIP™

## (undated) DEVICE — ENDOPOUCH RETRIEVER SPECIMEN RETRIEVAL BAGS: Brand: ENDOPOUCH RETRIEVER

## (undated) DEVICE — Device: Brand: DEFENDO AIR/WATER/SUCTION AND BIOPSY VALVE

## (undated) DEVICE — 2, DISPOSABLE SUCTION/IRRIGATOR WITHOUT DISPOSABLE TIP: Brand: STRYKEFLOW

## (undated) DEVICE — CLIP APPLIER: Brand: ENDO CLIP

## (undated) DEVICE — ENDOPATH XCEL DILATING TIP TROCARS WITH STABILITY SLEEVES: Brand: ENDOPATH XCEL

## (undated) DEVICE — ENDOPATH XCEL WITH OPTIVIEW TECHNOLOGY DILATING TIP TROCARS WITH STABILITY SLEEVES: Brand: ENDOPATH XCEL OPTIVIEW

## (undated) DEVICE — THE CHANNEL CLEANING BRUSH IS A NYLON FLEXI BRUSH ATTACHED TO A FLEXIBLE PLASTIC SHEATH DESIGNED TO SAFELY REMOVE DEBRIS FROM FLEXIBLE ENDOSCOPES.

## (undated) DEVICE — ELECTRD BLD EDGE/INSUL1P 2.4X5.1MM STRL